# Patient Record
Sex: FEMALE | Race: BLACK OR AFRICAN AMERICAN | NOT HISPANIC OR LATINO | Employment: FULL TIME | URBAN - METROPOLITAN AREA
[De-identification: names, ages, dates, MRNs, and addresses within clinical notes are randomized per-mention and may not be internally consistent; named-entity substitution may affect disease eponyms.]

---

## 2017-06-13 ENCOUNTER — OFFICE VISIT (OUTPATIENT)
Dept: OBSTETRICS AND GYNECOLOGY | Facility: CLINIC | Age: 47
End: 2017-06-13
Payer: COMMERCIAL

## 2017-06-13 VITALS
HEIGHT: 66 IN | DIASTOLIC BLOOD PRESSURE: 72 MMHG | SYSTOLIC BLOOD PRESSURE: 126 MMHG | BODY MASS INDEX: 26.58 KG/M2 | WEIGHT: 165.38 LBS

## 2017-06-13 DIAGNOSIS — Z01.419 VISIT FOR GYNECOLOGIC EXAMINATION: Primary | ICD-10-CM

## 2017-06-13 DIAGNOSIS — Z01.419 GYNECOLOGIC EXAM NORMAL: ICD-10-CM

## 2017-06-13 PROCEDURE — 99386 PREV VISIT NEW AGE 40-64: CPT | Mod: S$GLB,,, | Performed by: OBSTETRICS & GYNECOLOGY

## 2017-06-13 PROCEDURE — 88175 CYTOPATH C/V AUTO FLUID REDO: CPT

## 2017-06-13 PROCEDURE — 99999 PR PBB SHADOW E&M-NEW PATIENT-LVL III: CPT | Mod: PBBFAC,,, | Performed by: OBSTETRICS & GYNECOLOGY

## 2017-06-13 RX ORDER — AMLODIPINE BESYLATE 5 MG/1
5 TABLET ORAL DAILY
COMMUNITY
End: 2019-09-03 | Stop reason: CLARIF

## 2017-06-13 RX ORDER — BUSPIRONE HYDROCHLORIDE 5 MG/1
5 TABLET ORAL 2 TIMES DAILY
COMMUNITY
End: 2019-09-03 | Stop reason: ALTCHOICE

## 2017-06-13 RX ORDER — PANTOPRAZOLE SODIUM 40 MG/1
40 TABLET, DELAYED RELEASE ORAL DAILY
COMMUNITY
End: 2019-09-03 | Stop reason: SDUPTHER

## 2017-06-13 NOTE — PROGRESS NOTES
Chief Complaint   Patient presents with    Well Woman       History and Physical:  Patient's last menstrual period was 2017 (approximate).       Joan Rizvi is a 46 y.o. -American Female who presents today for her routine annual GYN exam. The patient has no Gynecology complaints today. Fibroids, menses lasting 3-4 days each months, endometrial ablation 3-4 years ago. No bowel or bladder complaints. Counseled on benign nature of fibroids, treatment options.       Allergies:   Review of patient's allergies indicates:   Allergen Reactions    Hydrochlorothiazide        Past Medical History:   Diagnosis Date    GERD (gastroesophageal reflux disease)     Hypertension     Mental disorder        Past Surgical History:   Procedure Laterality Date     SECTION      ENDOMETRIAL ABLATION      upper gi         MEDS:   No current outpatient prescriptions on file prior to visit.     No current facility-administered medications on file prior to visit.        OB History      Para Term  AB Living    4 3 2 1 1     SAB TAB Ectopic Multiple Live Births                  Social History     Social History    Marital status:      Spouse name: N/A    Number of children: N/A    Years of education: N/A     Occupational History    Not on file.     Social History Main Topics    Smoking status: Former Smoker    Smokeless tobacco: Not on file    Alcohol use No    Drug use: No    Sexual activity: Yes     Partners: Male     Other Topics Concern    Not on file     Social History Narrative    No narrative on file       Family History   Problem Relation Age of Onset    Heart failure Mother     Diabetes Maternal Aunt     Diabetes Maternal Uncle     Heart disease Maternal Uncle          Past medical and surgical history reviewed.   I have reviewed the patient's medical history in detail and updated the computerized patient record.        Review of System:   General: no chills, fever,  "night sweats, weight gain or weight loss  Psychological: no depression or suicidal ideation  Breasts: no new or changing breast lumps, nipple discharge or masses.  Respiratory: no cough, shortness of breath, or wheezing  Cardiovascular: no chest pain or dyspnea on exertion  Gastrointestinal: no abdominal pain, change in bowel habits, or black or bloody stools  Genito-Urinary: no incontinence, urinary frequency/urgency or vulvar/vaginal symptoms, pelvic pain or abnormal vaginal bleeding.  Musculoskeletal: no gait disturbance or muscular weakness      Physical Exam:   /72   Ht 5' 6" (1.676 m)   Wt 75 kg (165 lb 5.5 oz)   LMP 06/03/2017 (Approximate)   BMI 26.69 kg/m²   Constitutional: She is oriented to person, place, and time. She appears well-developed and well-nourished. No distress.   HENT:   Head: Normocephalic and atraumatic.   Eyes: Conjunctivae and EOM are normal. No scleral icterus.   Neck: Normal range of motion. Neck supple. No tracheal deviation present.   Cardiovascular: Normal rate.    Pulmonary/Chest: Effort normal. No respiratory distress. She exhibits no tenderness.  Breasts: are symmetrical.   Right breast exhibits no inverted nipple, no mass, no nipple discharge, no skin change and no tenderness.   Left breast exhibits no inverted nipple, no mass, no nipple discharge, no skin change and no tenderness.  Abdominal: Soft. She exhibits no distension and no mass. There is no tenderness. There is no rebound and no guarding.   Genitourinary:    External rectal exam shows no thrombosed external hemorrhoids.    Pelvic exam was performed with patient supine.   No labial fusion.   There is no rash, lesion or injury on the right labia.   There is no rash, lesion or injury on the left labia.   No bleeding and no signs of injury around the vaginal introitus, urethra is without lesions and well supported. The cervix is visualized with no discharge, lesions or friability.   No vaginal discharge found.    " No significant Cystocele, Enterocele or rectocele, and uterus well supported.   Bimanual exam:   The urethra is normal to palpation and there are no palpable vaginal wall masses.   Uterus is not deviated, not enlarged, not fixed, normal shape and not tender.   Cervix exhibits no motion tenderness.    Right adnexum displays no mass and no tenderness.   Left adnexum displays no mass and no tenderness.  Musculoskeletal: Normal range of motion.   Lymphadenopathy: No inguinal adenopathy present.   Neurological: She is alert and oriented to person, place, and time. Coordination normal.   Skin: Skin is warm and dry. She is not diaphoretic.   Psychiatric: She has a normal mood and affect.      Assessment:   Normal annual GYN exam  1. Gynecologic exam normal  Liquid-based pap smear, screening   small fibroid uterus, minimal symptoms, counseled.     Plan:   PAP  Mammogram  Motrin 600mg q 8 with cycles , follow up in 2-3 months if not improving.   Follow up in 1 year.

## 2018-05-31 DIAGNOSIS — Z12.31 VISIT FOR SCREENING MAMMOGRAM: Primary | ICD-10-CM

## 2018-06-07 ENCOUNTER — HOSPITAL ENCOUNTER (OUTPATIENT)
Dept: RADIOLOGY | Facility: HOSPITAL | Age: 48
Discharge: HOME | End: 2018-06-07
Attending: NURSE PRACTITIONER
Payer: COMMERCIAL

## 2018-06-07 VITALS — BODY MASS INDEX: 26.52 KG/M2 | HEIGHT: 66 IN | WEIGHT: 165 LBS

## 2018-06-07 DIAGNOSIS — Z12.31 VISIT FOR SCREENING MAMMOGRAM: ICD-10-CM

## 2018-06-07 PROCEDURE — 77067 SCR MAMMO BI INCL CAD: CPT | Mod: 26,,, | Performed by: RADIOLOGY

## 2018-06-07 PROCEDURE — 77063 BREAST TOMOSYNTHESIS BI: CPT | Mod: 26,,, | Performed by: RADIOLOGY

## 2018-06-07 PROCEDURE — 77067 SCR MAMMO BI INCL CAD: CPT | Mod: TC

## 2019-05-22 ENCOUNTER — OFFICE VISIT (OUTPATIENT)
Dept: GASTROENTEROLOGY | Facility: CLINIC | Age: 49
End: 2019-05-22
Payer: COMMERCIAL

## 2019-05-22 ENCOUNTER — HOSPITAL ENCOUNTER (OUTPATIENT)
Dept: RADIOLOGY | Facility: HOSPITAL | Age: 49
Discharge: HOME | End: 2019-05-22
Attending: INTERNAL MEDICINE
Payer: COMMERCIAL

## 2019-05-22 ENCOUNTER — HOSPITAL ENCOUNTER (OUTPATIENT)
Dept: CARDIOLOGY | Facility: CLINIC | Age: 49
Discharge: HOME | End: 2019-05-22
Payer: COMMERCIAL

## 2019-05-22 ENCOUNTER — TELEPHONE (OUTPATIENT)
Dept: GASTROENTEROLOGY | Facility: CLINIC | Age: 49
End: 2019-05-22

## 2019-05-22 VITALS
DIASTOLIC BLOOD PRESSURE: 84 MMHG | HEIGHT: 63 IN | BODY MASS INDEX: 29.92 KG/M2 | SYSTOLIC BLOOD PRESSURE: 147 MMHG | HEART RATE: 81 BPM | WEIGHT: 168.88 LBS

## 2019-05-22 DIAGNOSIS — R07.9 CHEST PAIN, UNSPECIFIED TYPE: ICD-10-CM

## 2019-05-22 DIAGNOSIS — R07.9 CHEST PAIN, UNSPECIFIED TYPE: Primary | ICD-10-CM

## 2019-05-22 PROCEDURE — 99999 PR PBB SHADOW E&M-EST. PATIENT-LVL III: ICD-10-PCS | Mod: PBBFAC,,, | Performed by: INTERNAL MEDICINE

## 2019-05-22 PROCEDURE — 93005 ELECTROCARDIOGRAM TRACING: CPT | Mod: S$GLB,,, | Performed by: INTERNAL MEDICINE

## 2019-05-22 PROCEDURE — 99204 OFFICE O/P NEW MOD 45 MIN: CPT | Mod: S$GLB,,, | Performed by: INTERNAL MEDICINE

## 2019-05-22 PROCEDURE — 93005 EKG 12-LEAD: ICD-10-PCS | Mod: S$GLB,,, | Performed by: INTERNAL MEDICINE

## 2019-05-22 PROCEDURE — 71046 X-RAY EXAM CHEST 2 VIEWS: CPT | Mod: TC

## 2019-05-22 PROCEDURE — 99204 PR OFFICE/OUTPT VISIT, NEW, LEVL IV, 45-59 MIN: ICD-10-PCS | Mod: S$GLB,,, | Performed by: INTERNAL MEDICINE

## 2019-05-22 PROCEDURE — 3008F PR BODY MASS INDEX (BMI) DOCUMENTED: ICD-10-PCS | Mod: CPTII,S$GLB,, | Performed by: INTERNAL MEDICINE

## 2019-05-22 PROCEDURE — 93010 EKG 12-LEAD: ICD-10-PCS | Mod: S$GLB,,, | Performed by: INTERNAL MEDICINE

## 2019-05-22 PROCEDURE — 71046 X-RAY EXAM CHEST 2 VIEWS: CPT | Mod: 26,,, | Performed by: RADIOLOGY

## 2019-05-22 PROCEDURE — 99999 PR PBB SHADOW E&M-EST. PATIENT-LVL III: CPT | Mod: PBBFAC,,, | Performed by: INTERNAL MEDICINE

## 2019-05-22 PROCEDURE — 3008F BODY MASS INDEX DOCD: CPT | Mod: CPTII,S$GLB,, | Performed by: INTERNAL MEDICINE

## 2019-05-22 PROCEDURE — 93010 ELECTROCARDIOGRAM REPORT: CPT | Mod: S$GLB,,, | Performed by: INTERNAL MEDICINE

## 2019-05-22 PROCEDURE — 71046 XR CHEST PA AND LATERAL: ICD-10-PCS | Mod: 26,,, | Performed by: RADIOLOGY

## 2019-05-22 RX ORDER — SUCRALFATE 1 G/10ML
1 SUSPENSION ORAL 4 TIMES DAILY
Qty: 500 ML | Refills: 1 | Status: SHIPPED | OUTPATIENT
Start: 2019-05-22 | End: 2019-05-23 | Stop reason: SDUPTHER

## 2019-05-22 NOTE — PROGRESS NOTES
Reason for visit:  Chest pain and epigastric pain.    HPI:  Ms. Rizvi is a 48-year-old whose been complaining of retrosternal burning sensation with some heaviness for the past 3 days.  This has been a continuous pain and discomfort which has not relented.  She typically has heartburn issues for which she takes pantoprazole daily.  She has also been taking Zantac 150 mg as needed, however over the past 3 days it appears to have worsened.  She feels pain is more consistent with her reflux symptoms.  She does notice some mild increase in her pain with exertion and with pressing on her chest.  If she takes her bra off of she feels a little better as well.  She denies any dysphagia or odynophagia.  No recent changes in medication or any trauma to the chest.  She denies any a recent binge alcohol drinking.  She denies taking any NSAIDs on a regular basis.  She does have some mild epigastric discomfort as well.  Pain does seem to go up into her shoulders and along the right side to her back as well. Does notice some night sweats but that is been going on for about 3 weeks now.  Her menstrual cycle is stool regular.  She denies any changes in her bowel pattern.  No weight loss fevers or chills. She has been using ibuprofen of p.r.n. as well to help with the chest pain.    Past medical, surgical, social and family history reviewed in epic    Medication allergies reviewed in epic.    Review of systems:  Constitutional:  No fevers no chills no unintentional weight loss, appetite is stable, complains of some malaise and fatigue over the past 3 days  Eyes:  No visual changes or red eyes  ENT:  No odynophagia or hoarseness of voice  Cardiovascular:  No angina or palpitation, no exertional dyspnea  Respiratory:  No shortness of breath or wheezing  Genitourinary:  No dysuria frequency or hematuria  Musculoskeletal:  Complaining of some arthralgias her hands, no myalgias  Skin:  No pruritus or eczema  Neurologic:  Occasional  headaches but no seizures  Gastrointestinal:  See HPI    Physical exam:  Vitals see epic, awake alert oriented x3, appears anxious  Neck:  Supple, no carotid bruits heard  Abdomen:  Soft, mild tenderness to deep palpation in the epigastrium, no guarding or rigidity, nondistended, no masses palpable, no hepatosplenomegaly appreciated, bowel sounds are normal with no abdominal bruits heard  Eyes:  Conjunctivae anicteric, not injected  ENT:  Oral mucosa moist  Cardiovascular:  S1, S2 normal , possible flow murmur in the aortic area, no gallops.  Tenderness to palpation along bilateral lower rib cage  Respiratory:  Bilateral air entry equal with no rhonchi or crackles  Skin:  No palmar erythema or spider angiomata  Neurologic:  No asterixis or tremors  Psychiatric:  Anxious  Lower extremities:  No pedal edema    Impression:  Recent onset of chest pain and longstanding history of gastroesophageal reflux.  Less likely this is cardiac.    Recommendation:  1. Proceed with EKG today  2. Check CBC, CMP, lipase, troponin   3. Schedule ultrasound of the abdomen and upper endoscopy  4. Chest x-ray today  5. Limit ibuprofen use, may use tylenol instead.  6. Carafate suspension  four times a day. Continue Pantoprazole daily.

## 2019-05-22 NOTE — TELEPHONE ENCOUNTER
----- Message from Celestino Walker MD sent at 5/22/2019 12:17 PM CDT -----  Chest X ray looks fine.

## 2019-05-23 ENCOUNTER — TELEPHONE (OUTPATIENT)
Dept: GASTROENTEROLOGY | Facility: CLINIC | Age: 49
End: 2019-05-23

## 2019-05-23 ENCOUNTER — HOSPITAL ENCOUNTER (OUTPATIENT)
Dept: RADIOLOGY | Facility: OTHER | Age: 49
Discharge: HOME | End: 2019-05-23
Attending: INTERNAL MEDICINE
Payer: COMMERCIAL

## 2019-05-23 ENCOUNTER — TELEPHONE (OUTPATIENT)
Dept: ENDOSCOPY | Facility: HOSPITAL | Age: 49
End: 2019-05-23

## 2019-05-23 DIAGNOSIS — R10.10 UPPER ABDOMINAL PAIN: Primary | ICD-10-CM

## 2019-05-23 DIAGNOSIS — R10.10 UPPER ABDOMINAL PAIN: ICD-10-CM

## 2019-05-23 PROCEDURE — 74177 CT ABDOMEN PELVIS WITH CONTRAST: ICD-10-PCS | Mod: 26,,, | Performed by: RADIOLOGY

## 2019-05-23 PROCEDURE — 25500020 PHARM REV CODE 255: Performed by: INTERNAL MEDICINE

## 2019-05-23 PROCEDURE — 74177 CT ABD & PELVIS W/CONTRAST: CPT | Mod: TC

## 2019-05-23 PROCEDURE — 74177 CT ABD & PELVIS W/CONTRAST: CPT | Mod: 26,,, | Performed by: RADIOLOGY

## 2019-05-23 RX ORDER — SUCRALFATE 1 G/1
1 TABLET ORAL 4 TIMES DAILY
Qty: 50 TABLET | Refills: 1 | Status: SHIPPED | OUTPATIENT
Start: 2019-05-23 | End: 2019-09-03 | Stop reason: ALTCHOICE

## 2019-05-23 RX ADMIN — IOHEXOL 30 ML: 350 INJECTION, SOLUTION INTRAVENOUS at 11:05

## 2019-05-23 RX ADMIN — IOHEXOL 75 ML: 350 INJECTION, SOLUTION INTRAVENOUS at 12:05

## 2019-05-23 NOTE — TELEPHONE ENCOUNTER
----- Message from Celestino Walker MD sent at 5/23/2019  2:25 PM CDT -----  I had discussed the CT scan result with the patient earlier. After discussing with  (pancreas specialist) we have decided to do the EGD and EUS of the pancreas together at the end of June. If it is from inflammation the pancreas should look much better. Order for EGD/EUS placed.

## 2019-05-23 NOTE — PROGRESS NOTES
I had discussed the CT scan result with the patient earlier. After discussing with  (pancreas specialist) we have decided to do the EGD and EUS of the pancreas together at the end of June. If it is from inflammation the pancreas should look much better. Order for EGD/EUS placed.

## 2019-05-23 NOTE — TELEPHONE ENCOUNTER
Ma called pt no answer message left on pt vm  Ma called pt because Dr kaye wanted pt to schedule egd and eus in June on the 2nd floor

## 2019-05-23 NOTE — TELEPHONE ENCOUNTER
----- Message from Celestino Walker MD sent at 5/23/2019  2:25 PM CDT -----  Could you please schedule EGD with EUS end of June for this patient. Case discussed with .

## 2019-05-23 NOTE — TELEPHONE ENCOUNTER
----- Message from Celestino Walker MD sent at 5/23/2019  7:51 AM CDT -----  Please notify patient, the blood test appears suspicious for pancreatitis. Please schedule CT scan today ASAP.

## 2019-05-24 ENCOUNTER — TELEPHONE (OUTPATIENT)
Dept: GASTROENTEROLOGY | Facility: CLINIC | Age: 49
End: 2019-05-24

## 2019-05-24 ENCOUNTER — PATIENT MESSAGE (OUTPATIENT)
Dept: ENDOSCOPY | Facility: HOSPITAL | Age: 49
End: 2019-05-24

## 2019-05-24 NOTE — TELEPHONE ENCOUNTER
Marti,   Patient is is calling to schedule her EGD w/ EUS on the 2nd floor with [facility] please call pt back  Thanks

## 2019-05-24 NOTE — TELEPHONE ENCOUNTER
----- Message from Morena Radha sent at 5/24/2019  1:59 PM CDT -----  Contact: self [phone]  Needs Advice    Reason for call: Pt called regarding a f/u for her medication and also she is ready to schedule her procedure appt. She also states her symptoms haven't been progressing        Communication Preference: self [phone]    Additional Information:

## 2019-05-24 NOTE — TELEPHONE ENCOUNTER
Spoke with Justyn from pharmacy he will  pt on medication , on how to dissolve medication in water or applesauce

## 2019-05-24 NOTE — TELEPHONE ENCOUNTER
----- Message from Morena Garcia sent at 5/24/2019  2:06 PM CDT -----  Contact: self [phone]  Patient Returning Call from [facility]    Who Left Message for Patient: Pt states Dr. Walker Office  Communication Preference: self [phone]  Additional Information:

## 2019-05-24 NOTE — TELEPHONE ENCOUNTER
----- Message from Katia Young sent at 5/24/2019  2:24 PM CDT -----  Contact: Kevin- [facility]- [phone]  Aaron- pharmacy called to determine if the rx sucralfate (CARAFATE) 100 mg/mL suspension can be changed from the Liquid to tablet form- please contact Justyn at ext [phone]

## 2019-05-31 ENCOUNTER — TELEPHONE (OUTPATIENT)
Dept: ENDOSCOPY | Facility: HOSPITAL | Age: 49
End: 2019-05-31

## 2019-05-31 NOTE — TELEPHONE ENCOUNTER
Spoke with patient EGD/EUS scheduled for 6/24 at [facility]. Reviewed prep instructions. Ms Rizvi verbalized understanding.

## 2019-05-31 NOTE — TELEPHONE ENCOUNTER
----- Message from Kendra Chan sent at 5/31/2019  3:32 PM CDT -----  Contact: pt#[phone]  Needs Advice    Reason for call:Pt is calling to schedule procedure         Communication Preference:call    Additional Information:

## 2019-06-11 ENCOUNTER — TELEPHONE (OUTPATIENT)
Dept: ENDOSCOPY | Facility: HOSPITAL | Age: 49
End: 2019-06-11

## 2019-06-11 NOTE — TELEPHONE ENCOUNTER
Spoke to pt, she is scheduled for an EUS/EGD on 6/24/19 at 11:00 am, medical history/medications reviewed and prep instructions mailed to pt.

## 2019-06-14 ENCOUNTER — PATIENT MESSAGE (OUTPATIENT)
Dept: ADMINISTRATIVE | Facility: OTHER | Age: 49
End: 2019-06-14

## 2019-06-24 ENCOUNTER — ANESTHESIA (OUTPATIENT)
Dept: ENDOSCOPY | Facility: HOSPITAL | Age: 49
End: 2019-06-24
Payer: COMMERCIAL

## 2019-06-24 ENCOUNTER — TELEPHONE (OUTPATIENT)
Dept: ENDOSCOPY | Facility: HOSPITAL | Age: 49
End: 2019-06-24

## 2019-06-24 ENCOUNTER — HOSPITAL ENCOUNTER (OUTPATIENT)
Facility: HOSPITAL | Age: 49
Discharge: HOME | End: 2019-06-24
Attending: INTERNAL MEDICINE | Admitting: INTERNAL MEDICINE
Payer: COMMERCIAL

## 2019-06-24 ENCOUNTER — ANESTHESIA EVENT (OUTPATIENT)
Dept: ENDOSCOPY | Facility: HOSPITAL | Age: 49
End: 2019-06-24
Payer: COMMERCIAL

## 2019-06-24 VITALS
HEART RATE: 75 BPM | HEIGHT: 65 IN | RESPIRATION RATE: 17 BRPM | OXYGEN SATURATION: 98 % | DIASTOLIC BLOOD PRESSURE: 73 MMHG | BODY MASS INDEX: 26.66 KG/M2 | SYSTOLIC BLOOD PRESSURE: 139 MMHG | WEIGHT: 160 LBS | TEMPERATURE: 98 F

## 2019-06-24 DIAGNOSIS — K86.9 PANCREATIC LESION: Primary | ICD-10-CM

## 2019-06-24 LAB
B-HCG UR QL: NEGATIVE
CTP QC/QA: YES

## 2019-06-24 PROCEDURE — D9220A PRA ANESTHESIA: ICD-10-PCS | Mod: ANES,,, | Performed by: ANESTHESIOLOGY

## 2019-06-24 PROCEDURE — D9220A PRA ANESTHESIA: Mod: CRNA,,, | Performed by: NURSE ANESTHETIST, CERTIFIED REGISTERED

## 2019-06-24 PROCEDURE — 43259 PR ENDOSCOPIC ULTRASOUND EXAM: ICD-10-PCS | Mod: ,,, | Performed by: INTERNAL MEDICINE

## 2019-06-24 PROCEDURE — 25000003 PHARM REV CODE 250: Performed by: INTERNAL MEDICINE

## 2019-06-24 PROCEDURE — 43259 EGD US EXAM DUODENUM/JEJUNUM: CPT | Mod: ,,, | Performed by: INTERNAL MEDICINE

## 2019-06-24 PROCEDURE — 81025 URINE PREGNANCY TEST: CPT | Performed by: INTERNAL MEDICINE

## 2019-06-24 PROCEDURE — 63600175 PHARM REV CODE 636 W HCPCS: Performed by: NURSE ANESTHETIST, CERTIFIED REGISTERED

## 2019-06-24 PROCEDURE — D9220A PRA ANESTHESIA: Mod: ANES,,, | Performed by: ANESTHESIOLOGY

## 2019-06-24 PROCEDURE — 43259 EGD US EXAM DUODENUM/JEJUNUM: CPT | Performed by: INTERNAL MEDICINE

## 2019-06-24 PROCEDURE — 37000009 HC ANESTHESIA EA ADD 15 MINS: Performed by: INTERNAL MEDICINE

## 2019-06-24 PROCEDURE — 37000008 HC ANESTHESIA 1ST 15 MINUTES: Performed by: INTERNAL MEDICINE

## 2019-06-24 PROCEDURE — D9220A PRA ANESTHESIA: ICD-10-PCS | Mod: CRNA,,, | Performed by: NURSE ANESTHETIST, CERTIFIED REGISTERED

## 2019-06-24 RX ORDER — SODIUM CHLORIDE 9 MG/ML
INJECTION, SOLUTION INTRAVENOUS CONTINUOUS
Status: DISCONTINUED | OUTPATIENT
Start: 2019-06-24 | End: 2019-06-24 | Stop reason: HOSPADM

## 2019-06-24 RX ORDER — PROPOFOL 10 MG/ML
INJECTION, EMULSION INTRAVENOUS
Status: DISCONTINUED | OUTPATIENT
Start: 2019-06-24 | End: 2019-06-24

## 2019-06-24 RX ORDER — LIDOCAINE HCL/PF 100 MG/5ML
SYRINGE (ML) INTRAVENOUS
Status: DISCONTINUED | OUTPATIENT
Start: 2019-06-24 | End: 2019-06-24

## 2019-06-24 RX ORDER — SODIUM CHLORIDE 0.9 % (FLUSH) 0.9 %
10 SYRINGE (ML) INJECTION
Status: DISCONTINUED | OUTPATIENT
Start: 2019-06-24 | End: 2019-06-24 | Stop reason: HOSPADM

## 2019-06-24 RX ORDER — OXYCODONE HYDROCHLORIDE 5 MG/1
5 TABLET ORAL
Status: DISCONTINUED | OUTPATIENT
Start: 2019-06-24 | End: 2019-06-24 | Stop reason: HOSPADM

## 2019-06-24 RX ORDER — FENTANYL CITRATE 50 UG/ML
25 INJECTION, SOLUTION INTRAMUSCULAR; INTRAVENOUS EVERY 5 MIN PRN
Status: DISCONTINUED | OUTPATIENT
Start: 2019-06-24 | End: 2019-06-24 | Stop reason: HOSPADM

## 2019-06-24 RX ORDER — MIDAZOLAM HYDROCHLORIDE 1 MG/ML
INJECTION, SOLUTION INTRAMUSCULAR; INTRAVENOUS
Status: DISCONTINUED | OUTPATIENT
Start: 2019-06-24 | End: 2019-06-24

## 2019-06-24 RX ORDER — PROPOFOL 10 MG/ML
VIAL (ML) INTRAVENOUS CONTINUOUS PRN
Status: DISCONTINUED | OUTPATIENT
Start: 2019-06-24 | End: 2019-06-24

## 2019-06-24 RX ADMIN — SODIUM CHLORIDE: 0.9 INJECTION, SOLUTION INTRAVENOUS at 10:06

## 2019-06-24 RX ADMIN — PROPOFOL 50 MG: 10 INJECTION, EMULSION INTRAVENOUS at 10:06

## 2019-06-24 RX ADMIN — LIDOCAINE HYDROCHLORIDE 100 MG: 20 INJECTION, SOLUTION INTRAVENOUS at 10:06

## 2019-06-24 RX ADMIN — PROPOFOL 200 MCG/KG/MIN: 10 INJECTION, EMULSION INTRAVENOUS at 10:06

## 2019-06-24 RX ADMIN — MIDAZOLAM HYDROCHLORIDE 2 MG: 1 INJECTION, SOLUTION INTRAMUSCULAR; INTRAVENOUS at 10:06

## 2019-06-24 NOTE — [employer]OVATION PATIENT INSTRUCTIONS
Discharge Summary/Instructions after an Endoscopic Procedure  Patient Name: Joan Rizvi  Patient MRN: [id number]  Patient YOB: 1970 Monday, June 24, 2019  Kristian Wakefield MD  RESTRICTIONS:  During your procedure today, you received medications for sedation.  These   medications may affect your judgment, balance and coordination.  Therefore,   for 24 hours, you have the following restrictions:   - DO NOT drive a car, operate machinery, make legal/financial decisions,   sign important papers or drink alcohol.    ACTIVITY:  Today: no heavy lifting, straining or running due to procedural   sedation/anesthesia.  The following day: return to full activity including work.  DIET:  Eat and drink normally unless instructed otherwise.     TREATMENT FOR COMMON SIDE EFFECTS:  - Mild abdominal pain, nausea, belching, bloating or excessive gas:  rest,   eat lightly and use a heating pad.  - Sore Throat: treat with throat lozenges and/or gargle with warm salt   water.  - Because air was used during the procedure, expelling large amounts of air   from your rectum or belching is normal.  - If a bowel prep was taken, you may not have a bowel movement for 1-3 days.    This is normal.  SYMPTOMS TO WATCH FOR AND REPORT TO YOUR PHYSICIAN:  1. Abdominal pain or bloating, other than gas cramps.  2. Chest pain.  3. Back pain.  4. Signs of infection such as: chills or fever occurring within 24 hours   after the procedure.  5. Rectal bleeding, which would show as bright red, maroon, or black stools.   (A tablespoon of blood from the rectum is not serious, especially if   hemorrhoids are present.)  6. Vomiting.  7. Weakness or dizziness.  GO DIRECTLY TO THE NEAREST EMERGENCY ROOM IF YOU HAVE ANY OF THE FOLLOWING:      Difficulty breathing              Chills and/or fever over 101 F   Persistent vomiting and/or vomiting blood   Severe abdominal pain   Severe chest pain   Black, tarry stools   Bleeding- more than one  tablespoon   Any other symptom or condition that you feel may need urgent attention  Your doctor recommends these additional instructions:  If any biopsies were taken, your doctors clinic will contact you in 1 to 2   weeks with any results.  - Discharge patient to home (ambulatory).   - Resume previous diet; Discharge to home (ambulatory); Resume outpatient   medications  - Return to primary care physician as previously scheduled.   - Will arrange for general surgery referral for consideration of lap shanta -   per hx, pt seems to have had at least 2 episodes of acute pancreatitis, and   EUS shows sludge in GB.  - Will also arrange for a f/u pancreatic protocol CT in 3-4 months to verify   that there are no progressive changes in region of tail of the pancreas.  For questions, problems or results please call your physician - Kristian Wakefield MD at Work:  [phone].  [facility], EMERGENCY ROOM PHONE NUMBER: [phone]  IF A COMPLICATION OR EMERGENCY SITUATION ARISES AND YOU ARE UNABLE TO REACH   YOUR PHYSICIAN - GO DIRECTLY TO THE EMERGENCY ROOM.  Kristian Wakefield MD  6/24/2019 11:13:06 AM  This report has been verified and signed electronically.  [employer]

## 2019-06-24 NOTE — TELEPHONE ENCOUNTER
----- Message from Kristian Wakefield MD sent at 6/24/2019 11:34 AM CDT -----  Marti- Please arrange for pt to see a general surgeon for lap shanta. She has some sludge in the GB, and has had recurrent pancreatitis.

## 2019-06-24 NOTE — ANESTHESIA PREPROCEDURE EVALUATION
2019  Joan Rizvi is a 48 y.o., female   Pre-operative evaluation for Procedure(s) (LRB):  ULTRASOUND, UPPER GI TRACT, ENDOSCOPIC (N/A)  EGD (ESOPHAGOGASTRODUODENOSCOPY) (N/A)    Joan Rizvi is a 48 y.o. female       Active problems:  There is no problem list on file for this patient.  '    Prev airway:       Review of patient's allergies indicates:   Allergen Reactions    Hydrochlorothiazide         No current facility-administered medications on file prior to encounter.      Current Outpatient Medications on File Prior to Encounter   Medication Sig Dispense Refill    amlodipine (NORVASC) 5 MG tablet Take 5 mg by mouth once daily.      busPIRone (BUSPAR) 5 MG Tab Take 5 mg by mouth 2 (two) times daily.      pantoprazole (PROTONIX) 40 MG tablet Take 40 mg by mouth once daily.      sucralfate (CARAFATE) 1 gram tablet Take 1 tablet (1 g total) by mouth 4 (four) times daily. 50 tablet 1       Past Surgical History:   Procedure Laterality Date     SECTION      ENDOMETRIAL ABLATION      upper gi         Social History     Socioeconomic History    Marital status:      Spouse name: Not on file    Number of children: Not on file    Years of education: Not on file    Highest education level: Not on file   Occupational History    Not on file   Social Needs    Financial resource strain: Not on file    Food insecurity:     Worry: Not on file     Inability: Not on file    Transportation needs:     Medical: Not on file     Non-medical: Not on file   Tobacco Use    Smoking status: Former Smoker   Substance and Sexual Activity    Alcohol use: No    Drug use: No    Sexual activity: Yes     Partners: Male   Lifestyle    Physical activity:     Days per week: Not on file     Minutes per session: Not on file    Stress: Not on file   Relationships    Social connections:      Talks on phone: Not on file     Gets together: Not on file     Attends [employer] service: Not on file     Active member of club or organization: Not on file     Attends meetings of clubs or organizations: Not on file     Relationship status: Not on file   Other Topics Concern    Not on file   Social History Narrative    Not on file         Vital Signs Range (Last 24H):  Wt Readings from Last 3 Encounters:   05/22/19 76.6 kg (168 lb 14 oz)   06/07/18 74.8 kg (165 lb)   06/13/17 75 kg (165 lb 5.5 oz)     Temp Readings from Last 3 Encounters:   No data found for Temp     BP Readings from Last 3 Encounters:   05/22/19 (!) 147/84   06/13/17 126/72     Pulse Readings from Last 3 Encounters:   05/22/19 81         CBC:   Lab Results   Component Value Date    WBC 6.99 05/22/2019    HGB 12.9 05/22/2019    HCT 38.6 05/22/2019    MCV 97 05/22/2019     05/22/2019       CMP: CMP  Sodium   Date Value Ref Range Status   05/22/2019 135 (L) 136 - 145 mmol/L Final     Potassium   Date Value Ref Range Status   05/22/2019 3.8 3.5 - 5.1 mmol/L Final     Chloride   Date Value Ref Range Status   05/22/2019 99 95 - 110 mmol/L Final     CO2   Date Value Ref Range Status   05/22/2019 25 23 - 29 mmol/L Final     Glucose   Date Value Ref Range Status   05/22/2019 131 (H) 70 - 110 mg/dL Final     BUN, Bld   Date Value Ref Range Status   05/22/2019 7 6 - 20 mg/dL Final     Creatinine   Date Value Ref Range Status   05/22/2019 0.8 0.5 - 1.4 mg/dL Final     Calcium   Date Value Ref Range Status   05/22/2019 9.5 8.7 - 10.5 mg/dL Final     Total Protein   Date Value Ref Range Status   05/22/2019 7.6 6.0 - 8.4 g/dL Final     Albumin   Date Value Ref Range Status   05/22/2019 4.1 3.5 - 5.2 g/dL Final     Total Bilirubin   Date Value Ref Range Status   05/22/2019 0.7 0.1 - 1.0 mg/dL Final     Comment:     For infants and newborns, interpretation of results should be based  on gestational age, weight and in agreement with  clinical  observations.  Premature Infant recommended reference ranges:  Up to 24 hours.............<8.0 mg/dL  Up to 48 hours............<12.0 mg/dL  3-5 days..................<15.0 mg/dL  6-29 days.................<15.0 mg/dL       Alkaline Phosphatase   Date Value Ref Range Status   2019 125 55 - 135 U/L Final     AST   Date Value Ref Range Status   2019 26 10 - 40 U/L Final     ALT   Date Value Ref Range Status   2019 18 10 - 44 U/L Final     Anion Gap   Date Value Ref Range Status   2019 11 8 - 16 mmol/L Final     eGFR if    Date Value Ref Range Status   2019 >60 >60 mL/min/1.73 m^2 Final     eGFR if non    Date Value Ref Range Status   2019 >60 >60 mL/min/1.73 m^2 Final     Comment:     Calculation used to obtain the estimated glomerular filtration  rate (eGFR) is the CKD-EPI equation.          INR  No results found for: INR, PROTIME        Diagnostic Studies:      EKD Echo:        .    Anesthesia Evaluation         Review of Systems  Cardiovascular:   Hypertension   Pulmonary:  Pulmonary Normal    Hepatic/GI:   GERD    Endocrine:  Endocrine Normal        Physical Exam  General:  Well nourished    Airway/Jaw/Neck:  Airway Findings: Mouth Opening: Normal Tongue: Normal  General Airway Assessment: Adult  Mallampati: I  Jaw/Neck Findings:  Neck ROM: Normal ROM      Dental:  Dental Findings: In tact   Chest/Lungs:  Chest/Lungs Findings: Clear to auscultation     Heart/Vascular:  Heart Findings: Rate: Normal  Rhythm: Regular Rhythm  Sounds: Normal        Mental Status:  Mental Status Findings:  Cooperative         Anesthesia Plan  Type of Anesthesia, risks & benefits discussed:  Anesthesia Type:  general  Patient's Preference:   Intra-op Monitoring Plan:   Intra-op Monitoring Plan Comments:   Post Op Pain Control Plan:   Post Op Pain Control Plan Comments:   Induction:   IV  Beta Blocker:  Patient is not currently on a Beta-Blocker (No  further documentation required).       Informed Consent: Patient understands risks and agrees with Anesthesia plan.  Questions answered. Anesthesia consent signed with patient.  ASA Score: 2     Day of Surgery Review of History & Physical:    H&P update referred to the surgeon.         Ready For Surgery From Anesthesia Perspective.

## 2019-06-24 NOTE — PLAN OF CARE
Patient tolerated procedure well. VSS, complaints of pain well controlled with interventions, tolerating po.  Preparing for discharge.  AVS reviewed with patient and family, who verbalized understanding of S/S of complications, pain control, follow up, advancement of diet and general recovery.  IV to be removed prior to departure.

## 2019-06-24 NOTE — TRANSFER OF CARE
"Anesthesia Transfer of Care Note    Patient: Joan Rizvi    Procedure(s) Performed: Procedure(s) (LRB):  ULTRASOUND, UPPER GI TRACT, ENDOSCOPIC (N/A)  EGD (ESOPHAGOGASTRODUODENOSCOPY) (N/A)    Patient location: [facility]    Anesthesia Type: general    Transport from OR: Transported from OR on 2-3 L/min O2 by NC with adequate spontaneous ventilation    Post pain: adequate analgesia    Post assessment: no apparent anesthetic complications and tolerated procedure well    Post vital signs: stable    Level of consciousness: awake    Nausea/Vomiting: no nausea/vomiting    Complications: none    Transfer of care protocol was followed      Last vitals:   Visit Vitals  /85 (BP Location: Right arm, Patient Position: Lying)   Pulse 96   Temp 36.6 °C (97.9 °F) (Temporal)   Resp 18   Ht 5' 5" (1.651 m)   Wt 72.6 kg (160 lb)   LMP 06/01/2019   SpO2 98%   Breastfeeding? No   BMI 26.63 kg/m²     "

## 2019-06-24 NOTE — H&P
Short Stay Endoscopy History and Physical    PCP - Belgica Chi MD  Referring Physician - Celestino Walker MD  [address] [place] [address]    Procedure - EGD/EUS  ASA - per anesthesia  Mallampati - per anesthesia  History of Anesthesia problems - no  Family history Anesthesia problems -  no   Plan of anesthesia - General    HPI:  This is a 48 y.o. female here for evaluation of: abnl CT - abd pain    Reflux - no  Dysphagia - no  Abdominal pain - POS  Diarrhea - no    ROS:  Constitutional: No fevers, chills, No weight loss  CV: No chest pain  Pulm: No cough, No shortness of breath  GI: see HPI    Medical History:  has a past medical history of GERD (gastroesophageal reflux disease), Hypertension, and Mental disorder.    Surgical History:  has a past surgical history that includes  section; Endometrial ablation; and upper gi.    Family History: family history includes Diabetes in her maternal aunt and maternal uncle; Heart disease in her maternal uncle; Heart failure in her mother..    Social History:  reports that she has quit smoking. She does not have any smokeless tobacco history on file. She reports that she does not drink alcohol or use drugs.    Review of patient's allergies indicates:   Allergen Reactions    Hydrochlorothiazide        Medications:   Medications Prior to Admission   Medication Sig Dispense Refill Last Dose    amlodipine (NORVASC) 5 MG tablet Take 5 mg by mouth once daily.   2019 at Unknown time    busPIRone (BUSPAR) 5 MG Tab Take 5 mg by mouth 2 (two) times daily.   2019 at Unknown time    pantoprazole (PROTONIX) 40 MG tablet Take 40 mg by mouth once daily.   2019 at Unknown time    sucralfate (CARAFATE) 1 gram tablet Take 1 tablet (1 g total) by mouth 4 (four) times daily. 50 tablet 1 Past Month at Unknown time       Physical Exam:    Vital Signs: There were no vitals filed for this visit.    General Appearance: Well appearing in no acute  distress  Eyes:    No scleral icterus  ENT: Neck supple  Lungs: CTA anteriorly  Heart:  Regular  Abdomen: Soft, non tender, non distended with normal bowel sounds.    Labs:  Lab Results   Component Value Date    WBC 6.99 05/22/2019    HGB 12.9 05/22/2019    HCT 38.6 05/22/2019     05/22/2019    ALT 18 05/22/2019    AST 26 05/22/2019     (L) 05/22/2019    K 3.8 05/22/2019    CL 99 05/22/2019    CREATININE 0.8 05/22/2019    BUN 7 05/22/2019    CO2 25 05/22/2019       I have explained the risks and benefits of this endoscopic procedure to the patient including but not limited to bleeding, inflammation, infection, perforation, and death.      Kristian Wakefield MD

## 2019-06-25 ENCOUNTER — TELEPHONE (OUTPATIENT)
Dept: ENDOSCOPY | Facility: HOSPITAL | Age: 49
End: 2019-06-25

## 2019-06-25 DIAGNOSIS — R93.89 ABNORMAL FINDING ON IMAGING: Primary | ICD-10-CM

## 2019-06-25 NOTE — ANESTHESIA POSTPROCEDURE EVALUATION
Anesthesia Post Evaluation    Patient: Joan Rizvi    Procedure(s) Performed: Procedure(s) (LRB):  ULTRASOUND, UPPER GI TRACT, ENDOSCOPIC (N/A)  EGD (ESOPHAGOGASTRODUODENOSCOPY) (N/A)    Final Anesthesia Type: general  Patient location during evaluation: PACU  Patient participation: Yes- Able to Participate  Level of consciousness: awake and alert  Post-procedure vital signs: reviewed and stable  Pain management: adequate  Airway patency: patent  PONV status at discharge: No PONV  Anesthetic complications: no      Cardiovascular status: blood pressure returned to baseline  Respiratory status: unassisted  Hydration status: euvolemic  Follow-up not needed.          Vitals Value Taken Time   /73 6/24/2019 11:21 AM   Temp 36.6 °C (97.9 °F) 6/24/2019 10:40 AM   Pulse 78 6/24/2019 11:23 AM   Resp 20 6/24/2019 11:23 AM   SpO2 99 % 6/24/2019 11:23 AM   Vitals shown include unvalidated device data.      No case tracking events are documented in the log.      Pain/Larisa Score: Larisa Score: 10 (6/24/2019 11:30 AM)

## 2019-06-26 ENCOUNTER — TELEPHONE (OUTPATIENT)
Dept: ENDOSCOPY | Facility: HOSPITAL | Age: 49
End: 2019-06-26

## 2019-06-26 NOTE — TELEPHONE ENCOUNTER
----- Message from Katia Young sent at 6/26/2019  8:16 AM CDT -----  Contact: Self- [phone]  Twyla- pt returning missed call to schedule surgery consult- please contact pt at [phone]

## 2019-07-08 ENCOUNTER — OFFICE VISIT (OUTPATIENT)
Dept: SURGERY | Facility: CLINIC | Age: 49
End: 2019-07-08
Payer: COMMERCIAL

## 2019-07-08 VITALS
WEIGHT: 170 LBS | DIASTOLIC BLOOD PRESSURE: 74 MMHG | HEART RATE: 85 BPM | BODY MASS INDEX: 28.32 KG/M2 | HEIGHT: 65 IN | TEMPERATURE: 98 F | SYSTOLIC BLOOD PRESSURE: 127 MMHG

## 2019-07-08 DIAGNOSIS — K85.10 GALLSTONE PANCREATITIS: Primary | ICD-10-CM

## 2019-07-08 DIAGNOSIS — K80.20 CALCULUS OF GALLBLADDER WITHOUT CHOLECYSTITIS WITHOUT OBSTRUCTION: Primary | ICD-10-CM

## 2019-07-08 PROCEDURE — 99243 OFF/OP CNSLTJ NEW/EST LOW 30: CPT | Mod: S$GLB,,, | Performed by: SURGERY

## 2019-07-08 PROCEDURE — 99999 PR PBB SHADOW E&M-EST. PATIENT-LVL III: CPT | Mod: PBBFAC,,, | Performed by: SURGERY

## 2019-07-08 PROCEDURE — 99999 PR PBB SHADOW E&M-EST. PATIENT-LVL III: ICD-10-PCS | Mod: PBBFAC,,, | Performed by: SURGERY

## 2019-07-08 PROCEDURE — 99243 PR OFFICE CONSULTATION,LEVEL III: ICD-10-PCS | Mod: S$GLB,,, | Performed by: SURGERY

## 2019-07-08 RX ORDER — SODIUM CHLORIDE 9 MG/ML
INJECTION, SOLUTION INTRAVENOUS CONTINUOUS
Status: CANCELLED | OUTPATIENT
Start: 2019-07-08

## 2019-07-08 NOTE — LETTER
July 8, 2019      Kristian Wakefield MD  [address] [facility] [address]           [facility] - General Surgery  [address]  [facility] [address]  Phone: [phone]          Patient: Joan Rizvi   MR Number: [id number]   YOB: 1970   Date of Visit: 7/8/2019       Dear Dr. Kristian Wakefield:    Thank you for referring Joan Rizvi to me for evaluation. Attached you will find relevant portions of my assessment and plan of care.    If you have questions, please do not hesitate to call me. I look forward to following Joan Rizvi along with you.    Sincerely,    Christine Gottlieb PA-C    Enclosure  CC:  No Recipients    If you would like to receive this communication electronically, please contact [email or web address] or [phone] to request more information on [email or web address] Link access.    For providers and/or their staff who would like to refer a patient to [facility], please contact us through our one-stop-shop provider referral line, [facility], at [phone].    If you feel you have received this communication in error or would no longer like to receive these types of communications, please e-mail [email or web address]

## 2019-07-08 NOTE — H&P (VIEW-ONLY)
History & Physical    SUBJECTIVE:     History of Present Illness:  Patient is a 48 y.o. female presents following a recent episode of gallstone pancreatitis. Onset of symptoms was gradual starting a few months ago with stable course since that time. She underwent EUS which showed reactive changes from pancreatitis and gallbladder sludge. Patient denies nausea, vomiting, constipation or diarrhea. Symptoms are aggravated by nothing. Symptoms improve with nothing. She is ready to have her gallbladder out.     Chief Complaint   Patient presents with    Consult       Review of patient's allergies indicates:   Allergen Reactions    Hydrochlorothiazide        Current Outpatient Medications   Medication Sig Dispense Refill    amlodipine (NORVASC) 5 MG tablet Take 5 mg by mouth once daily.      busPIRone (BUSPAR) 5 MG Tab Take 5 mg by mouth 2 (two) times daily.      pantoprazole (PROTONIX) 40 MG tablet Take 40 mg by mouth once daily.      sucralfate (CARAFATE) 1 gram tablet Take 1 tablet (1 g total) by mouth 4 (four) times daily. 50 tablet 1     No current facility-administered medications for this visit.        Past Medical History:   Diagnosis Date    GERD (gastroesophageal reflux disease)     Hypertension     Mental disorder      Past Surgical History:   Procedure Laterality Date     SECTION      EGD (ESOPHAGOGASTRODUODENOSCOPY) N/A 2019    Performed by [facility] at [facility] (2ND FLR)    ENDOMETRIAL ABLATION      ULTRASOUND, UPPER GI TRACT, ENDOSCOPIC N/A 2019    Performed by [facility] MD at [facility] ENDO (2ND FLR)    upper gi       Family History   Problem Relation Age of Onset    Heart failure Mother     Diabetes Maternal Aunt     Diabetes Maternal Uncle     Heart disease Maternal Uncle     Colon cancer Neg Hx     Esophageal cancer Neg Hx      Social History     Tobacco Use    Smoking status: Former Smoker   Substance Use Topics    Alcohol use: No    Drug use: No     "    Review of Systems:  Review of Systems   Constitutional: Negative for chills and fever.   HENT: Negative for congestion and rhinorrhea.    Eyes: Negative for photophobia and visual disturbance.   Respiratory: Negative for cough and shortness of breath.    Cardiovascular: Negative for chest pain and palpitations.   Gastrointestinal: Negative for abdominal pain, constipation, diarrhea, nausea and vomiting.   Endocrine: Negative for cold intolerance and heat intolerance.   Musculoskeletal: Negative for arthralgias and myalgias.   Skin: Negative for rash and wound.   Allergic/Immunologic: Negative for immunocompromised state.   Neurological: Negative for tremors and weakness.   Hematological: Negative for adenopathy.   Psychiatric/Behavioral: Negative for agitation.       OBJECTIVE:     Vital Signs (Most Recent)  Temp: 98.3 °F (36.8 °C) (07/08/19 1023)  Pulse: 85 (07/08/19 1023)  BP: 127/74 (07/08/19 1023)  5' 5" (1.651 m)  77.1 kg (169 lb 15.6 oz)     Physical Exam:  Physical Exam   Constitutional: She is oriented to person, place, and time. She appears well-developed and well-nourished. No distress.   HENT:   Head: Normocephalic and atraumatic.   Eyes: EOM are normal. No scleral icterus.   Neck: Normal range of motion. Neck supple.   Cardiovascular: Normal rate and regular rhythm.   Pulmonary/Chest: Effort normal. No respiratory distress.   Abdominal: Soft. She exhibits no distension. There is no tenderness. There is no guarding.   Musculoskeletal: Normal range of motion. She exhibits no edema or tenderness.   Neurological: She is alert and oriented to person, place, and time.   Skin: Skin is warm and dry.   Psychiatric: She has a normal mood and affect.     Diagnostic Results:  EUS  Impression:           - Normal esophagus.                        - Normal stomach.                        - Normal examined duodenum.                        - Hyperechoic material consistent with sludge was                        "  visualized endosonographically in the gallbladder.                        - Pancreatic parenchymal abnormalities consisting                         of hyperechoic foci, hypoechoic foci and lobularity                         were noted in the pancreatic tail. Given prior                         acute episode of pain with elevated LFTs, suspect                         pt suffered an episode of acute pancreatitis in                         May, and these EUS findings may represent sequalae                         of acute pancreatitis.                        - No specimens collected.    ASSESSMENT/PLAN:   49 yo female w recurrent episodes of gallstone pancreatitis  -plan for cholecystectomy  -Risks and benefits as well as post-operative recovery expectations and restrictions discussed in detail in clinic. Informed consent obtained.

## 2019-07-08 NOTE — PROGRESS NOTES
History & Physical    SUBJECTIVE:     History of Present Illness:  Patient is a 48 y.o. female presents following a recent episode of gallstone pancreatitis. Onset of symptoms was gradual starting a few months ago with stable course since that time. She underwent EUS which showed reactive changes from pancreatitis and gallbladder sludge. Patient denies nausea, vomiting, constipation or diarrhea. Symptoms are aggravated by nothing. Symptoms improve with nothing. She is ready to have her gallbladder out.     Chief Complaint   Patient presents with    Consult       Review of patient's allergies indicates:   Allergen Reactions    Hydrochlorothiazide        Current Outpatient Medications   Medication Sig Dispense Refill    amlodipine (NORVASC) 5 MG tablet Take 5 mg by mouth once daily.      busPIRone (BUSPAR) 5 MG Tab Take 5 mg by mouth 2 (two) times daily.      pantoprazole (PROTONIX) 40 MG tablet Take 40 mg by mouth once daily.      sucralfate (CARAFATE) 1 gram tablet Take 1 tablet (1 g total) by mouth 4 (four) times daily. 50 tablet 1     No current facility-administered medications for this visit.        Past Medical History:   Diagnosis Date    GERD (gastroesophageal reflux disease)     Hypertension     Mental disorder      Past Surgical History:   Procedure Laterality Date     SECTION      EGD (ESOPHAGOGASTRODUODENOSCOPY) N/A 2019    Performed by Kristian [facility]field MD at [facility] ENDO (2ND FLR)    ENDOMETRIAL ABLATION      ULTRASOUND, UPPER GI TRACT, ENDOSCOPIC N/A 2019    Performed by Kristian Wakefield MD at [facility] ENDO (2ND FLR)    upper gi       Family History   Problem Relation Age of Onset    Heart failure Mother     Diabetes Maternal Aunt     Diabetes Maternal Uncle     Heart disease Maternal Uncle     Colon cancer Neg Hx     Esophageal cancer Neg Hx      Social History     Tobacco Use    Smoking status: Former Smoker   Substance Use Topics    Alcohol use: No    Drug use: No     "    Review of Systems:  Review of Systems   Constitutional: Negative for chills and fever.   HENT: Negative for congestion and rhinorrhea.    Eyes: Negative for photophobia and visual disturbance.   Respiratory: Negative for cough and shortness of breath.    Cardiovascular: Negative for chest pain and palpitations.   Gastrointestinal: Negative for abdominal pain, constipation, diarrhea, nausea and vomiting.   Endocrine: Negative for cold intolerance and heat intolerance.   Musculoskeletal: Negative for arthralgias and myalgias.   Skin: Negative for rash and wound.   Allergic/Immunologic: Negative for immunocompromised state.   Neurological: Negative for tremors and weakness.   Hematological: Negative for adenopathy.   Psychiatric/Behavioral: Negative for agitation.       OBJECTIVE:     Vital Signs (Most Recent)  Temp: 98.3 °F (36.8 °C) (07/08/19 1023)  Pulse: 85 (07/08/19 1023)  BP: 127/74 (07/08/19 1023)  5' 5" (1.651 m)  77.1 kg (169 lb 15.6 oz)     Physical Exam:  Physical Exam   Constitutional: She is oriented to person, place, and time. She appears well-developed and well-nourished. No distress.   HENT:   Head: Normocephalic and atraumatic.   Eyes: EOM are normal. No scleral icterus.   Neck: Normal range of motion. Neck supple.   Cardiovascular: Normal rate and regular rhythm.   Pulmonary/Chest: Effort normal. No respiratory distress.   Abdominal: Soft. She exhibits no distension. There is no tenderness. There is no guarding.   Musculoskeletal: Normal range of motion. She exhibits no edema or tenderness.   Neurological: She is alert and oriented to person, place, and time.   Skin: Skin is warm and dry.   Psychiatric: She has a normal mood and affect.     Diagnostic Results:  EUS  Impression:           - Normal esophagus.                        - Normal stomach.                        - Normal examined duodenum.                        - Hyperechoic material consistent with sludge was                        "  visualized endosonographically in the gallbladder.                        - Pancreatic parenchymal abnormalities consisting                         of hyperechoic foci, hypoechoic foci and lobularity                         were noted in the pancreatic tail. Given prior                         acute episode of pain with elevated LFTs, suspect                         pt suffered an episode of acute pancreatitis in                         May, and these EUS findings may represent sequalae                         of acute pancreatitis.                        - No specimens collected.    ASSESSMENT/PLAN:   49 yo female w recurrent episodes of gallstone pancreatitis  -plan for cholecystectomy  -Risks and benefits as well as post-operative recovery expectations and restrictions discussed in detail in clinic. Informed consent obtained.

## 2019-07-23 ENCOUNTER — ANESTHESIA EVENT (OUTPATIENT)
Dept: SURGERY | Facility: HOSPITAL | Age: 49
End: 2019-07-23
Payer: COMMERCIAL

## 2019-07-23 ENCOUNTER — TELEPHONE (OUTPATIENT)
Dept: SURGERY | Facility: CLINIC | Age: 49
End: 2019-07-23

## 2019-07-23 NOTE — ANESTHESIA PREPROCEDURE EVALUATION
[facility]  Anesthesia Pre-Operative Evaluation         Patient Name: Joan Rizvi  YOB: 1970  MRN: [id number]    SUBJECTIVE:     Pre-operative evaluation for Procedure(s) (LRB):  CHOLECYSTECTOMY, LAPAROSCOPIC (N/A)     2019    Joan Rizvi is a 48 y.o. female w/ a significant PMHx of GERD and HTN. She had a recent episode of gallstone pancreatitis.    Patient now presents for the above procedure(s).      LDA: None documented.     Prev airway: None documented.    Drips: None documented.      Patient Active Problem List   Diagnosis    Pancreatic lesion       Review of patient's allergies indicates:   Allergen Reactions    Hydrochlorothiazide Other (See Comments)     CAUSING PANCREATITIS        Current Inpatient Medications:      No current facility-administered medications on file prior to encounter.      Current Outpatient Medications on File Prior to Encounter   Medication Sig Dispense Refill    amlodipine (NORVASC) 5 MG tablet Take 5 mg by mouth once daily.      busPIRone (BUSPAR) 5 MG Tab Take 5 mg by mouth 2 (two) times daily.      pantoprazole (PROTONIX) 40 MG tablet Take 40 mg by mouth once daily.      sucralfate (CARAFATE) 1 gram tablet Take 1 tablet (1 g total) by mouth 4 (four) times daily. 50 tablet 1       Past Surgical History:   Procedure Laterality Date     SECTION      EGD (ESOPHAGOGASTRODUODENOSCOPY) N/A 2019    Performed by [facility] MD at [facility] ENDO (2ND FLR)    ENDOMETRIAL ABLATION      ULTRASOUND, UPPER GI TRACT, ENDOSCOPIC N/A 2019    Performed by [facility]ian [facility]field MD at [facility] ENDO (2ND FLR)    upper gi         Social History     Socioeconomic History    Marital status:      Spouse name: Not on file    Number of children: Not on file    Years of education: Not on file    Highest education level: Not on file   Occupational History    Not on file   Social Needs    Financial resource strain: Not on file     Food insecurity:     Worry: Not on file     Inability: Not on file    Transportation needs:     Medical: Not on file     Non-medical: Not on file   Tobacco Use    Smoking status: Former Smoker   Substance and Sexual Activity    Alcohol use: No    Drug use: No    Sexual activity: Yes     Partners: Male   Lifestyle    Physical activity:     Days per week: Not on file     Minutes per session: Not on file    Stress: Not on file   Relationships    Social connections:     Talks on phone: Not on file     Gets together: Not on file     Attends Orthodoxy service: Not on file     Active member of club or organization: Not on file     Attends meetings of clubs or organizations: Not on file     Relationship status: Not on file   Other Topics Concern    Not on file   Social History Narrative    Not on file       OBJECTIVE:     Vital Signs Range (Last 24H):         Significant Labs:  Lab Results   Component Value Date    WBC 6.99 05/22/2019    HGB 12.9 05/22/2019    HCT 38.6 05/22/2019     05/22/2019    ALT 18 05/22/2019    AST 26 05/22/2019     (L) 05/22/2019    K 3.8 05/22/2019    CL 99 05/22/2019    CREATININE 0.8 05/22/2019    BUN 7 05/22/2019    CO2 25 05/22/2019       Diagnostic Studies: No relevant studies.    EKG:   Vent. Rate : 071 BPM     Atrial Rate : 071 BPM     P-R Int : 162 ms          QRS Dur : 078 ms      QT Int : 356 ms       P-R-T Axes : 056 035 043 degrees     QTc Int : 386 ms    Normal sinus rhythm  Normal ECG  No previous ECGs available  Confirmed by KACEY HUANG MD ([id number]) on 5/22/2019 4:07:01 PM    2D ECHO:  No results found for this or any previous visit.      ASSESSMENT/PLAN:         Anesthesia Evaluation    I have reviewed the Patient Summary Reports.    I have reviewed the Nursing Notes.   I have reviewed the Medications.     Review of Systems  Anesthesia Hx:  No problems with previous Anesthesia  History of prior surgery of interest to airway management or planning: Previous  anesthesia: MAC  EGD 6/2019 with MAC.   Denies Personal Hx of Anesthesia complications.   Social:  Former Smoker    EENT/Dental:EENT/Dental Normal   Cardiovascular:   Hypertension    Pulmonary:  Pulmonary Normal    Hepatic/GI:   GERD    Neurological:  Neurology Normal    Endocrine:  Endocrine Normal    Psych:   Psychiatric History          Physical Exam  General:  Well nourished    Airway/Jaw/Neck:  Airway Findings: Mouth Opening: Normal Tongue: Normal  General Airway Assessment: Adult  Mallampati: I  Jaw/Neck Findings:  Neck ROM: Normal ROM      Dental:  Dental Findings: In tact   Chest/Lungs:  Chest/Lungs Findings: Clear to auscultation     Heart/Vascular:  Heart Findings: Rate: Normal  Rhythm: Regular Rhythm  Sounds: Normal        Mental Status:  Mental Status Findings:  Cooperative         Anesthesia Plan  Type of Anesthesia, risks & benefits discussed:  Anesthesia Type:  general  Patient's Preference:   Intra-op Monitoring Plan: standard ASA monitors  Intra-op Monitoring Plan Comments:   Post Op Pain Control Plan: multimodal analgesia and IV/PO Opioids PRN  Post Op Pain Control Plan Comments:   Induction:   IV  Beta Blocker:  Patient is not currently on a Beta-Blocker (No further documentation required).       Informed Consent: Patient understands risks and agrees with Anesthesia plan.  Questions answered. Anesthesia consent signed with patient.  ASA Score: 2     Day of Surgery Review of History & Physical:    H&P update referred to the surgeon.         Ready For Surgery From Anesthesia Perspective.

## 2019-07-24 ENCOUNTER — ANESTHESIA (OUTPATIENT)
Dept: SURGERY | Facility: HOSPITAL | Age: 49
End: 2019-07-24
Payer: COMMERCIAL

## 2019-07-24 ENCOUNTER — HOSPITAL ENCOUNTER (OUTPATIENT)
Facility: HOSPITAL | Age: 49
Discharge: HOME | End: 2019-07-24
Attending: SURGERY | Admitting: SURGERY
Payer: COMMERCIAL

## 2019-07-24 VITALS
RESPIRATION RATE: 16 BRPM | HEIGHT: 64 IN | SYSTOLIC BLOOD PRESSURE: 132 MMHG | HEART RATE: 64 BPM | DIASTOLIC BLOOD PRESSURE: 64 MMHG | OXYGEN SATURATION: 97 % | BODY MASS INDEX: 29.02 KG/M2 | WEIGHT: 170 LBS | TEMPERATURE: 98 F

## 2019-07-24 DIAGNOSIS — K85.10 GALLSTONE PANCREATITIS: Primary | ICD-10-CM

## 2019-07-24 LAB
B-HCG UR QL: NEGATIVE
CTP QC/QA: YES

## 2019-07-24 PROCEDURE — 81025 URINE PREGNANCY TEST: CPT | Performed by: ANESTHESIOLOGY

## 2019-07-24 PROCEDURE — 47562 PR LAP,CHOLECYSTECTOMY: ICD-10-PCS | Mod: ,,, | Performed by: SURGERY

## 2019-07-24 PROCEDURE — 71000033 HC RECOVERY, INTIAL HOUR: Performed by: SURGERY

## 2019-07-24 PROCEDURE — 63600175 PHARM REV CODE 636 W HCPCS: Performed by: STUDENT IN AN ORGANIZED HEALTH CARE EDUCATION/TRAINING PROGRAM

## 2019-07-24 PROCEDURE — 25000003 PHARM REV CODE 250: Performed by: STUDENT IN AN ORGANIZED HEALTH CARE EDUCATION/TRAINING PROGRAM

## 2019-07-24 PROCEDURE — D9220A PRA ANESTHESIA: Mod: ,,, | Performed by: ANESTHESIOLOGY

## 2019-07-24 PROCEDURE — 63600175 PHARM REV CODE 636 W HCPCS: Performed by: PHYSICIAN ASSISTANT

## 2019-07-24 PROCEDURE — 25000003 PHARM REV CODE 250: Performed by: PHYSICIAN ASSISTANT

## 2019-07-24 PROCEDURE — 71000039 HC RECOVERY, EACH ADD'L HOUR: Performed by: SURGERY

## 2019-07-24 PROCEDURE — 25000003 PHARM REV CODE 250

## 2019-07-24 PROCEDURE — 27000221 HC OXYGEN, UP TO 24 HOURS

## 2019-07-24 PROCEDURE — 27201423 OPTIME MED/SURG SUP & DEVICES STERILE SUPPLY: Performed by: SURGERY

## 2019-07-24 PROCEDURE — 25000003 PHARM REV CODE 250: Performed by: SURGERY

## 2019-07-24 PROCEDURE — 71000015 HC POSTOP RECOV 1ST HR: Performed by: SURGERY

## 2019-07-24 PROCEDURE — S0020 INJECTION, BUPIVICAINE HYDRO: HCPCS | Performed by: SURGERY

## 2019-07-24 PROCEDURE — 88304 TISSUE EXAM BY PATHOLOGIST: CPT | Performed by: PATHOLOGY

## 2019-07-24 PROCEDURE — 88304 TISSUE SPECIMEN TO PATHOLOGY - SURGERY: ICD-10-PCS | Mod: 26,,, | Performed by: PATHOLOGY

## 2019-07-24 PROCEDURE — 37000009 HC ANESTHESIA EA ADD 15 MINS: Performed by: SURGERY

## 2019-07-24 PROCEDURE — D9220A PRA ANESTHESIA: ICD-10-PCS | Mod: ,,, | Performed by: ANESTHESIOLOGY

## 2019-07-24 PROCEDURE — 63600175 PHARM REV CODE 636 W HCPCS

## 2019-07-24 PROCEDURE — 36000709 HC OR TIME LEV III EA ADD 15 MIN: Performed by: SURGERY

## 2019-07-24 PROCEDURE — 37000008 HC ANESTHESIA 1ST 15 MINUTES: Performed by: SURGERY

## 2019-07-24 PROCEDURE — 36000708 HC OR TIME LEV III 1ST 15 MIN: Performed by: SURGERY

## 2019-07-24 PROCEDURE — 94761 N-INVAS EAR/PLS OXIMETRY MLT: CPT

## 2019-07-24 PROCEDURE — 47562 LAPAROSCOPIC CHOLECYSTECTOMY: CPT | Mod: ,,, | Performed by: SURGERY

## 2019-07-24 RX ORDER — PHENYLEPHRINE HYDROCHLORIDE 10 MG/ML
INJECTION INTRAVENOUS
Status: DISCONTINUED | OUTPATIENT
Start: 2019-07-24 | End: 2019-07-24

## 2019-07-24 RX ORDER — SODIUM CHLORIDE 0.9 % (FLUSH) 0.9 %
10 SYRINGE (ML) INJECTION
Status: DISCONTINUED | OUTPATIENT
Start: 2019-07-24 | End: 2019-07-24 | Stop reason: HOSPADM

## 2019-07-24 RX ORDER — MIDAZOLAM HYDROCHLORIDE 1 MG/ML
INJECTION, SOLUTION INTRAMUSCULAR; INTRAVENOUS
Status: DISCONTINUED | OUTPATIENT
Start: 2019-07-24 | End: 2019-07-24

## 2019-07-24 RX ORDER — OXYCODONE AND ACETAMINOPHEN 5; 325 MG/1; MG/1
1 TABLET ORAL EVERY 4 HOURS PRN
Qty: 31 TABLET | Refills: 0 | Status: SHIPPED | OUTPATIENT
Start: 2019-07-24 | End: 2019-07-24

## 2019-07-24 RX ORDER — CEFAZOLIN SODIUM 1 G/3ML
2 INJECTION, POWDER, FOR SOLUTION INTRAMUSCULAR; INTRAVENOUS
Status: COMPLETED | OUTPATIENT
Start: 2019-07-24 | End: 2019-07-24

## 2019-07-24 RX ORDER — FENTANYL CITRATE 50 UG/ML
INJECTION, SOLUTION INTRAMUSCULAR; INTRAVENOUS
Status: COMPLETED
Start: 2019-07-24 | End: 2019-07-24

## 2019-07-24 RX ORDER — OXYCODONE AND ACETAMINOPHEN 5; 325 MG/1; MG/1
1 TABLET ORAL EVERY 4 HOURS PRN
Status: DISCONTINUED | OUTPATIENT
Start: 2019-07-24 | End: 2019-07-24 | Stop reason: HOSPADM

## 2019-07-24 RX ORDER — LIDOCAINE HCL/PF 100 MG/5ML
SYRINGE (ML) INTRAVENOUS
Status: DISCONTINUED | OUTPATIENT
Start: 2019-07-24 | End: 2019-07-24

## 2019-07-24 RX ORDER — GLYCOPYRROLATE 0.2 MG/ML
INJECTION INTRAMUSCULAR; INTRAVENOUS
Status: DISCONTINUED | OUTPATIENT
Start: 2019-07-24 | End: 2019-07-24

## 2019-07-24 RX ORDER — FENTANYL CITRATE 50 UG/ML
25 INJECTION, SOLUTION INTRAMUSCULAR; INTRAVENOUS EVERY 5 MIN PRN
Status: COMPLETED | OUTPATIENT
Start: 2019-07-24 | End: 2019-07-24

## 2019-07-24 RX ORDER — SODIUM CHLORIDE 9 MG/ML
INJECTION, SOLUTION INTRAVENOUS CONTINUOUS
Status: DISCONTINUED | OUTPATIENT
Start: 2019-07-24 | End: 2019-07-24 | Stop reason: HOSPADM

## 2019-07-24 RX ORDER — PROPOFOL 10 MG/ML
VIAL (ML) INTRAVENOUS
Status: DISCONTINUED | OUTPATIENT
Start: 2019-07-24 | End: 2019-07-24

## 2019-07-24 RX ORDER — FENTANYL CITRATE 50 UG/ML
INJECTION, SOLUTION INTRAMUSCULAR; INTRAVENOUS
Status: DISCONTINUED | OUTPATIENT
Start: 2019-07-24 | End: 2019-07-24

## 2019-07-24 RX ORDER — NEOSTIGMINE METHYLSULFATE 1 MG/ML
INJECTION, SOLUTION INTRAVENOUS
Status: DISCONTINUED | OUTPATIENT
Start: 2019-07-24 | End: 2019-07-24

## 2019-07-24 RX ORDER — ROCURONIUM BROMIDE 10 MG/ML
INJECTION, SOLUTION INTRAVENOUS
Status: DISCONTINUED | OUTPATIENT
Start: 2019-07-24 | End: 2019-07-24

## 2019-07-24 RX ORDER — OXYCODONE AND ACETAMINOPHEN 5; 325 MG/1; MG/1
TABLET ORAL
Status: COMPLETED
Start: 2019-07-24 | End: 2019-07-24

## 2019-07-24 RX ORDER — BUPIVACAINE HYDROCHLORIDE 5 MG/ML
INJECTION, SOLUTION EPIDURAL; INTRACAUDAL
Status: DISCONTINUED | OUTPATIENT
Start: 2019-07-24 | End: 2019-07-24 | Stop reason: HOSPADM

## 2019-07-24 RX ORDER — OXYCODONE AND ACETAMINOPHEN 5; 325 MG/1; MG/1
1 TABLET ORAL EVERY 4 HOURS PRN
Qty: 31 TABLET | Refills: 0 | Status: SHIPPED | OUTPATIENT
Start: 2019-07-24 | End: 2019-09-03 | Stop reason: ALTCHOICE

## 2019-07-24 RX ORDER — ACETAMINOPHEN 10 MG/ML
INJECTION, SOLUTION INTRAVENOUS
Status: DISCONTINUED | OUTPATIENT
Start: 2019-07-24 | End: 2019-07-24

## 2019-07-24 RX ORDER — ONDANSETRON 2 MG/ML
INJECTION INTRAMUSCULAR; INTRAVENOUS
Status: DISCONTINUED | OUTPATIENT
Start: 2019-07-24 | End: 2019-07-24

## 2019-07-24 RX ADMIN — NEOSTIGMINE METHYLSULFATE 5 MG: 1 INJECTION INTRAVENOUS at 09:07

## 2019-07-24 RX ADMIN — ACETAMINOPHEN 1000 MG: 10 INJECTION, SOLUTION INTRAVENOUS at 08:07

## 2019-07-24 RX ADMIN — MIDAZOLAM HYDROCHLORIDE 2 MG: 1 INJECTION, SOLUTION INTRAMUSCULAR; INTRAVENOUS at 08:07

## 2019-07-24 RX ADMIN — FENTANYL CITRATE 25 MCG: 50 INJECTION INTRAMUSCULAR; INTRAVENOUS at 09:07

## 2019-07-24 RX ADMIN — PROPOFOL 20 MG: 10 INJECTION, EMULSION INTRAVENOUS at 09:07

## 2019-07-24 RX ADMIN — PHENYLEPHRINE HYDROCHLORIDE 100 MCG: 10 INJECTION INTRAVENOUS at 08:07

## 2019-07-24 RX ADMIN — PROPOFOL 160 MG: 10 INJECTION, EMULSION INTRAVENOUS at 08:07

## 2019-07-24 RX ADMIN — GLYCOPYRROLATE 0.6 MG: 0.2 INJECTION, SOLUTION INTRAMUSCULAR; INTRAVENOUS at 09:07

## 2019-07-24 RX ADMIN — ROCURONIUM BROMIDE 10 MG: 10 INJECTION, SOLUTION INTRAVENOUS at 08:07

## 2019-07-24 RX ADMIN — SODIUM CHLORIDE, SODIUM GLUCONATE, SODIUM ACETATE, POTASSIUM CHLORIDE, MAGNESIUM CHLORIDE, SODIUM PHOSPHATE, DIBASIC, AND POTASSIUM PHOSPHATE: .53; .5; .37; .037; .03; .012; .00082 INJECTION, SOLUTION INTRAVENOUS at 08:07

## 2019-07-24 RX ADMIN — ROCURONIUM BROMIDE 40 MG: 10 INJECTION, SOLUTION INTRAVENOUS at 08:07

## 2019-07-24 RX ADMIN — SODIUM CHLORIDE: 0.9 INJECTION, SOLUTION INTRAVENOUS at 06:07

## 2019-07-24 RX ADMIN — ONDANSETRON 4 MG: 2 INJECTION INTRAMUSCULAR; INTRAVENOUS at 09:07

## 2019-07-24 RX ADMIN — FENTANYL CITRATE 25 MCG: 50 INJECTION INTRAMUSCULAR; INTRAVENOUS at 10:07

## 2019-07-24 RX ADMIN — FENTANYL CITRATE 25 MCG: 50 INJECTION, SOLUTION INTRAMUSCULAR; INTRAVENOUS at 09:07

## 2019-07-24 RX ADMIN — LIDOCAINE HYDROCHLORIDE 50 MG: 20 INJECTION, SOLUTION INTRAVENOUS at 08:07

## 2019-07-24 RX ADMIN — OXYCODONE HYDROCHLORIDE AND ACETAMINOPHEN 1 TABLET: 5; 325 TABLET ORAL at 09:07

## 2019-07-24 RX ADMIN — OXYCODONE AND ACETAMINOPHEN 1 TABLET: 5; 325 TABLET ORAL at 09:07

## 2019-07-24 RX ADMIN — CEFAZOLIN 2 G: 330 INJECTION, POWDER, FOR SOLUTION INTRAMUSCULAR; INTRAVENOUS at 08:07

## 2019-07-24 RX ADMIN — FENTANYL CITRATE 50 MCG: 50 INJECTION, SOLUTION INTRAMUSCULAR; INTRAVENOUS at 08:07

## 2019-07-24 NOTE — INTERVAL H&P NOTE
The patient has been examined and the H&P has been reviewed:    I concur with the findings and no changes have occurred since H&P was written.    Anesthesia/Surgery risks, benefits and alternative options discussed and understood by patient/family.          Active Hospital Problems    Diagnosis  POA    Gallstone pancreatitis [K85.10]  Yes      Resolved Hospital Problems   No resolved problems to display.

## 2019-07-24 NOTE — DISCHARGE INSTRUCTIONS
Cholecystectomy     Clips close off the duct connecting the gallbladder to the bile duct. The gallbladder is then removed.     Youve had painful attacks caused by gallstones. To treat the problem, your healthcare provider wants to remove your gallbladder. This surgery is called cholecystectomy. Removing the gallbladder can relieve pain. It will also prevent future attacks. You can live a healthy life without your gallbladder. You may also be able to go back to eating foods you enjoyed before your gallbladder problems started.  Before your surgery  Be prepared:  · Tell your provider what medicines you take. Include those bought over the counter. Also include herbs or supplements. Be sure to mention if you take prescription blood thinners. This includes warfarin, clopidogrel, and aspirin.  · Have any tests your provider asks for, such as blood tests.  · Dont eat or drink after midnight, the night before your surgery. This includes water, coffee, and mints. However, you may need to take some medicine with sips of water--talk with your healthcare provider.  The day of surgery  When you arrive, you will prepare for surgery:  · An IV line will be put into a vein in your arm or hand. This gives you fluids and medicine.  · An anesthesiologist will talk with you about anesthesia. This is medicine used to prevent pain. You will receive general anesthesia. This puts you into a state like deep sleep through the procedure.  During surgery  There are 2 methods for removing the gallbladder. Your healthcare provider will choose which method is best for you:  · Laparoscopic cholecystectomy. This is most common. During surgery, 2 to 4 small incisions are made. A thin tube with a camera is used. This is called a laparoscope. The scope is put through one of the incisions. It sends images to a video screen. Surgical tools are put through other incisions. The gallbladder is removed using the scope and these tools.  · Open  cholecystectomy. One larger incision is made. The surgeon sees and works through this incision. Open surgery is most often used when scarring or other factors make it a better choice for you.  In some cases, safety requires a change from laparoscopic to open surgery during the procedure.  After surgery  You will be sent to a room to wake up from the anesthesia. You will likely go home the same day. In some cases, an overnight stay is needed. If you had open cholecystectomy, you may need to stay in the hospital for a few days. When you are released to go home, have a family member or friend ready to drive you.  Risks and possible complications of gallbladder surgery  All surgeries have risks. The risks of gallbladder surgery include:  · Bleeding  · Infection  · Injury to the common bile duct or nearby organs  · Blood clots in the legs  · Bile leaks  · Hernia at incision site  · Pnemonia   Date Last Reviewed: 7/1/2016  © 5673-0191 [employer]. [address] [place] [address]. All rights reserved. This information is not intended as a substitute for professional medical care. Always follow your healthcare professional's instructions.      Recovery After Procedural Sedation (Adult)  You have been given medicine by vein to make you sleep during your surgery. This may have included both a pain medicine and sleeping medicine. Most of the effects have worn off. But you may still have some drowsiness for the next 6 to 8 hours.  Home care  Follow these guidelines when you get home:  · For the next 8 hours, you should be watched by a responsible adult. This person should make sure your condition is not getting worse.  · Don't drink any alcohol for the next 24 hours.  · Don't drive, operate dangerous machinery, or make important business or personal decisions during the next 24 hours.  Note: Your healthcare provider may tell you not to take any medicine by mouth for pain or sleep in the next 4 hours.  These medicines may react with the medicines you were given in the hospital. This could cause a much stronger response than usual.  Follow-up care  Follow up with your healthcare provider if you are not alert and back to your usual level of activity within 12 hours.  When to seek medical advice  Call your healthcare provider right away if any of these occur:  · Drowsiness gets worse  · Weakness or dizziness gets worse  · Repeated vomiting  · You can't be awakened   Date Last Reviewed: 10/18/2016  © 5208-2612 [employer]. [address] [place] [address]. All rights reserved. This information is not intended as a substitute for professional medical care. Always follow your healthcare professional's instructions.    PATIENT INSTRUCTIONS  POST-ANESTHESIA    IMMEDIATELY FOLLOWING SURGERY:  Do not drive or operate machinery for the first twenty four hours after surgery.  Do not make any important decisions for twenty four hours after surgery or while taking narcotic pain medications or sedatives.  If you develop intractable nausea and vomiting or a severe headache please notify your doctor immediately.    FOLLOW-UP:  Please make an appointment with your surgeon as instructed. You do not need to follow up with anesthesia unless specifically instructed to do so.    WOUND CARE INSTRUCTIONS (if applicable):  Keep a dry clean dressing on the anesthesia/puncture wound site if there is drainage.  Once the wound has quit draining you may leave it open to air.  Generally you should leave the bandage intact for twenty four hours unless there is drainage.  If the epidural site drains for more than 36-48 hours please call the anesthesia department.    QUESTIONS?:  Please feel free to call your physician or the hospital  if you have any questions, and they will be happy to assist you.       [facility] Anesthesia Department  [address] [facility] [address]  [phone]

## 2019-07-24 NOTE — OP NOTE
DATE OF PROCEDURE: 07/24/2019.     PREOPERATIVE DIAGNOSIS: Gallstone pancreatitis.     POSTOPERATIVE DIAGNOSIS: Same.     PROCEDURE PERFORMED: Laparoscopic cholecystectomy.     ATTENDING SURGEON: Huber Neri MD     RESIDENT: Kelly Robles MD (RES).    ANESTHESIA: General endotracheal.     INDICATIONS: Joan Rizvi is a 48 y.o.female referred to [facility] Surgery [facility] with a history of recurrent pancreatitis. The history and exam were consistent with gallstone pancreatitis, which was confirmed by laboratory studies and ultrasound. We recommended laparoscopic cholecystectomy and the patient agreed to proceed. The patient signed informed consent and expressed understanding of the risks and benefits of surgery.     OPERATIVE PROCEDURE: The patient was taken to the operating room and placed supine. After induction of general endotracheal tube anesthesia, the abdomen was prepped and draped in the standard fashion. Timeout was performed. A  infraumbilical skin incision was made. Subcutaneous tissue was bluntly dissected with S retractors. The fascia was directly visualized and two Kochers were placed on the fascia which was then incised with an 11 blade scalpel. The abdomen was bluntly entered under direct vision. An 0 Vicryl stay suture was placed around the fascial incision in a horizontal mattress fashion. A trocar was placed and the abdomen was insufflated with carbon dioxide to a maximum pressure of 15 mmHg. A 10-mm laparoscope was placed and the abdomen was examined. There was no evidence of injury from the initial trocar placement. Three 5-mm trocars were placed under direct vision through separate stab incisions, one subxiphoid and two in the right upper quadrant. We directed our attention to the right upper quadrant. The gallbladder was identified and noted to have minimal inflammatory change. The fundus was grasped and retracted cranially and the infundibulum was grasped and retracted laterally. We  bluntly dissected the peritoneal reflection off the infundibulum and neck of the gallbladder. With careful blunt dissection in this area, we were able to identify the cystic duct. Further careful dissection identified the cystic artery and we did obtain a critical view of safety. Both duct and artery were triply clipped and divided. The gallbladder was dissected off the gallbladder fossa using Bovie electrocautery from infundibulum to fundus until free. It was placed into an EndoCatch bag and removed from the umbilical port site with no difficulty. We returned the laparoscope and Danielle trocar to the umbilicus and reexamined the right upper quadrant. The gallbladder fossa was examined and no further bleeding or any bile leak were noted. The clips on the cystic duct and artery were examined and no bleeding or bile leak were noted. The right upper quadrant was irrigated with saline briefly until the returning effluent was clear. All ports were removed under direct vision and no bleeding from any port site was noted. The insufflation of the abdomen was evacuated and the laparoscope and the umbilical trocar were removed. The preexisting 0 Vicryl stitch had come free during removal of the trocar. A new 0 Vicryl figure of eight stitch was placed in the fascia. A small defect was noted to persistent at the superior aspect of the fascia, this was closed with a simple 0 Vicryl stitch. All port sites were infiltrated with Marcaine and closed in a subcuticular fashion. Sterile dressings were applied. The patient was extubated in the Operating Room and transported to the Recovery Room in stable condition. All sponge, instrument and needle counts were correct at the end of the case. I was present and scrubbed for the entire procedure.    ESTIMATED BLOOD LOSS: 10 mL.     FINDINGS: Minimal gallbladder inflammation.     SPECIMEN: Gallbladder.     DRAINS: None.     COMPLICATIONS: None.     Kelly Robles MD  Pager: [phone]  [phone]  General Surgery PGY-III  [facility]

## 2019-07-24 NOTE — BRIEF OP NOTE
[facility]  Brief Operative Note     SUMMARY     Surgery Date: 7/24/2019     Surgeon(s) and Role:     * Huber Neri MD - Primary     * Kelly Robles MD - Resident - Assisting    Pre-op Diagnosis:  Calculus of gallbladder without cholecystitis without obstruction [K80.20]    Post-op Diagnosis:  Post-Op Diagnosis Codes:     * Calculus of gallbladder without cholecystitis without obstruction [K80.20]    Procedure(s) (LRB):  CHOLECYSTECTOMY, LAPAROSCOPIC (N/A)    Anesthesia: General    Description of the findings of the procedure: Laparoscopic cholecystectomy.    Findings/Key Components: Laparoscopic cholecystectomy without complication.    Estimated Blood Loss: 10mL         Specimens:   Specimen (12h ago, onward)    Start     Ordered    07/24/19 0842  Specimen to Pathology - Surgery  Once     Comments:  1- Gallbladder- PERM     Start Status     07/24/19 0842 Collected (07/24/19 0901) Order ID: [id number]       07/24/19 0841          Discharge Note    SUMMARY     Admit Date: 7/24/2019    Discharge Date and Time:  07/24/2019 9:33 AM    Hospital Course (synopsis of major diagnoses, care, treatment, and services provided during the course of the hospital stay): Joan Rizvi underwent outpatient laparoscopic cholecystectomy as treatment for gallstone pancreatitis. She tolerated the procedure well and her post-op course was uncomplicated. Prior to discharge home on 07/24/2019 her pain was well controlled on oral medications and she tolerated a diet. She was discharged home in good condition on POD#0.       Final Diagnosis: Post-Op Diagnosis Codes:     * Calculus of gallbladder without cholecystitis without obstruction [K80.20]    Disposition: Home or Self Care    Follow Up/Patient Instructions:     Medications:  Reconciled Home Medications:      Medication List      START taking these medications    oxyCODONE-acetaminophen 5-325 mg per tablet  Commonly known as:  PERCOCET  Take 1 tablet by  mouth every 4 (four) hours as needed for Pain (Do not drive while taking pain medicatioons).        CONTINUE taking these medications    amLODIPine 5 MG tablet  Commonly known as:  NORVASC  Take 5 mg by mouth once daily.     busPIRone 5 MG Tab  Commonly known as:  BUSPAR  Take 5 mg by mouth 2 (two) times daily.     pantoprazole 40 MG tablet  Commonly known as:  PROTONIX  Take 40 mg by mouth once daily.     sucralfate 1 gram tablet  Commonly known as:  CARAFATE  Take 1 tablet (1 g total) by mouth 4 (four) times daily.          Discharge Procedure Orders   Diet Adult Regular     Lifting restrictions   Order Comments: Do not lift anything heavier than 10lbs for six weeks.     No driving until:   Order Comments: Do not drive while taking pain medications.     Notify your health care provider if you experience any of the following:  temperature >100.4     Notify your health care provider if you experience any of the following:  persistent nausea and vomiting or diarrhea     Notify your health care provider if you experience any of the following:  severe uncontrolled pain     Notify your health care provider if you experience any of the following:  redness, tenderness, or signs of infection (pain, swelling, redness, odor or green/yellow discharge around incision site)     No dressing needed   Order Comments: Incisions covered with dermabond (superglue), no dressing needed. Can keep covered with gauze as needed to protect clothing.     Activity as tolerated     Shower on day dressing removed (No bath)   Order Comments: Shower after 48 hours after surgery, do not submerge incisions for 6 weeks.     Follow-up Information     Huber Neri MD In 2 weeks.    Specialty:  General Surgery  Why:  Post-op  Contact information:  Anurag RAMIREZ  [facility] [address]  [phone]                 Kelly Robles MD  Pager: [phone]  General Surgery PGY-III  [facility]

## 2019-07-24 NOTE — ANESTHESIA POSTPROCEDURE EVALUATION
Anesthesia Post Evaluation    Patient: Joan Rizvi    Procedure(s) Performed: Procedure(s) (LRB):  CHOLECYSTECTOMY, LAPAROSCOPIC (N/A)    Final Anesthesia Type: general  Patient location during evaluation: PACU  Patient participation: Yes- Able to Participate  Level of consciousness: awake and alert and oriented  Post-procedure vital signs: reviewed and stable  Pain management: adequate  Airway patency: patent  PONV status at discharge: No PONV  Anesthetic complications: no      Cardiovascular status: hemodynamically stable  Respiratory status: unassisted, spontaneous ventilation and room air  Hydration status: euvolemic  Follow-up not needed.          Vitals Value Taken Time   /64 7/24/2019 11:31 AM   Temp 36.9 °C (98.4 °F) 7/24/2019 11:30 AM   Pulse 80 7/24/2019 11:39 AM   Resp 16 7/24/2019 11:30 AM   SpO2 97 % 7/24/2019 11:39 AM   Vitals shown include unvalidated device data.      Event Time     Out of Recovery 10:39:00          Pain/Larisa Score: Pain Rating Prior to Med Admin: 7 (7/24/2019 10:00 AM)  Larisa Score: 10 (7/24/2019 10:41 AM)

## 2019-07-24 NOTE — PLAN OF CARE
Patient and family state they are ready to be discharged. Instructions and prescription given to patient and family. Both verbalize understanding. Patient tolerating po liquids with no difficulty. Patient states pain is at a tolerable level for them. Anesthesia consent and surgical consent in chart upon patient's discharge from [facility].

## 2019-07-24 NOTE — TRANSFER OF CARE
"Anesthesia Transfer of Care Note    Patient: Joan Rizvi    Procedure(s) Performed: Procedure(s) (LRB):  CHOLECYSTECTOMY, LAPAROSCOPIC (N/A)    Patient location: PACU    Anesthesia Type: general    Transport from OR: Transported from OR on 6-10 L/min O2 by face mask with adequate spontaneous ventilation    Post pain: adequate analgesia    Post assessment: no apparent anesthetic complications    Post vital signs: stable    Level of consciousness: awake and alert    Nausea/Vomiting: no nausea/vomiting    Complications: none    Transfer of care protocol was followed      Last vitals:   Visit Vitals  /72 (BP Location: Left arm, Patient Position: Lying)   Pulse 81   Temp 36.8 °C (98.2 °F) (Oral)   Resp 18   Ht 5' 4" (1.626 m)   Wt 77.1 kg (170 lb)   LMP 07/01/2019   SpO2 99%   Breastfeeding? No   BMI 29.18 kg/m²     "

## 2019-08-08 ENCOUNTER — OFFICE VISIT (OUTPATIENT)
Dept: SURGERY | Facility: CLINIC | Age: 49
End: 2019-08-08
Payer: COMMERCIAL

## 2019-08-08 VITALS
WEIGHT: 162.81 LBS | DIASTOLIC BLOOD PRESSURE: 76 MMHG | SYSTOLIC BLOOD PRESSURE: 157 MMHG | BODY MASS INDEX: 27.8 KG/M2 | TEMPERATURE: 99 F | HEIGHT: 64 IN | HEART RATE: 89 BPM

## 2019-08-08 DIAGNOSIS — Z90.49 S/P LAPAROSCOPIC CHOLECYSTECTOMY: Primary | ICD-10-CM

## 2019-08-08 PROCEDURE — 99999 PR PBB SHADOW E&M-EST. PATIENT-LVL III: ICD-10-PCS | Mod: PBBFAC,,, | Performed by: PHYSICIAN ASSISTANT

## 2019-08-08 PROCEDURE — 99024 PR POST-OP FOLLOW-UP VISIT: ICD-10-PCS | Mod: S$GLB,,, | Performed by: PHYSICIAN ASSISTANT

## 2019-08-08 PROCEDURE — 99999 PR PBB SHADOW E&M-EST. PATIENT-LVL III: CPT | Mod: PBBFAC,,, | Performed by: PHYSICIAN ASSISTANT

## 2019-08-08 PROCEDURE — 99024 POSTOP FOLLOW-UP VISIT: CPT | Mod: S$GLB,,, | Performed by: PHYSICIAN ASSISTANT

## 2019-08-08 NOTE — PROGRESS NOTES
SUBJECTIVE:  The patient is a 48 y.o. y/o female 2 weeks s/p laparoscopic cholecystectomy. She denies pain, fevers, chills, nausea, vomiting, diarrhea, or constipation. Eating well with normal appetite and bowel function. Denies redness around or drainage from incisions.    OBJECTIVE:  GEN: female in NAD  ABD: soft, non-tender, non-distended  INCISIONS: clean, dry and intact, healing well without signs of infection or hernia    ASSESSMENT/PLAN:  Doing well 2 weeks s/p laparoscopic cholecystectomy for chronic cholecystitis. Patient is advised to avoid heavy lifting or strenuous activity for another 2-4 weeks. May resume light cardio. Patient may bathe and continue to take a regular diet. Will follow-up with me on an as-needed basis. All questions answered; patient is comfortable with follow-up plan.

## 2019-08-23 ENCOUNTER — HOSPITAL ENCOUNTER (OUTPATIENT)
Dept: RADIOLOGY | Facility: HOSPITAL | Age: 49
Discharge: HOME | End: 2019-08-23
Attending: INTERNAL MEDICINE
Payer: COMMERCIAL

## 2019-08-23 DIAGNOSIS — R93.89 ABNORMAL FINDING ON IMAGING: ICD-10-CM

## 2019-08-23 LAB
CREAT SERPL-MCNC: 0.6 MG/DL (ref 0.5–1.4)
SAMPLE: NORMAL

## 2019-08-23 PROCEDURE — 74170 CT ABD WO CNTRST FLWD CNTRST: CPT | Mod: 26,,, | Performed by: RADIOLOGY

## 2019-08-23 PROCEDURE — 74170 CT ABDOMEN W WO CONTRAST: ICD-10-PCS | Mod: 26,,, | Performed by: RADIOLOGY

## 2019-08-23 PROCEDURE — 74170 CT ABD WO CNTRST FLWD CNTRST: CPT | Mod: TC

## 2019-08-23 PROCEDURE — 25500020 PHARM REV CODE 255: Performed by: INTERNAL MEDICINE

## 2019-08-23 RX ADMIN — IOHEXOL 75 ML: 350 INJECTION, SOLUTION INTRAVENOUS at 10:08

## 2019-08-28 ENCOUNTER — TELEPHONE (OUTPATIENT)
Dept: ENDOSCOPY | Facility: HOSPITAL | Age: 49
End: 2019-08-28

## 2019-08-28 DIAGNOSIS — R93.5 ABNORMAL CT OF THE ABDOMEN: Primary | ICD-10-CM

## 2019-08-28 NOTE — TELEPHONE ENCOUNTER
----- Message from Kristian Wakefield MD sent at 8/27/2019  6:11 PM CDT -----  Marti- Please call pt and let her know that her CT still shows a spot in the pancreas. It has not changed, and likely just represents an area of inflammation, but I think we should take another look with EUS in the next 4-6 weeks to make sure. Please arrange for that if she's agreeable. If she wants to be seen in clinic first, that's fine too.

## 2019-08-29 ENCOUNTER — TELEPHONE (OUTPATIENT)
Dept: ENDOSCOPY | Facility: HOSPITAL | Age: 49
End: 2019-08-29

## 2019-09-03 ENCOUNTER — OFFICE VISIT (OUTPATIENT)
Dept: PRIMARY CARE CLINIC | Facility: CLINIC | Age: 49
End: 2019-09-03
Payer: COMMERCIAL

## 2019-09-03 VITALS
DIASTOLIC BLOOD PRESSURE: 72 MMHG | HEART RATE: 70 BPM | HEIGHT: 64 IN | SYSTOLIC BLOOD PRESSURE: 140 MMHG | BODY MASS INDEX: 28.12 KG/M2 | TEMPERATURE: 99 F | WEIGHT: 164.69 LBS

## 2019-09-03 DIAGNOSIS — I10 ESSENTIAL HYPERTENSION WITH GOAL BLOOD PRESSURE LESS THAN 130/80: ICD-10-CM

## 2019-09-03 DIAGNOSIS — K21.9 GASTROESOPHAGEAL REFLUX DISEASE WITHOUT ESOPHAGITIS: ICD-10-CM

## 2019-09-03 DIAGNOSIS — Z00.00 ANNUAL PHYSICAL EXAM: Primary | ICD-10-CM

## 2019-09-03 DIAGNOSIS — Z13.220 SCREENING CHOLESTEROL LEVEL: ICD-10-CM

## 2019-09-03 DIAGNOSIS — Z28.20 VACCINE REFUSED BY PATIENT: ICD-10-CM

## 2019-09-03 DIAGNOSIS — Z01.419 WELL WOMAN EXAM: ICD-10-CM

## 2019-09-03 DIAGNOSIS — Z01.89 ENCOUNTER FOR ROUTINE LABORATORY TESTING: ICD-10-CM

## 2019-09-03 DIAGNOSIS — Z11.3 SCREEN FOR STD (SEXUALLY TRANSMITTED DISEASE): ICD-10-CM

## 2019-09-03 DIAGNOSIS — N95.1 PERIMENOPAUSAL SYMPTOMS: ICD-10-CM

## 2019-09-03 DIAGNOSIS — R06.83 SNORING: ICD-10-CM

## 2019-09-03 DIAGNOSIS — Z12.31 SCREENING MAMMOGRAM, ENCOUNTER FOR: ICD-10-CM

## 2019-09-03 LAB
ALBUMIN/CREAT UR: 6.9 UG/MG (ref 0–30)
BACTERIA #/AREA URNS AUTO: ABNORMAL /HPF
BILIRUB UR QL STRIP: NEGATIVE
CLARITY UR REFRACT.AUTO: ABNORMAL
COLOR UR AUTO: YELLOW
CREAT UR-MCNC: 245 MG/DL (ref 15–325)
GLUCOSE UR QL STRIP: NEGATIVE
HGB UR QL STRIP: NEGATIVE
HYALINE CASTS UR QL AUTO: 2 /LPF
KETONES UR QL STRIP: ABNORMAL
LEUKOCYTE ESTERASE UR QL STRIP: ABNORMAL
MICROALBUMIN UR DL<=1MG/L-MCNC: 17 UG/ML
MICROSCOPIC COMMENT: ABNORMAL
NITRITE UR QL STRIP: POSITIVE
PH UR STRIP: 5 [PH] (ref 5–8)
PROT UR QL STRIP: NEGATIVE
RBC #/AREA URNS AUTO: 1 /HPF (ref 0–4)
SP GR UR STRIP: 1.02 (ref 1–1.03)
SQUAMOUS #/AREA URNS AUTO: 2 /HPF
URN SPEC COLLECT METH UR: ABNORMAL
WBC #/AREA URNS AUTO: 3 /HPF (ref 0–5)

## 2019-09-03 PROCEDURE — 87661 TRICHOMONAS VAGINALIS AMPLIF: CPT

## 2019-09-03 PROCEDURE — 99386 PREV VISIT NEW AGE 40-64: CPT | Mod: S$GLB,,, | Performed by: FAMILY MEDICINE

## 2019-09-03 PROCEDURE — 82043 UR ALBUMIN QUANTITATIVE: CPT

## 2019-09-03 PROCEDURE — 87491 CHLMYD TRACH DNA AMP PROBE: CPT | Mod: 59

## 2019-09-03 PROCEDURE — 87481 CANDIDA DNA AMP PROBE: CPT | Mod: 59

## 2019-09-03 PROCEDURE — 99999 PR PBB SHADOW E&M-EST. PATIENT-LVL V: ICD-10-PCS | Mod: PBBFAC,,, | Performed by: FAMILY MEDICINE

## 2019-09-03 PROCEDURE — 99386 PR PREVENTIVE VISIT,NEW,40-64: ICD-10-PCS | Mod: S$GLB,,, | Performed by: FAMILY MEDICINE

## 2019-09-03 PROCEDURE — 99999 PR PBB SHADOW E&M-EST. PATIENT-LVL V: CPT | Mod: PBBFAC,,, | Performed by: FAMILY MEDICINE

## 2019-09-03 PROCEDURE — 81001 URINALYSIS AUTO W/SCOPE: CPT

## 2019-09-03 RX ORDER — AMLODIPINE BESYLATE 10 MG/1
10 TABLET ORAL DAILY
Qty: 90 TABLET | Refills: 3 | Status: SHIPPED | OUTPATIENT
Start: 2019-09-03 | End: 2020-12-04 | Stop reason: SDUPTHER

## 2019-09-03 RX ORDER — AMLODIPINE BESYLATE 10 MG/1
TABLET ORAL
Refills: 2 | COMMUNITY
Start: 2019-07-27 | End: 2019-09-03 | Stop reason: SDUPTHER

## 2019-09-03 RX ORDER — PANTOPRAZOLE SODIUM 40 MG/1
40 TABLET, DELAYED RELEASE ORAL DAILY
Qty: 90 TABLET | Refills: 3 | Status: SHIPPED | OUTPATIENT
Start: 2019-09-03 | End: 2020-09-10

## 2019-09-03 NOTE — PROGRESS NOTES
Subjective:       Patient ID: Joan Rizvi is a 48 y.o. female.    Chief Complaint: Annual Exam; wants Gynecologic referral; Establish Care    49 yo female with a past medical history of Overweight BMI 28.27, Hypertension on amlodipine, GERD on Protonix, Hx of pancreatitis (discontinued HCTZ), s/p laparoscopic cholecystectomy for chronic cholecystitis on 7/24/2019;     Last gynecology exam in 2017 for well woman physical: Fibroids, menses lasting 3-4 days each months, endometrial ablation 3-4 years ago. Pap 6/13/2017 normal     She presents with concern for gynecological issues. States that her LMP was 7/1/2019, lasted 3 days. States menses were previously lasting 5 days but for the past 2 years it is of 3 days duration and lighter in flow. She is concerned that she had no menstrual bleeding from August, 2019 to present. She has had negative urine pregnancy tests. She gets hot flashes for 4 months and some vaginal dryness for 2 months. She reports weight loss of 15 lbs over 2 months; not unintentional. No hirsutism, abdominal pain, pelvic pain, urinary complaints, bowel changes, galactorrhea, acne.  No excessive exercise.    She reports some life stressors; was previously prescribed Buspar but states that she is doing well without the medication.     Her blood pressure is elevated. States that she is compliant with medication. Reviewed diet, exercise and alcohol intake which can be improved. She is otherwise asymptomatic. She reports daytime fatigue, loud snoring, respiratory related arousals.    The following portions of the patient's history were reviewed and updated as appropriate: allergies, current medications, past family history, past medical history, past social history, past surgical history and problem list.        Review of Systems   Constitutional: Positive for unexpected weight change. Negative for activity change, appetite change, chills, diaphoresis, fatigue and fever.   HENT: Negative for  "congestion, dental problem, facial swelling, hearing loss, nosebleeds, postnasal drip, rhinorrhea, sinus pain, sore throat, tinnitus and trouble swallowing.    Eyes: Negative for pain, discharge, itching and visual disturbance.   Respiratory: Negative for apnea, chest tightness, shortness of breath, wheezing and stridor.    Cardiovascular: Negative for chest pain, palpitations and leg swelling.   Gastrointestinal: Negative for abdominal distention, abdominal pain, blood in stool, constipation, diarrhea, nausea, rectal pain and vomiting.   Endocrine: Negative for cold intolerance, heat intolerance, polydipsia and polyuria.   Genitourinary: Positive for menstrual problem. Negative for difficulty urinating, dysuria, frequency, hematuria and urgency.   Musculoskeletal: Positive for neck pain. Negative for arthralgias, gait problem, joint swelling, myalgias and neck stiffness.   Skin: Negative for color change and rash.   Neurological: Positive for headaches. Negative for dizziness, tremors, seizures, syncope, facial asymmetry and weakness.   Hematological: Negative for adenopathy. Does not bruise/bleed easily.   Psychiatric/Behavioral: Positive for dysphoric mood. Negative for agitation, confusion, hallucinations, self-injury and suicidal ideas. The patient is not hyperactive.        Objective:       Vitals:    09/03/19 1458   BP: (!) 140/72   Pulse: 70   Temp: 98.5 °F (36.9 °C)   TempSrc: Oral   Weight: 74.7 kg (164 lb 10.9 oz)   Height: 5' 4" (1.626 m)     Physical Exam   Constitutional: She is oriented to person, place, and time. She appears well-developed and well-nourished. No distress.   HENT:   Head: Normocephalic and atraumatic.   Right Ear: External ear normal.   Left Ear: External ear normal.   Oral crowding   Eyes: Pupils are equal, round, and reactive to light. Conjunctivae and EOM are normal. Right eye exhibits no discharge. Left eye exhibits no discharge. No scleral icterus.   Neck: Normal range of motion. " Neck supple. No thyromegaly present.   Cardiovascular: Normal rate, regular rhythm, normal heart sounds and intact distal pulses. Exam reveals no gallop and no friction rub.   No murmur heard.  Pulmonary/Chest: Effort normal and breath sounds normal. No respiratory distress. She exhibits no tenderness.   Abdominal: Soft. Bowel sounds are normal. She exhibits no distension and no mass. There is no tenderness. There is no rebound and no guarding.   Genitourinary:   Genitourinary Comments: Pelvic:  Vagina and cervix without lesions, but mucosa is friable with thin scant blood tinged discharge. Uterus and adnexa/parametria nontender.     Musculoskeletal: Normal range of motion. She exhibits no edema.   Lymphadenopathy:     She has no cervical adenopathy.   Neurological: She is alert and oriented to person, place, and time. She displays normal reflexes. No cranial nerve deficit. She exhibits normal muscle tone. Coordination normal.   Skin: Skin is warm. Capillary refill takes less than 2 seconds. No rash noted. She is not diaphoretic.   Psychiatric: She has a normal mood and affect. Judgment and thought content normal.       Assessment:       1. Annual physical exam    2. Essential hypertension with goal blood pressure less than 130/80    3. Gastroesophageal reflux disease without esophagitis    4. Snoring    5. BMI 28.0-28.9,adult    6. Perimenopausal symptoms    7. Screening mammogram, encounter for    8. Screen for STD (sexually transmitted disease)    9. Screening cholesterol level    10. Encounter for routine laboratory testing    11. Well woman exam    12. Vaccine refused by patient        Plan:       1. Annual physical exam  Age appropriate prevention guidelines implemented, unless patient refused  Encourage Advanced directives  Labs ordered   Immunizations reviewed. Encourage yearly influenza vaccine.  Patient Counseling:  --Nutrition: Encourage healthy food choices, adequate water intake, moderate  sodium/caffeine intake, diet low in saturated fat and cholesterol. Importance of caloric balance and sufficient intake of fresh fruits, vegetables, fiber, calcium, iron, and 1 mg of folate supplement per day (for females capable of pregnancy).  --Exercise: Stressed the importance of regular exercise.   --Substance Abuse: Abstinence from tobacco products. No more than 1 alcoholic drink per day in women. Avoid illicit drugs.   --Sexuality: Safe sex practices to avoid sexually transmitted diseases; careful partner selection or abstinence per patient's preference   --Injury prevention: encourage safety belts, safety helmets, smoke detector.  --Dental health: Discussed importance of regular tooth brushing X2 daily, flossing X1 daily, and routine dental visits.  --Encourage use of sun screen, wear sun protective clothing  --Encourage routine eye exams    2. Essential hypertension with goal blood pressure less than 130/80  Hx of gallstone pancreatitis, no HCTZ  -     amLODIPine (NORVASC) 10 MG tablet; Take 1 tablet (10 mg total) by mouth once daily.  Dispense: 90 tablet; Refill: 3  -     Follow-up primary physician; Future; Expected date: 03/03/2020 re: Blood pressure  -     Microalbumin/creatinine urine ratio  -     Ambulatory referral to Sleep Disorders rule out sleep apnea    3. Gastroesophageal reflux disease without esophagitis  -     pantoprazole (PROTONIX) 40 MG tablet; Take 1 tablet (40 mg total) by mouth once daily.  Dispense: 90 tablet; Refill: 3    4. Snoring  -     Ambulatory referral to Sleep Disorders; respiratory related arousals, daytime fatigue and snoring.  She has elevated blood pressure and BMI.  Weight loss advised.    5. BMI 28.0-28.9,adult  Diet and exercise encouraged.    6. Perimenopausal symptoms, discussed etiology and what can be expected going forward  States urine pregnancy test is negative  -     CBC Without Differential; Future  -     Comprehensive metabolic panel; Future  -     TSH;  Future    7. Screening mammogram, encounter for  -     Mammo Digital Screening Bilat w/ Torsten; Future    8. Screen for STD (sexually transmitted disease), per patient's consent  -     HIV 1/2 Ag/Ab (4th Gen); Future  -     RPR; Future  -     Vaginosis Screen by DNA Probe  -     C. trachomatis/N. gonorrhoeae by AMP DNA [facility]; Cervicovaginal  -     Hepatitis panel, acute; Future    9. Screening cholesterol level  -     Lipid panel; Future    10. Encounter for routine laboratory testing  -     CBC Without Differential; Future  -     Comprehensive metabolic panel; Future  -     Urinalysis    11. Well woman exam  -     Ambulatory referral to Gynecology.  Patient is requesting a referral.    Disclaimer: This note has been generated using voice-recognition software. There may be typographical errors that have been missed during proof-reading

## 2019-09-03 NOTE — PATIENT INSTRUCTIONS
Pap  Does this test have other names?  Pap smear, cervical cytology, Papanicolaou test, Pap smear test, vaginal smear technique  What is this test?  This test check the cells from inside the cervix for any changes that could lead to cancer. The cervix is the lower part of a woman's uterus that opens into the vagina.  This test is named after Robert Squires MD, one of the doctors who developed this technique of testing for cervical cancer.  Why do I need this test?  The Pap test is most often used as a screening test to look for cervical cancer or changes in cervical cells that might eventually lead to cancer. Major medical groups generally recommend that women get regular Pap tests, usually every 2 to 3 years, starting at age 21. Getting a regular Pap test can be life-saving. Cervical cancer is one of the most serious types of cancer in women.  If your test shows abnormal cells, your healthcare provider may be able to find and treat cervical problems right away, or stop cervical cancer before it becomes life-threatening. Pap tests can also diagnose serious infections and pelvic inflammation.   What other tests might I have along with this test?  You will likely have a pelvic exam along with this test. Depending on your age and other factors, your tissue samples may also be tested for human papillomavirus (HPV) infection at the same time your Pap test is done. Infection with some types of HPV puts you at risk for cervical cancer.  If you have an abnormal Pap test result, your healthcare provider may order other tests. These may include:  · Colposcopy. Your cervix and vagina are looked at with a microscope called a colposcope, which magnifies any abnormal areas.  · Endocervical curettage. Cells are taken from the opening of your cervix with a spoon-shaped tool and looked at under a microscope. This may be done during the colposcopy.  · Biopsy. A small tissue sample is taken from your cervix and looked at  under a microscope. This may be done during the colposcopy.   What do my test results mean?  Many things may affect your lab test results. These include the method each lab uses to do the test. Even if your test results are different from the normal value, you may not have a problem. To learn what the results mean for you, talk with your healthcare provider.  Your results will either be normal or abnormal. If you get an abnormal result, this usually does not mean that you have cancer. It often means a minor cervical problem. Your healthcare provider may do another Pap test to confirm the initial results. Or he or she may recommend other tests such as colposcopy.  Occasionally a lab test has a false-positive result. This means you do not have a cervical problem even though the test result shows you do.   How is this test done?  This test requires a sample of cervical cells. For the test, you lie on your back with your knees bent and your feet in stirrups, then relax and spread your legs. As part of a pelvic exam, your healthcare provider first checks your vagina and reproductive organs for infections and health problems.  Then your provider uses a device called a speculum to open the vagina. The provider examines your cervix and scrapes off a few cells from inside your cervix..  Some women may have slight discomfort when the speculum is inserted.  Does this test pose any risks?  This test poses no known risks.  What might affect my test results?  Using vaginal lubricants, cleansers, contraceptives, or creams may mask your symptoms. Avoid using vaginal douches and abstain from sex for 2 days before an exam. Using these products or having sex may wash away or disguise abnormal cervical cells.  How do I get ready for this test?  It may seem like a good idea to wash up before having a Pap test, but this can actually erase the signs of a health problem. For accurate test results, avoid having sex or using tampons,  douches, vaginal creams, deodorant sprays and powders, and contraceptive foams and jellies for 2 days before your exam.  Don't have the test while you're menstruating. The ideal time to have a Pap test is 10 to 20 days after the first day of your last period.           © 4653-2978 [employer]. [address] [place] [address]. All rights reserved. This information is not intended as a substitute for professional medical care. Always follow your healthcare professional's instructions.        Understanding Menopause  Menopause marks the point where youve gone 12 months in a row without a period. The average age for this is around 51, but it can happen at younger or older ages. During the months or years before menopause, your body goes through many changes. It may be helpful to understand these changes and what you can do about the symptoms that result.     Use a portable fan to help stay cool.    Symptoms  Perimenopause is sometimes called the menopause transition. It happens in the months or years before menopause. It may begin when you reach your mid-40s. During this time, your estrogen levels go up and down and then decrease. As a result, you may notice some of the following symptoms:  · Menstrual periods that come more or less often than usual  · Menstrual periods that are lighter or heavier than normal  · Increased premenstrual syndrome (PMS) symptoms  · Hot flashes  · Night sweats  · Mood swings  · Vaginal dryness with possible painful sexual activity  · Difficulty going to sleep or staying asleep  · Decreased sexual drive and function  · Urinating frequently  It is important to remember that you could become pregnant until 12 months have passed since your last menstrual period. Ask your healthcare provider about birth control choices.   Controlling symptoms  Your healthcare provider may suggest pills or an intrauterine device (IUD) that contain the hormone progesterone. This can make  your periods more regular and prevent excess bleeding. If you have symptoms due to lower estrogen levels, your healthcare provider may suggest pills that contain estrogen and/or progesterone. This is called hormone therapy (HT).  There are also other prescription medicines that help control some of the bothersome symptoms, like hot flashes, mood swings, and vaginal dryness.  Other ways for you to deal with symptoms are listed below.  · Hot flashes. Wear layers that you can remove. Try all-cotton clothing, sheets, and blankets. Keep a glass of cold water by your bed.  · Pain during sex. You can buy a water-based lubricant or vaginal moisturizer in the drugstore that may help. Your healthcare provider may also prescribe an estrogen cream for your vagina.  · Mood swings. Talking to friends who are going through the same changes can sometimes help.  Date Last Reviewed: 12/1/2016  © 1850-6136 [employer]. [address] [place] [address]. All rights reserved. This information is not intended as a substitute for professional medical care. Always follow your healthcare professional's instructions.        Hormone Changes During Menopause  Menopause is not a sudden change. During the months or years before menopause (perimenopause), your ovaries begin to run out of eggs. Your body makes less estrogen and progesterone. This may bring on symptoms such as hot flashes. Youve reached menopause when you have not had a period for 1 year. From that point on, you are in postmenopause.  Perimenopause  In the years leading up to menopause, your ovaries make less estrogen. You release fewer eggs and your periods become less regular.  Symptoms you may have:  · Heavier or lighter periods  · Longer or shorter time between periods  · Hot flashes  · Mood swings  · Night sweats  · Insomnia  · Vaginal dryness  · Urinary changes including incontinence and frequency  Postmenopause  After menopause, you make very little  estrogen. As a result, the uterine lining does not thicken and your periods have ended.  Symptoms you may have:  · No periods  · Vaginal dryness  · Hot flashes  · Mood swings  · Night sweats  · Insomnia  Surgical menopause  Menopause can occur after a surgical removal of the uterus (hysterectomy) if the ovaries are also removed. Estrogen and progesterone levels decrease quickly. This may cause sudden and severe symptoms.   Date Last Reviewed: 5/13/2015  © 4057-6482 [employer]. [address] [place] [address], [place] [address]. All rights reserved. This information is not intended as a substitute for professional medical care. Always follow your healthcare professional's instructions.        Preventing Osteoporosis: Meeting Your Calcium Needs    Your body needs calcium to build and repair bones. But it can't make calcium on its own. That's why it's important to eat calcium-rich foods. Some foods are naturally rich in calcium. Others have calcium added (fortified). It's best to get calcium from the foods you eat. But if you can't get enough, you may want to take calcium supplements. To meet your daily calcium needs, try the foods listed below.  Dairy Fish & beans Other sources      Source   Calcium (mg) per serving   Source   Calcium (mg) per serving   Source   Calcium (mg) per serving      Low-fat yogurt, plain   415 mg/8 oz.   Sardines, Atlantic, canned, with bones   351 mg/3 oz.   Oatmeal, instant, fortified   215 mg/1 cup   Nonfat milk   302 mg/1 cup   [place], sockeye, canned, with bones   239 mg/3 oz.   Tofu made with calcium sulfate   204 mg/3 oz.   Low-fat milk   297 mg/1 cup   Soybeans, fresh, boiled   131 mg/1/2 cup   Collards   179 mg/1/2 cup   Swiss cheese   272 mg/1 oz.   White beans, cooked   81 mg/1/2 cup   English muffin, whole wheat   175 mg/1 muffin   Cheddar cheese   205 mg/1 oz.   Navy beans, cooked   79 mg/1/2 cup   Kale   90 mg/1/2 cup   Ice cream strawberry   79 mg/1/2 cup            Orange, navel   56 mg/1 medium   Note: Calcium levels may vary depending on brand and size.  Daily calcium needs  14-18 years old: 1,300 mg  19-30 years old: 1,000 mg  31-50 years old: 1,000 mg  51-70 years old, women: 1,200 mg  51-70 years old, men: 1,000 mg  Pregnant or nursin-28 years old: 1,300 mg, 19-50 years old: 1,000 mg  Older than 70 (women and men): 1,200 mg   Date Last Reviewed: 10/17/2015  © 0764-6386 [employer]. [address] [place] [address]. All rights reserved. This information is not intended as a substitute for professional medical care. Always follow your healthcare professional's instructions.        Preventing Osteoporosis: Avoiding Bone Loss  Certain factors can speed up bone loss or decrease bone growth. For example, alcohol, cigarettes, and certain medicines reduce bone mass. Some foods make it hard for your body to absorb calcium.    Things to avoid  Here are things to avoid to help prevent osteoporosis:  · Alcohol is toxic to bones. It is a major cause of bone loss. Heavy drinking can cause osteoporosis even if you have no other risk factors.  · Smoking reduces bone mass. Smoking may also interfere with estrogen levels and cause early menopause.  · Inactivity makes your bones lose strength and become thinner. Over time, thin bones may break. Women who aren't active are at a high risk for osteoporosis.  · Certain medicines, such as cortisone, increase bone loss. They also decrease bone growth. Ask your healthcare provider about any side effects of your medicines, and how to prevent them.  · Protein-rich or salty foods eaten in large amounts may deplete calcium.  · Caffeine increases calcium loss. People who drink a lot of coffee, tea, or ben lose more calcium than those who don't.  Date Last Reviewed: 10/17/2015  © 1829-6348 [employer]. [address] [place] [address]. All rights reserved. This information is not intended as a  substitute for professional medical care. Always follow your healthcare professional's instructions.        Osteoporosis: Understanding Bone Loss     A balanced system keeps building and resorbing bone.   The body has a natural system for maintaining bone. Understanding this system can help you learn how to maintain your bones.  A balanced system supports the body  The body is always losing (resorbing) and making bone. This process is called remodeling. Bone-resorbing cells take bone apart. They do this so the minerals can be used to repair an injury or make new bone. Bone-making cells form new bone using calcium and other minerals. These minerals come from the food you eat. When this bone-making system is in balance, the same amount of bone is built and resorbed.        An unbalanced system cant give support     An unblalanced system builds too little bone and resorbs too much.   Changes in hormone levels, activity, medications, or diet can affect the bone-making system. When the system gets out of balance, the amount of bone lost is greater than the amount of bone made. This can cause osteopenia (when bone starts to become less dense). Left untreated, bone loss gets worse, leading to osteoporosis. Weak bones cant support the body. In fact, they can fracture just from the weight of your body. This often happens in vertebrae (bones of the spine). When vertebrae fracture, parts of the spine compress. This causes the back to bend or hump over, and it can also cause back pain.  Date Last Reviewed: 10/11/2015  © 5209-2711 [employer]. [address] [place] [address]. All rights reserved. This information is not intended as a substitute for professional medical care. Always follow your healthcare professional's instructions.        Low-Salt Diet  This diet removes foods that are high in salt. It also limits the amount of salt you use when cooking. It is most often used for people with high blood  pressure, edema (fluid retention), and kidney, liver, or heart disease.  Table salt contains the mineral sodium. Your body needs sodium to work normally. But too much sodium can make your health problems worse. Your healthcare provider is recommending a low-salt (also called low-sodium) diet for you. Your total daily allowance of salt is 1,500 to 2,300 milligrams (mg). It is less than 1 teaspoon of table salt. This means you can have only about 500 to 700 mg of sodium at each meal. People with certain health problems should limit salt intake to the lower end of the recommended range.    When you cook, dont add much salt. If you can cook without using salt, even better. Dont add salt to your food at the table.  When shopping, read food labels. Salt is often called sodium on the label. Choose foods that are salt-free, low salt, or very low salt. Note that foods with reduced salt may not lower your salt intake enough.    Beans, potatoes, and pasta  Ok: Dry beans, split peas, lentils, potatoes, rice, macaroni, pasta, spaghetti without added salt  Avoid: Potato chips, tortilla chips, and similar products  Breads and cereals  Ok: Low-sodium breads, rolls, cereals, and cakes; low-salt crackers, matzo crackers  Avoid: Salted crackers, pretzels, popcorn, [place] citizen toast, pancakes, muffins  Dairy  Ok: Milk, chocolate milk, hot chocolate mix, low-salt cheeses, and yogurt  Avoid: Processed cheese and cheese spreads; Roquefort, Camembert, and cottage cheese; buttermilk, instant breakfast drink  Desserts  Ok: Ice cream, frozen yogurt, juice bars, gelatin, cookies and pies, sugar, honey, jelly, hard candy  Avoid: Most pies, cakes and cookies prepared or processed with salt; instant pudding  Drinks  Ok: Tea, coffee, fizzy (carbonated) drinks, juices  Avoid: Flavored coffees, electrolyte replacement drinks, sports drinks  Meats  Ok: All fresh meat, fish, poultry, low-salt tuna, eggs, egg substitute  Avoid: Smoked, pickled,  brine-cured, or salted meats and fish. This includes huber, chipped beef, corned beef, hot dogs, deli meats, ham, kosher meats, salt pork, sausage, canned tuna, salted codfish, smoked salmon, herring, sardines, or anchovies.  Seasonings and spices  Ok: Most seasonings are okay. Good substitutes for salt include: fresh herb blends, hot sauce, lemon, garlic, freedman, vinegar, dry mustard, parsley, cilantro, horseradish, tomato paste, regular margarine, mayonnaise, unsalted butter, cream cheese, vegetable oil, cream, low-salt salad dressing and gravy.  Avoid: Regular ketchup, relishes, pickles, soy sauce, teriyaki sauce, Worcestershire sauce, BBQ sauce, tartar sauce, meat tenderizer, chili sauce, regular gravy, regular salad dressing, salted butter  Soups  Ok: Low-salt soups and broths made with allowed foods  Avoid: Bouillon cubes, soups with smoked or salted meats, regular soup and broth  Vegetables  Ok: Most vegetables are okay; also low-salt tomato and vegetable juices  Avoid: Sauerkraut and other brine-soaked vegetables; pickles and other pickled vegetables; tomato juice, olives  Date Last Reviewed: 8/1/2016 © 2000-2017 [employer]. [address] [place] [address], [place] [address]. All rights reserved. This information is not intended as a substitute for professional medical care. Always follow your healthcare professional's instructions.

## 2019-09-04 LAB
C TRACH DNA SPEC QL NAA+PROBE: NOT DETECTED
N GONORRHOEA DNA SPEC QL NAA+PROBE: NOT DETECTED

## 2019-09-05 LAB
BACTERIAL VAGINOSIS DNA: NEGATIVE
CANDIDA GLABRATA DNA: NEGATIVE
CANDIDA KRUSEI DNA: NEGATIVE
CANDIDA RRNA VAG QL PROBE: NEGATIVE
T VAGINALIS RRNA GENITAL QL PROBE: POSITIVE

## 2019-09-06 ENCOUNTER — TELEPHONE (OUTPATIENT)
Dept: ENDOSCOPY | Facility: HOSPITAL | Age: 49
End: 2019-09-06

## 2019-09-08 ENCOUNTER — PATIENT MESSAGE (OUTPATIENT)
Dept: ENDOSCOPY | Facility: HOSPITAL | Age: 49
End: 2019-09-08

## 2019-09-09 ENCOUNTER — TELEPHONE (OUTPATIENT)
Dept: ENDOSCOPY | Facility: HOSPITAL | Age: 49
End: 2019-09-09

## 2019-09-09 ENCOUNTER — TELEPHONE (OUTPATIENT)
Dept: PRIMARY CARE CLINIC | Facility: CLINIC | Age: 49
End: 2019-09-09

## 2019-09-09 NOTE — TELEPHONE ENCOUNTER
----- Message from Jessica Dale sent at 9/9/2019 10:49 AM CDT -----  Contact: [phone]  Type: Rx    Name of medication(s): Antibiotic    Is this a refill? New rx? Refill     Pharmacy Name, Phone, & Location:[facility]/pharmacy #[id number] - [place] - [address] [facility]   [phone] (Phone)  [phone] (Fax)        Comments:please advise, thanks

## 2019-09-09 NOTE — TELEPHONE ENCOUNTER
Spoke with patient. EUS scheduled for 10/2 at 10a. Reviewed prep instructions. Ms Lee verbalized understanding.

## 2019-09-09 NOTE — TELEPHONE ENCOUNTER
----- Message from Tamara Chilel MA sent at 9/9/2019 10:40 AM CDT -----  Contact: [phone]  Type:  Patient Returning Call     Who Left Message for Patient: Maryam    Does the patient know what this is regarding?: yes, to set up an EUS procedure      Best Call Back Number: [phone]    Additional Information:  Please try again

## 2019-09-10 ENCOUNTER — TELEPHONE (OUTPATIENT)
Dept: ENDOSCOPY | Facility: HOSPITAL | Age: 49
End: 2019-09-10

## 2019-09-10 NOTE — TELEPHONE ENCOUNTER
Spoke with patient to review medical history and medications for EUS procedure. Patient was in a meeting. Patient stated she will call me 9/11/19.

## 2019-09-12 ENCOUNTER — TELEPHONE (OUTPATIENT)
Dept: ENDOSCOPY | Facility: HOSPITAL | Age: 49
End: 2019-09-12

## 2019-10-02 ENCOUNTER — PATIENT MESSAGE (OUTPATIENT)
Dept: ENDOSCOPY | Facility: HOSPITAL | Age: 49
End: 2019-10-02

## 2019-10-14 ENCOUNTER — TELEPHONE (OUTPATIENT)
Dept: ENDOSCOPY | Facility: HOSPITAL | Age: 49
End: 2019-10-14

## 2019-10-17 ENCOUNTER — TELEPHONE (OUTPATIENT)
Dept: ENDOSCOPY | Facility: HOSPITAL | Age: 49
End: 2019-10-17

## 2019-10-18 ENCOUNTER — TELEPHONE (OUTPATIENT)
Dept: ENDOSCOPY | Facility: HOSPITAL | Age: 49
End: 2019-10-18

## 2019-10-18 NOTE — TELEPHONE ENCOUNTER
----- Message from Tucker Mchugh sent at 10/18/2019  1:33 PM CDT -----  Contact: Pt  Type:  Needs Medical Advice    Who Called: The Pt states that she was returning a call to Marti and would like a call back please.    Best Call Back Number: [phone]

## 2019-10-21 ENCOUNTER — TELEPHONE (OUTPATIENT)
Dept: ENDOSCOPY | Facility: HOSPITAL | Age: 49
End: 2019-10-21

## 2019-10-21 NOTE — TELEPHONE ENCOUNTER
Called about UEUS scheduled 11/6/19 at 1400.  Left voicemail with call back number for questions.  Instructions mailed.

## 2019-10-22 ENCOUNTER — TELEPHONE (OUTPATIENT)
Dept: SLEEP MEDICINE | Facility: CLINIC | Age: 49
End: 2019-10-22

## 2019-11-06 ENCOUNTER — HOSPITAL ENCOUNTER (OUTPATIENT)
Facility: HOSPITAL | Age: 49
Discharge: HOME | End: 2019-11-06
Attending: INTERNAL MEDICINE | Admitting: INTERNAL MEDICINE
Payer: COMMERCIAL

## 2019-11-06 ENCOUNTER — ANESTHESIA (OUTPATIENT)
Dept: ENDOSCOPY | Facility: HOSPITAL | Age: 49
End: 2019-11-06
Payer: COMMERCIAL

## 2019-11-06 ENCOUNTER — ANESTHESIA EVENT (OUTPATIENT)
Dept: ENDOSCOPY | Facility: HOSPITAL | Age: 49
End: 2019-11-06
Payer: COMMERCIAL

## 2019-11-06 VITALS
DIASTOLIC BLOOD PRESSURE: 67 MMHG | HEART RATE: 86 BPM | WEIGHT: 155 LBS | TEMPERATURE: 98 F | OXYGEN SATURATION: 97 % | HEIGHT: 63 IN | RESPIRATION RATE: 20 BRPM | BODY MASS INDEX: 27.46 KG/M2 | SYSTOLIC BLOOD PRESSURE: 139 MMHG

## 2019-11-06 DIAGNOSIS — K86.9 PANCREATIC LESION: Primary | ICD-10-CM

## 2019-11-06 LAB
B-HCG UR QL: NEGATIVE
CTP QC/QA: YES

## 2019-11-06 PROCEDURE — 63600175 PHARM REV CODE 636 W HCPCS: Performed by: NURSE ANESTHETIST, CERTIFIED REGISTERED

## 2019-11-06 PROCEDURE — D9220A PRA ANESTHESIA: ICD-10-PCS | Mod: ANES,,, | Performed by: ANESTHESIOLOGY

## 2019-11-06 PROCEDURE — 37000008 HC ANESTHESIA 1ST 15 MINUTES: Performed by: INTERNAL MEDICINE

## 2019-11-06 PROCEDURE — D9220A PRA ANESTHESIA: Mod: CRNA,,, | Performed by: NURSE ANESTHETIST, CERTIFIED REGISTERED

## 2019-11-06 PROCEDURE — D9220A PRA ANESTHESIA: Mod: ANES,,, | Performed by: ANESTHESIOLOGY

## 2019-11-06 PROCEDURE — 43259 EGD US EXAM DUODENUM/JEJUNUM: CPT | Mod: ,,, | Performed by: INTERNAL MEDICINE

## 2019-11-06 PROCEDURE — 43259 PR ENDOSCOPIC ULTRASOUND EXAM: ICD-10-PCS | Mod: ,,, | Performed by: INTERNAL MEDICINE

## 2019-11-06 PROCEDURE — D9220A PRA ANESTHESIA: ICD-10-PCS | Mod: CRNA,,, | Performed by: NURSE ANESTHETIST, CERTIFIED REGISTERED

## 2019-11-06 PROCEDURE — 63600175 PHARM REV CODE 636 W HCPCS: Performed by: ANESTHESIOLOGY

## 2019-11-06 PROCEDURE — 37000009 HC ANESTHESIA EA ADD 15 MINS: Performed by: INTERNAL MEDICINE

## 2019-11-06 PROCEDURE — 43259 EGD US EXAM DUODENUM/JEJUNUM: CPT | Performed by: INTERNAL MEDICINE

## 2019-11-06 RX ORDER — SODIUM CHLORIDE 0.9 % (FLUSH) 0.9 %
10 SYRINGE (ML) INJECTION
Status: ACTIVE | OUTPATIENT
Start: 2019-11-06

## 2019-11-06 RX ORDER — SODIUM CHLORIDE 9 MG/ML
INJECTION, SOLUTION INTRAVENOUS CONTINUOUS
Status: DISCONTINUED | OUTPATIENT
Start: 2019-11-06 | End: 2019-11-06 | Stop reason: HOSPADM

## 2019-11-06 RX ORDER — PROPOFOL 10 MG/ML
VIAL (ML) INTRAVENOUS
Status: DISCONTINUED | OUTPATIENT
Start: 2019-11-06 | End: 2019-11-06

## 2019-11-06 RX ORDER — LIDOCAINE HCL/PF 100 MG/5ML
SYRINGE (ML) INTRAVENOUS
Status: DISCONTINUED | OUTPATIENT
Start: 2019-11-06 | End: 2019-11-06

## 2019-11-06 RX ORDER — SODIUM CHLORIDE 9 MG/ML
INJECTION, SOLUTION INTRAVENOUS CONTINUOUS
Status: ACTIVE | OUTPATIENT
Start: 2019-11-06

## 2019-11-06 RX ORDER — SODIUM CHLORIDE 0.9 % (FLUSH) 0.9 %
10 SYRINGE (ML) INJECTION
Status: DISCONTINUED | OUTPATIENT
Start: 2019-11-06 | End: 2019-11-06 | Stop reason: HOSPADM

## 2019-11-06 RX ORDER — ONDANSETRON 2 MG/ML
4 INJECTION INTRAMUSCULAR; INTRAVENOUS DAILY PRN
Status: DISCONTINUED | OUTPATIENT
Start: 2019-11-06 | End: 2019-11-06 | Stop reason: HOSPADM

## 2019-11-06 RX ORDER — ONDANSETRON 2 MG/ML
INJECTION INTRAMUSCULAR; INTRAVENOUS
Status: DISCONTINUED | OUTPATIENT
Start: 2019-11-06 | End: 2019-11-06

## 2019-11-06 RX ADMIN — ONDANSETRON 4 MG: 2 INJECTION INTRAMUSCULAR; INTRAVENOUS at 02:11

## 2019-11-06 RX ADMIN — PROPOFOL 50 MG: 10 INJECTION, EMULSION INTRAVENOUS at 02:11

## 2019-11-06 RX ADMIN — PROPOFOL 100 MG: 10 INJECTION, EMULSION INTRAVENOUS at 02:11

## 2019-11-06 RX ADMIN — LIDOCAINE HYDROCHLORIDE 80 MG: 20 INJECTION, SOLUTION INTRAVENOUS at 02:11

## 2019-11-06 NOTE — ANESTHESIA PREPROCEDURE EVALUATION
11/06/2019  Joan Rizvi is a 49 y.o., female.    Anesthesia Evaluation    I have reviewed the Patient Summary Reports.    I have reviewed the Nursing Notes.   I have reviewed the Medications.     Review of Systems  Anesthesia Hx:  No problems with previous Anesthesia   Denies Personal Hx of Anesthesia complications.   Cardiovascular:   Hypertension, well controlled Denies MI.  Denies CAD.     Denies Coronary Artery Disease.  Hypertension, Essential Hypertension , Pt in Pre-HTN range, systolic 130 - 139 or diastolic 85 - 89, Well Controlled on Rx    Pulmonary:   Denies COPD.  Denies Asthma.  Denies Asthma.  Denies Chronic Obstructive Pulmonary Disease (COPD).    Renal/:  Denies Kidney Function/Disease    Hepatic/GI:   GERD, well controlled  Esophageal / Stomach Disorders Gerd Controlled by chronic antireflux medication.  Denies Liver Disease    Neurological:   Denies TIA. Denies CVA. Denies Seizures.  Denies Seizure Disorder  Denies CVA - Cerebrovasular Accident  Denies TIA - Transient Ischemic Attack    Endocrine:   Denies Diabetes. Denies Hypothyroidism.  Denies Diabetes  Denies Thyroid Disease        Physical Exam  General:  Well nourished    Airway/Jaw/Neck:  Airway Findings: Mouth Opening: Normal Tongue: Normal  General Airway Assessment: Adult  Mallampati: III  Improves to II with phonation.  TM Distance: 4 - 6 cm      Dental:  Dental Findings: In tact   Chest/Lungs:  Chest/Lungs Findings: Clear to auscultation, Normal Respiratory Rate     Heart/Vascular:  Heart Findings: Rate: Normal  Rhythm: Regular Rhythm  Sounds: Normal        Mental Status:  Mental Status Findings:  Cooperative, Alert and Oriented         Anesthesia Plan  Type of Anesthesia, risks & benefits discussed:  Anesthesia Type:  general  Patient's Preference:   Intra-op Monitoring Plan: standard ASA monitors  Intra-op Monitoring  Plan Comments:   Post Op Pain Control Plan: multimodal analgesia, per primary service following discharge from PACU and IV/PO Opioids PRN  Post Op Pain Control Plan Comments:   Induction:   IV  Beta Blocker:  Patient is not currently on a Beta-Blocker (No further documentation required).       Informed Consent: Patient understands risks and agrees with Anesthesia plan.  Questions answered.   ASA Score: 2     Day of Surgery Review of History & Physical:            Ready For Surgery From Anesthesia Perspective.

## 2019-11-06 NOTE — ANESTHESIA POSTPROCEDURE EVALUATION
Anesthesia Post Evaluation    Patient: Joan Rizvi    Procedure(s) Performed: Procedure(s) (LRB):  ULTRASOUND, UPPER GI TRACT, ENDOSCOPIC (N/A)    Final Anesthesia Type: general  Patient location during evaluation: [facility]  Patient participation: Yes- Able to Participate  Level of consciousness: awake and alert and oriented  Post-procedure vital signs: reviewed and stable  Pain management: adequate  Airway patency: patent  PONV status at discharge: No PONV  Anesthetic complications: no      Cardiovascular status: blood pressure returned to baseline and hemodynamically stable  Respiratory status: unassisted, room air and spontaneous ventilation  Hydration status: euvolemic  Follow-up not needed.          Vitals Value Taken Time   /64 11/6/2019  2:31 PM   Temp 37.1 °C (98.8 °F) 11/6/2019  1:19 PM   Pulse 85 11/6/2019  2:45 PM   Resp 28 11/6/2019  2:45 PM   SpO2 99 % 11/6/2019  2:45 PM   Vitals shown include unvalidated device data.      No case tracking events are documented in the log.      Pain/Larisa Score: Larisa Score: 9 (11/6/2019  2:15 PM)

## 2019-11-06 NOTE — H&P
Short Stay Endoscopy History and Physical    PCP - Zeynep Salgado MD  Referring Physician - Kristian Wakefield MD  [address] [place] [address]    Procedure - colonoscopy  ASA - per anesthesia  Mallampati - per anesthesia  History of Anesthesia problems - no  Family history Anesthesia problems -  no   Plan of anesthesia - General    HPI:  This is a 49 y.o. female here for evaluation of: abnormal appearing pancreas    Reflux - no  Dysphagia - no  Abdominal pain - no  Diarrhea - no    ROS:  Constitutional: No fevers, chills, No weight loss  CV: No chest pain  Pulm: No cough, No shortness of breath  Ophtho: No vision changes  GI: see HPI  Derm: No rash    Medical History:  has a past medical history of GERD (gastroesophageal reflux disease), Hypertension, and Mental disorder.    Surgical History:  has a past surgical history that includes  section; Endometrial ablation; upper gi; Endoscopic ultrasound of upper gastrointestinal tract (N/A, 2019); Esophagogastroduodenoscopy (N/A, 2019); Laparoscopic cholecystectomy (N/A, 2019); and Tubal ligation (1994).    Family History: family history includes Alcohol abuse in her father and mother; Aneurysm in her mother; Coronary artery disease in her paternal grandmother; Dementia in her paternal grandfather and paternal grandmother; Diabetes in her maternal aunt and maternal uncle; Heart disease in her maternal uncle; Heart failure in her mother; Hypertension in her father; Liver disease in her father; No Known Problems in her sister and sister; Stroke in her mother..    Social History:  reports that she has quit smoking. She has never used smokeless tobacco. She reports that she drinks alcohol. She reports that she does not use drugs.    Review of patient's allergies indicates:   Allergen Reactions    Hydrochlorothiazide Other (See Comments)     CAUSING PANCREATITIS        Medications:   Medications Prior to Admission   Medication  Sig Dispense Refill Last Dose    amLODIPine (NORVASC) 10 MG tablet Take 1 tablet (10 mg total) by mouth once daily. 90 tablet 3 11/6/2019 at Unknown time    pantoprazole (PROTONIX) 40 MG tablet Take 1 tablet (40 mg total) by mouth once daily. 90 tablet 3 11/6/2019 at Unknown time       Physical Exam:    Vital Signs:   Vitals:    11/06/19 1319   BP: 139/65   Pulse: 79   Resp: 17   Temp: 98.8 °F (37.1 °C)       General Appearance: Well appearing in no acute distress    Labs:  Lab Results   Component Value Date    WBC 6.99 05/22/2019    HGB 12.9 05/22/2019    HCT 38.6 05/22/2019     05/22/2019    ALT 19 08/23/2019    AST 19 08/23/2019     08/23/2019    K 4.3 08/23/2019     08/23/2019    CREATININE 0.8 08/23/2019    BUN 6 08/23/2019    CO2 26 08/23/2019       I have explained the risks and benefits of this endoscopic procedure to the patient including but not limited to bleeding, inflammation, infection, perforation, and death.      Po Noonan MD

## 2019-11-06 NOTE — [employer] PATIENT INSTRUCTIONS
Discharge Summary/Instructions after an Endoscopic Procedure  Patient Name: Joan Rizvi  Patient MRN: [id number]  Patient YOB: 1970 Wednesday, November 06, 2019  Po Noonan MD  RESTRICTIONS:  During your procedure today, you received medications for sedation.  These   medications may affect your judgment, balance and coordination.  Therefore,   for 24 hours, you have the following restrictions:   - DO NOT drive a car, operate machinery, make legal/financial decisions,   sign important papers or drink alcohol.    ACTIVITY:  Today: no heavy lifting, straining or running due to procedural   sedation/anesthesia.  The following day: return to full activity including work.  DIET:  Eat and drink normally unless instructed otherwise.     TREATMENT FOR COMMON SIDE EFFECTS:  - Mild abdominal pain, nausea, belching, bloating or excessive gas:  rest,   eat lightly and use a heating pad.  - Sore Throat: treat with throat lozenges and/or gargle with warm salt   water.  - Because air was used during the procedure, expelling large amounts of air   from your rectum or belching is normal.  - If a bowel prep was taken, you may not have a bowel movement for 1-3 days.    This is normal.  SYMPTOMS TO WATCH FOR AND REPORT TO YOUR PHYSICIAN:  1. Abdominal pain or bloating, other than gas cramps.  2. Chest pain.  3. Back pain.  4. Signs of infection such as: chills or fever occurring within 24 hours   after the procedure.  5. Rectal bleeding, which would show as bright red, maroon, or black stools.   (A tablespoon of blood from the rectum is not serious, especially if   hemorrhoids are present.)  6. Vomiting.  7. Weakness or dizziness.  GO DIRECTLY TO THE NEAREST EMERGENCY ROOM IF YOU HAVE ANY OF THE FOLLOWING:      Difficulty breathing              Chills and/or fever over 101 F   Persistent vomiting and/or vomiting blood   Severe abdominal pain   Severe chest pain   Black, tarry stools   Bleeding- more than one  tablespoon   Any other symptom or condition that you feel may need urgent attention  Your doctor recommends these additional instructions:  If any biopsies were taken, your doctors clinic will contact you in 1 to 2   weeks with any results.  - Discharge patient to home.   - Resume previous diet.   - Continue present medications.   - Return to primary care physician as previously scheduled.  For questions, problems or results please call your physician - Po Noonan MD at Work:  [phone].  [facility], EMERGENCY ROOM PHONE NUMBER: [phone]  IF A COMPLICATION OR EMERGENCY SITUATION ARISES AND YOU ARE UNABLE TO REACH   YOUR PHYSICIAN - GO DIRECTLY TO THE EMERGENCY ROOM.  Po Noonan MD  11/6/2019 2:13:22 PM  This report has been verified and signed electronically.  [employer]

## 2019-11-06 NOTE — PLAN OF CARE
Pt tolerated procedure well.  Discharge paperwork printed and reviewed with pt.  Copies of discharge paperwork printed and given to patient.  No complaints of pain or nausea.  Pt tolerating PO liquids well.  VSS.  Safety maintained throughout care.

## 2020-07-08 ENCOUNTER — TELEPHONE (OUTPATIENT)
Dept: ENDOSCOPY | Facility: HOSPITAL | Age: 50
End: 2020-07-08

## 2020-07-08 NOTE — TELEPHONE ENCOUNTER
----- Message from Hernan Rosas sent at 7/8/2020  4:48 PM CDT -----  Contact: pt  Pt is calling to get a refill on Sucralfate         Please contact pt [phone]

## 2020-07-09 RX ORDER — SUCRALFATE 1 G/10ML
1 SUSPENSION ORAL 2 TIMES DAILY
Qty: 600 ML | Refills: 2 | Status: SHIPPED | OUTPATIENT
Start: 2020-07-09 | End: 2020-07-09

## 2020-07-09 RX ORDER — SUCRALFATE 1 G/1
1 TABLET ORAL 4 TIMES DAILY
Qty: 60 TABLET | Refills: 2 | Status: SHIPPED | OUTPATIENT
Start: 2020-07-09 | End: 2020-07-16

## 2020-07-09 NOTE — TELEPHONE ENCOUNTER
----- Message from Kathleen Lawson sent at 7/9/2020  8:32 AM CDT -----  Regarding: Refill Request Follow up & Update  Contact: PT  PT called following up on refill requeste sumbitted on yesterday: Sucralfate     PT would also like to add another refill request: Tramadol    neither of these medications are listed on PT's chart.       [facility]/pharmacy #[id number] - [place], [place] - [address] [facility]  [address] [place] [id number]  Phone: [phone] Fax: [phone]        Callback: [phone]

## 2020-07-14 ENCOUNTER — OFFICE VISIT (OUTPATIENT)
Dept: GASTROENTEROLOGY | Facility: CLINIC | Age: 50
End: 2020-07-14
Payer: COMMERCIAL

## 2020-07-14 ENCOUNTER — HOSPITAL ENCOUNTER (OUTPATIENT)
Dept: RADIOLOGY | Facility: HOSPITAL | Age: 50
Discharge: HOME | End: 2020-07-14
Attending: FAMILY MEDICINE
Payer: COMMERCIAL

## 2020-07-14 VITALS — BODY MASS INDEX: 27.45 KG/M2 | WEIGHT: 155 LBS

## 2020-07-14 VITALS
DIASTOLIC BLOOD PRESSURE: 82 MMHG | BODY MASS INDEX: 29.88 KG/M2 | SYSTOLIC BLOOD PRESSURE: 124 MMHG | HEIGHT: 63 IN | HEART RATE: 83 BPM | WEIGHT: 168.63 LBS

## 2020-07-14 DIAGNOSIS — R10.13 EPIGASTRIC ABDOMINAL PAIN: Primary | ICD-10-CM

## 2020-07-14 DIAGNOSIS — Z12.31 SCREENING MAMMOGRAM, ENCOUNTER FOR: ICD-10-CM

## 2020-07-14 DIAGNOSIS — Z12.11 COLON CANCER SCREENING: ICD-10-CM

## 2020-07-14 PROCEDURE — 3008F PR BODY MASS INDEX (BMI) DOCUMENTED: ICD-10-PCS | Mod: CPTII,S$GLB,, | Performed by: INTERNAL MEDICINE

## 2020-07-14 PROCEDURE — 77063 MAMMO DIGITAL SCREENING BILAT WITH TOMOSYNTHESIS_CAD: ICD-10-PCS | Mod: 26,,, | Performed by: RADIOLOGY

## 2020-07-14 PROCEDURE — 3079F PR MOST RECENT DIASTOLIC BLOOD PRESSURE 80-89 MM HG: ICD-10-PCS | Mod: CPTII,S$GLB,, | Performed by: INTERNAL MEDICINE

## 2020-07-14 PROCEDURE — 3074F PR MOST RECENT SYSTOLIC BLOOD PRESSURE < 130 MM HG: ICD-10-PCS | Mod: CPTII,S$GLB,, | Performed by: INTERNAL MEDICINE

## 2020-07-14 PROCEDURE — 77067 SCR MAMMO BI INCL CAD: CPT | Mod: 26,,, | Performed by: RADIOLOGY

## 2020-07-14 PROCEDURE — 99999 PR PBB SHADOW E&M-EST. PATIENT-LVL III: ICD-10-PCS | Mod: PBBFAC,,, | Performed by: INTERNAL MEDICINE

## 2020-07-14 PROCEDURE — 77063 BREAST TOMOSYNTHESIS BI: CPT | Mod: 26,,, | Performed by: RADIOLOGY

## 2020-07-14 PROCEDURE — 77067 MAMMO DIGITAL SCREENING BILAT WITH TOMOSYNTHESIS_CAD: ICD-10-PCS | Mod: 26,,, | Performed by: RADIOLOGY

## 2020-07-14 PROCEDURE — 3079F DIAST BP 80-89 MM HG: CPT | Mod: CPTII,S$GLB,, | Performed by: INTERNAL MEDICINE

## 2020-07-14 PROCEDURE — 3008F BODY MASS INDEX DOCD: CPT | Mod: CPTII,S$GLB,, | Performed by: INTERNAL MEDICINE

## 2020-07-14 PROCEDURE — 99214 OFFICE O/P EST MOD 30 MIN: CPT | Mod: S$GLB,,, | Performed by: INTERNAL MEDICINE

## 2020-07-14 PROCEDURE — 3074F SYST BP LT 130 MM HG: CPT | Mod: CPTII,S$GLB,, | Performed by: INTERNAL MEDICINE

## 2020-07-14 PROCEDURE — 99214 PR OFFICE/OUTPT VISIT, EST, LEVL IV, 30-39 MIN: ICD-10-PCS | Mod: S$GLB,,, | Performed by: INTERNAL MEDICINE

## 2020-07-14 PROCEDURE — 77067 SCR MAMMO BI INCL CAD: CPT | Mod: TC,PN

## 2020-07-14 PROCEDURE — 99999 PR PBB SHADOW E&M-EST. PATIENT-LVL III: CPT | Mod: PBBFAC,,, | Performed by: INTERNAL MEDICINE

## 2020-07-14 RX ORDER — DICYCLOMINE HYDROCHLORIDE 10 MG/1
10 CAPSULE ORAL 3 TIMES DAILY PRN
Qty: 90 CAPSULE | Refills: 0 | Status: SHIPPED | OUTPATIENT
Start: 2020-07-14 | End: 2020-08-05

## 2020-07-14 NOTE — PROGRESS NOTES
Reason for visit:  Epigastric pain.     HPI:  Ms. Rizvi is a 48-year-old whose been complaining of epigastric abdominal pain for the past 2 weeks.  This has been a continuous pain and discomfort which has gradually improved.  She was recently prescribed Carafate tablet which she has been taking 4 times a day.  She typically has heartburn issues for which she takes pantoprazole daily.   She denies taking any NSAIDs on a regular basis, however has been taking ibuprofen on and off for the abdominal pain.  Pain does seem to go up into the right side to her back as well.  She had previously undergone a cholecystectomy about a year ago for gallbladder colic.  She has been under lot of stress since passing of her  colon cancer.   She denies any changes in her bowel pattern.  No weight loss fevers or chills.    Past medical, surgical, social and family history reviewed in epic     Medication allergies reviewed in epic.     Review of systems:  Constitutional:  No fevers no chills no unintentional weight loss, appetite is stable, complains of some malaise and fatigue over the past 3 days  Eyes:  No visual changes or red eyes  ENT:  No odynophagia or hoarseness of voice  Cardiovascular:  No angina or palpitation, no exertional dyspnea  Respiratory:  No shortness of breath or wheezing  Genitourinary:  No dysuria frequency or hematuria  Musculoskeletal:  Complaining of some arthralgias her hands, no myalgias  Skin:  No pruritus or eczema  Neurologic:  Occasional headaches but no seizures  Gastrointestinal:  See HPI     Physical exam:  Vitals see epic, awake alert oriented x3, appears anxious  Neck:  Supple, no carotid bruits heard  Abdomen:  Soft, mild tenderness to deep palpation in the epigastrium, no guarding or rigidity, nondistended, no masses palpable, no hepatosplenomegaly appreciated, bowel sounds are normal with no abdominal bruits heard  Eyes:  Conjunctivae anicteric, not injected  ENT:  Oral mucosa  moist  Cardiovascular:  S1, S2 normal , possible flow murmur in the aortic area, no gallops.  Tenderness to palpation along bilateral lower rib cage  Respiratory:  Bilateral air entry equal with no rhonchi or crackles  Skin:  No palmar erythema or spider angiomata  Neurologic:  No asterixis or tremors  Psychiatric:  Anxious  Lower extremities:  No pedal edema     Impression:  Recent onset of epigastric abdominal pain.     Recommendation:  1. Proceed with CBC, CMP and lipase  2. Schedule ultrasound of the abdomen.  3.  Carafate p.o. q.i.d. for 1 month  4.  May consider Bentyl p.o. p.r.n. for abdominal pain if no improvement.  5.  Schedule colonoscopy in September when she would be due for colon cancer screening.  Will had endoscopy at that time if symptoms persist.

## 2020-07-15 ENCOUNTER — TELEPHONE (OUTPATIENT)
Dept: GASTROENTEROLOGY | Facility: CLINIC | Age: 50
End: 2020-07-15

## 2020-07-15 NOTE — TELEPHONE ENCOUNTER
Patient states her questions were answered. Notified patient of Dr Walker message that labs look fine. Patient verbalized understanding.

## 2020-07-15 NOTE — TELEPHONE ENCOUNTER
----- Message from Maryam Gordillo MA sent at 7/14/2020  5:04 PM CDT -----  Contact: pt    ----- Message -----  From: Hernan Rosas  Sent: 7/14/2020   4:53 PM CDT  To: Aaron RAMIREZ Staff    Pt would like to receive a call from staff, has questions          Please contact pt: [phone]

## 2020-07-20 ENCOUNTER — HOSPITAL ENCOUNTER (OUTPATIENT)
Dept: RADIOLOGY | Facility: HOSPITAL | Age: 50
Discharge: HOME | End: 2020-07-20
Attending: INTERNAL MEDICINE
Payer: COMMERCIAL

## 2020-07-20 DIAGNOSIS — R10.13 EPIGASTRIC ABDOMINAL PAIN: ICD-10-CM

## 2020-07-20 PROCEDURE — 76700 US EXAM ABDOM COMPLETE: CPT | Mod: TC

## 2020-07-20 PROCEDURE — 76700 US EXAM ABDOM COMPLETE: CPT | Mod: 26,,, | Performed by: RADIOLOGY

## 2020-07-20 PROCEDURE — 76700 US ABDOMEN COMPLETE: ICD-10-PCS | Mod: 26,,, | Performed by: RADIOLOGY

## 2020-07-21 ENCOUNTER — TELEPHONE (OUTPATIENT)
Dept: GASTROENTEROLOGY | Facility: CLINIC | Age: 50
End: 2020-07-21

## 2020-07-21 NOTE — TELEPHONE ENCOUNTER
Spoke with patient , results given as written by Dr. Walker  Pt verbalizes understadning and appreciates the call.  Thanks MA

## 2020-09-03 DIAGNOSIS — Z01.818 PRE-OP TESTING: ICD-10-CM

## 2020-09-03 DIAGNOSIS — Z12.11 SPECIAL SCREENING FOR MALIGNANT NEOPLASMS, COLON: Primary | ICD-10-CM

## 2020-09-03 RX ORDER — POLYETHYLENE GLYCOL 3350, SODIUM SULFATE ANHYDROUS, SODIUM BICARBONATE, SODIUM CHLORIDE, POTASSIUM CHLORIDE 236; 22.74; 6.74; 5.86; 2.97 G/4L; G/4L; G/4L; G/4L; G/4L
4 POWDER, FOR SOLUTION ORAL ONCE
Qty: 4000 ML | Refills: 0 | Status: SHIPPED | OUTPATIENT
Start: 2020-09-03 | End: 2020-09-03

## 2020-10-02 ENCOUNTER — TELEPHONE (OUTPATIENT)
Dept: GASTROENTEROLOGY | Facility: CLINIC | Age: 50
End: 2020-10-02

## 2020-10-02 NOTE — TELEPHONE ENCOUNTER
----- Message from Drea Patricio sent at 10/2/2020  9:42 AM CDT -----  Calling to give approval for pt egd scope states procure  code [id number] authorize from 10/09/2020 to 11/07/2020 authorization number [id number]

## 2020-10-08 ENCOUNTER — TELEPHONE (OUTPATIENT)
Dept: ENDOSCOPY | Facility: HOSPITAL | Age: 50
End: 2020-10-08

## 2020-10-08 ENCOUNTER — PATIENT MESSAGE (OUTPATIENT)
Dept: PRIMARY CARE CLINIC | Facility: CLINIC | Age: 50
End: 2020-10-08

## 2020-10-08 NOTE — TELEPHONE ENCOUNTER
Called pt. To remove from schedule due to not getting Covid test done. Left Voicemail message to return call.EC

## 2020-11-24 ENCOUNTER — LAB VISIT (OUTPATIENT)
Dept: LAB | Facility: HOSPITAL | Age: 50
End: 2020-11-24
Attending: NURSE PRACTITIONER
Payer: COMMERCIAL

## 2020-11-24 ENCOUNTER — OFFICE VISIT (OUTPATIENT)
Dept: OBSTETRICS AND GYNECOLOGY | Facility: CLINIC | Age: 50
End: 2020-11-24
Payer: COMMERCIAL

## 2020-11-24 VITALS
SYSTOLIC BLOOD PRESSURE: 138 MMHG | BODY MASS INDEX: 29.91 KG/M2 | DIASTOLIC BLOOD PRESSURE: 80 MMHG | WEIGHT: 168.88 LBS

## 2020-11-24 DIAGNOSIS — Z11.3 SCREEN FOR STD (SEXUALLY TRANSMITTED DISEASE): ICD-10-CM

## 2020-11-24 DIAGNOSIS — Z12.4 SCREENING FOR MALIGNANT NEOPLASM OF CERVIX: ICD-10-CM

## 2020-11-24 DIAGNOSIS — Z01.419 ENCOUNTER FOR ANNUAL ROUTINE GYNECOLOGICAL EXAMINATION: Primary | ICD-10-CM

## 2020-11-24 DIAGNOSIS — N89.8 VAGINAL DISCHARGE: ICD-10-CM

## 2020-11-24 PROCEDURE — 86703 HIV-1/HIV-2 1 RESULT ANTBDY: CPT

## 2020-11-24 PROCEDURE — 99999 PR PBB SHADOW E&M-EST. PATIENT-LVL III: CPT | Mod: PBBFAC,,, | Performed by: NURSE PRACTITIONER

## 2020-11-24 PROCEDURE — 3075F SYST BP GE 130 - 139MM HG: CPT | Mod: CPTII,S$GLB,, | Performed by: NURSE PRACTITIONER

## 2020-11-24 PROCEDURE — 3079F DIAST BP 80-89 MM HG: CPT | Mod: CPTII,S$GLB,, | Performed by: NURSE PRACTITIONER

## 2020-11-24 PROCEDURE — 88175 CYTOPATH C/V AUTO FLUID REDO: CPT

## 2020-11-24 PROCEDURE — 3075F PR MOST RECENT SYSTOLIC BLOOD PRESS GE 130-139MM HG: ICD-10-PCS | Mod: CPTII,S$GLB,, | Performed by: NURSE PRACTITIONER

## 2020-11-24 PROCEDURE — 80074 ACUTE HEPATITIS PANEL: CPT

## 2020-11-24 PROCEDURE — 87624 HPV HI-RISK TYP POOLED RSLT: CPT

## 2020-11-24 PROCEDURE — 99396 PREV VISIT EST AGE 40-64: CPT | Mod: S$GLB,,, | Performed by: NURSE PRACTITIONER

## 2020-11-24 PROCEDURE — 1126F PR PAIN SEVERITY QUANTIFIED, NO PAIN PRESENT: ICD-10-PCS | Mod: S$GLB,,, | Performed by: NURSE PRACTITIONER

## 2020-11-24 PROCEDURE — 3008F BODY MASS INDEX DOCD: CPT | Mod: CPTII,S$GLB,, | Performed by: NURSE PRACTITIONER

## 2020-11-24 PROCEDURE — 36415 COLL VENOUS BLD VENIPUNCTURE: CPT | Mod: PN

## 2020-11-24 PROCEDURE — 86592 SYPHILIS TEST NON-TREP QUAL: CPT

## 2020-11-24 PROCEDURE — 3008F PR BODY MASS INDEX (BMI) DOCUMENTED: ICD-10-PCS | Mod: CPTII,S$GLB,, | Performed by: NURSE PRACTITIONER

## 2020-11-24 PROCEDURE — 1126F AMNT PAIN NOTED NONE PRSNT: CPT | Mod: S$GLB,,, | Performed by: NURSE PRACTITIONER

## 2020-11-24 PROCEDURE — 3079F PR MOST RECENT DIASTOLIC BLOOD PRESSURE 80-89 MM HG: ICD-10-PCS | Mod: CPTII,S$GLB,, | Performed by: NURSE PRACTITIONER

## 2020-11-24 PROCEDURE — 99999 PR PBB SHADOW E&M-EST. PATIENT-LVL III: ICD-10-PCS | Mod: PBBFAC,,, | Performed by: NURSE PRACTITIONER

## 2020-11-24 PROCEDURE — 99396 PR PREVENTIVE VISIT,EST,40-64: ICD-10-PCS | Mod: S$GLB,,, | Performed by: NURSE PRACTITIONER

## 2020-11-24 RX ORDER — FLUCONAZOLE 150 MG/1
150 TABLET ORAL ONCE
Qty: 2 TABLET | Refills: 1 | Status: SHIPPED | OUTPATIENT
Start: 2020-11-24 | End: 2020-11-24

## 2020-11-24 NOTE — PROGRESS NOTES
CC: Well woman exam    SUBJECTIVE:   Joan Rizvi is a 50 y.o. female   for annual routine Pap and checkup. Patient's last menstrual period was 2020..  She complains of white vaginal discharge that she noticed about 5 days ago. She reports that it occurred right before her cycle but has since improved. She denies vaginal odor or itching/irritation.    She also complains of recurrent sores at the corners of her mouth. She thinks they are related to oral yeast infections.     She is currently sexually active with one male partner. Requests STD testing- full panel.     She reports that she is still getting monthly menstrual cycles. Occasionally she will skip a month but mostly they are regular and light, lasting about 3 days.        History of abnormal pap: Yes - 7-8 years ago, pt cannot recall the abnormality  Last Pap: 2017- NILM  Last MM2020- Birads 1, [place]: 5.44 %.   Last Colonoscopy:  No    Pt reports that she had colonoscopy scheduled but she had to cancel it due to personal reasons. She reports that she is planning to call and schedule it soon.     Past Medical History:   Diagnosis Date    GERD (gastroesophageal reflux disease)     Hypertension     Mental disorder      Past Surgical History:   Procedure Laterality Date     SECTION      ENDOMETRIAL ABLATION      ENDOSCOPIC ULTRASOUND OF UPPER GASTROINTESTINAL TRACT N/A 2019    Procedure: ULTRASOUND, UPPER GI TRACT, ENDOSCOPIC;  Surgeon: Kristian Wakefield MD;  Location: [address]);  Service: Endoscopy;  Laterality: N/A;  To be done end of  along with EGD evaluation to r/o PUD.    ENDOSCOPIC ULTRASOUND OF UPPER GASTROINTESTINAL TRACT N/A 2019    Procedure: ULTRASOUND, UPPER GI TRACT, ENDOSCOPIC;  Surgeon: Po Noonan MD;  Location: [facility] ([address] [place] [address]);  Service: Endoscopy;  Laterality: N/A;  CT still shows a spot in the pancreas. It has not changed, and likely just represents an area of  inflammation, but I think we should take another look with EUS.  Dr SHERLEY Wakefield  PM prep - pg  10/31/19 appt confirmed-rb    ESOPHAGOGASTRODUODENOSCOPY N/A 6/24/2019    Procedure: EGD (ESOPHAGOGASTRODUODENOSCOPY);  Surgeon: Kristian Wakefield MD;  Location: [facility] ([address] [place] [address]);  Service: Endoscopy;  Laterality: N/A;    LAPAROSCOPIC CHOLECYSTECTOMY N/A 7/24/2019    Procedure: CHOLECYSTECTOMY, LAPAROSCOPIC;  Surgeon: Huber Neri MD;  Location: [facility] OR [address] [place] [address];  Service: General;  Laterality: N/A;    TUBAL LIGATION  04/1994    upper gi       Social History     Socioeconomic History    Marital status:      Spouse name: Not on file    Number of children: Not on file    Years of education: Not on file    Highest education level: Not on file   Occupational History    Occupation: manager   Social Needs    Financial resource strain: Somewhat hard    Food insecurity     Worry: Never true     Inability: Never true    Transportation needs     Medical: No     Non-medical: No   Tobacco Use    Smoking status: Former Smoker    Smokeless tobacco: Never Used    Tobacco comment: 2017   Substance and Sexual Activity    Alcohol use: Yes     Frequency: 2-4 times a month     Drinks per session: 1 or 2     Binge frequency: Never     Comment: social     Drug use: No    Sexual activity: Yes     Partners: Male     Birth control/protection: See Surgical Hx     Comment: tubal ligation in 1994   Lifestyle    Physical activity     Days per week: 3 days     Minutes per session: 20 min    Stress: To some extent   Relationships    Social connections     Talks on phone: Three times a week     Gets together: Once a week     Attends Jew service: More than 4 times per year     Active member of club or organization: No     Attends meetings of clubs or organizations: Never     Relationship status:    Other Topics Concern    Not on file   Social History Narrative    ; 3 children     Family History   Problem  Relation Age of Onset    Heart failure Mother     Stroke Mother     Aneurysm Mother     Alcohol abuse Mother     Diabetes Maternal Aunt     Diabetes Maternal Uncle     Heart disease Maternal Uncle     Hypertension Father     Alcohol abuse Father     Liver disease Father     No Known Problems Sister     Coronary artery disease Paternal Grandmother     Dementia Paternal Grandmother     Dementia Paternal Grandfather     No Known Problems Sister     Colon cancer Neg Hx     Esophageal cancer Neg Hx     Ovarian cancer Neg Hx     Breast cancer Neg Hx      OB History        4    Para   3    Term   3       0    AB   1    Living           SAB        TAB        Ectopic        Multiple        Live Births                     /80   Wt 76.6 kg (168 lb 14 oz)   LMP 2020   BMI 29.91 kg/m²     ROS:  GENERAL: Denies weight gain or weight loss. Feeling well overall.   SKIN: Denies rash or lesions.   HEAD: Denies head injury or headache.   NODES: Denies enlarged lymph nodes.   CHEST: Denies chest pain or shortness of breath.   CARDIOVASCULAR: Denies palpitations or left sided chest pain.   ABDOMEN: No abdominal pain, constipation, diarrhea, nausea, vomiting or rectal bleeding.   URINARY: No frequency, dysuria, hematuria, or burning on urination.  REPRODUCTIVE: See HPI.   BREASTS: The patient performs breast self-examination and denies pain, lumps, or nipple discharge.   HEMATOLOGIC: No easy bruisability or excessive bleeding.   MUSCULOSKELETAL: Denies joint pain or swelling.   NEUROLOGIC: Denies syncope or weakness.   PSYCHIATRIC: Denies depression, anxiety or mood swings.    PHYSICAL EXAM:  APPEARANCE: Well nourished, well developed, in no acute distress.  AFFECT: WNL, alert and oriented x 3  SKIN: No acne or hirsutism  NECK: Neck symmetric without masses or thyromegaly  NODES: No inguinal, cervical, axillary, or femoral lymph node enlargement  CHEST: Good respiratory effect  ABDOMEN:  Soft.  No tenderness or masses.  No hepatosplenomegaly.  No hernias.  BREASTS: Symmetrical, no skin changes or visible lesions.  No palpable masses, nipple discharge bilaterally.  PELVIC: Normal external genitalia without lesions.  Normal hair distribution.  Adequate perineal body, normal urethral meatus.  Vagina mildly atrophic without lesions or discharge.  Cervix pink, without lesions, discharge or tenderness.  No significant cystocele or rectocele.  Bimanual exam shows uterus to be normal size, regular, mobile and nontender.  Adnexa without masses or tenderness.    RECTAL: Rectovaginal exam confirms above with normal sphincter tone, no masses.  EXTREMITIES: No edema.    ASSESSMENT AND PLAN:    ICD-10-CM ICD-9-CM    1. Encounter for annual routine gynecological examination  Z01.419 V72.31    2. Vaginal discharge  N89.8 623.5 fluconazole (DIFLUCAN) 150 MG Tab   3. Screen for STD (sexually transmitted disease)  Z11.3 V74.5 C. trachomatis/N. gonorrhoeae by AMP DNA      HIV-1 and HIV-2 antibodies      RPR      Hepatitis panel, acute      Vaginosis Screen by DNA Probe   4. Screening for malignant neoplasm of cervix  Z12.4 V76.2 Liquid-Based Pap Smear, Screening      HPV High Risk Genotypes, PCR           Patient was counseled today on A.C.S. Pap guidelines and recommendations for yearly pelvic exams, mammograms and monthly self breast exams. Advised pt to see her PCP for other health maintenance.     Vaginal exam WNL; due to pt's symptoms of oral yeast infections as well as vaginal discharge will send diflucan. Advised pt to f/u with PCP/dentist in regards to oral infections.     Well Woman:   - Pap smear: Pap with HPV co-testing today. If NILM HPV - repeat in 5 years per current ASCCP guidelines.   - Mammogram: up to date  - Colonoscopy: Due; pt will schedule  - Dexa: due age 65  - Immunizations: with PCP  - Labs: with PCP  - Exercise counseling provided    Follow-up 1 year or PRN      JORGE Bolanos

## 2020-11-25 LAB
HAV IGM SERPL QL IA: NEGATIVE
HBV CORE IGM SERPL QL IA: NEGATIVE
HBV SURFACE AG SERPL QL IA: NEGATIVE
HCV AB SERPL QL IA: NEGATIVE
HIV 1+2 AB+HIV1 P24 AG SERPL QL IA: NEGATIVE
RPR SER QL: NORMAL

## 2020-11-26 LAB
CANDIDA RRNA VAG QL PROBE: NEGATIVE
G VAGINALIS RRNA GENITAL QL PROBE: NEGATIVE
T VAGINALIS RRNA GENITAL QL PROBE: POSITIVE

## 2020-11-27 LAB
C TRACH RRNA SPEC QL NAA+PROBE: NEGATIVE
N GONORRHOEA RRNA SPEC QL NAA+PROBE: NEGATIVE

## 2020-11-29 ENCOUNTER — PATIENT MESSAGE (OUTPATIENT)
Dept: OBSTETRICS AND GYNECOLOGY | Facility: CLINIC | Age: 50
End: 2020-11-29

## 2020-11-30 ENCOUNTER — TELEPHONE (OUTPATIENT)
Dept: OBSTETRICS AND GYNECOLOGY | Facility: CLINIC | Age: 50
End: 2020-11-30

## 2020-11-30 ENCOUNTER — PATIENT MESSAGE (OUTPATIENT)
Dept: OBSTETRICS AND GYNECOLOGY | Facility: CLINIC | Age: 50
End: 2020-11-30

## 2020-11-30 RX ORDER — METRONIDAZOLE 500 MG/1
TABLET ORAL
Qty: 4 TABLET | Refills: 1 | Status: SHIPPED | OUTPATIENT
Start: 2020-11-30 | End: 2020-12-04

## 2020-11-30 NOTE — TELEPHONE ENCOUNTER
----- Message from Briseida Voss NP sent at 11/30/2020  5:18 PM CST -----  Please call pt to schedule a HODAN in 6 weeks. Thanks so much!

## 2020-12-02 LAB
HPV HR 12 DNA SPEC QL NAA+PROBE: NEGATIVE
HPV16 AG SPEC QL: NEGATIVE
HPV18 DNA SPEC QL NAA+PROBE: NEGATIVE

## 2020-12-04 ENCOUNTER — LAB VISIT (OUTPATIENT)
Dept: LAB | Facility: HOSPITAL | Age: 50
End: 2020-12-04
Attending: FAMILY MEDICINE
Payer: COMMERCIAL

## 2020-12-04 ENCOUNTER — OFFICE VISIT (OUTPATIENT)
Dept: PRIMARY CARE CLINIC | Facility: CLINIC | Age: 50
End: 2020-12-04
Payer: COMMERCIAL

## 2020-12-04 VITALS
BODY MASS INDEX: 29.72 KG/M2 | HEIGHT: 63 IN | TEMPERATURE: 99 F | WEIGHT: 167.75 LBS | OXYGEN SATURATION: 99 % | HEART RATE: 84 BPM | SYSTOLIC BLOOD PRESSURE: 128 MMHG | DIASTOLIC BLOOD PRESSURE: 72 MMHG

## 2020-12-04 DIAGNOSIS — Z12.11 ENCOUNTER FOR SCREENING COLONOSCOPY: ICD-10-CM

## 2020-12-04 DIAGNOSIS — F41.9 ANXIETY: ICD-10-CM

## 2020-12-04 DIAGNOSIS — E78.5 HYPERLIPIDEMIA ASSOCIATED WITH TYPE 2 DIABETES MELLITUS: ICD-10-CM

## 2020-12-04 DIAGNOSIS — N95.1 PERIMENOPAUSAL SYMPTOMS: ICD-10-CM

## 2020-12-04 DIAGNOSIS — E11.69 HYPERLIPIDEMIA ASSOCIATED WITH TYPE 2 DIABETES MELLITUS: ICD-10-CM

## 2020-12-04 DIAGNOSIS — I10 ESSENTIAL HYPERTENSION: ICD-10-CM

## 2020-12-04 DIAGNOSIS — Z87.19 HISTORY OF PANCREATITIS: ICD-10-CM

## 2020-12-04 DIAGNOSIS — K21.9 GASTROESOPHAGEAL REFLUX DISEASE WITHOUT ESOPHAGITIS: ICD-10-CM

## 2020-12-04 DIAGNOSIS — E11.9 TYPE 2 DIABETES MELLITUS WITHOUT COMPLICATION, WITHOUT LONG-TERM CURRENT USE OF INSULIN: ICD-10-CM

## 2020-12-04 DIAGNOSIS — Z00.00 LABORATORY EXAM ORDERED AS PART OF ROUTINE GENERAL MEDICAL EXAMINATION: ICD-10-CM

## 2020-12-04 DIAGNOSIS — E55.9 VITAMIN D DEFICIENCY: ICD-10-CM

## 2020-12-04 DIAGNOSIS — Z00.00 ANNUAL PHYSICAL EXAM: Primary | ICD-10-CM

## 2020-12-04 DIAGNOSIS — J32.0 CHRONIC MAXILLARY SINUSITIS: ICD-10-CM

## 2020-12-04 DIAGNOSIS — F43.21 GRIEF: ICD-10-CM

## 2020-12-04 LAB
25(OH)D3+25(OH)D2 SERPL-MCNC: 8 NG/ML (ref 30–96)
ALBUMIN SERPL BCP-MCNC: 4 G/DL (ref 3.5–5.2)
ALBUMIN/CREAT UR: 9.1 UG/MG (ref 0–30)
ALP SERPL-CCNC: 136 U/L (ref 55–135)
ALT SERPL W/O P-5'-P-CCNC: 34 U/L (ref 10–44)
ANION GAP SERPL CALC-SCNC: 7 MMOL/L (ref 8–16)
AST SERPL-CCNC: 36 U/L (ref 10–40)
BASOPHILS # BLD AUTO: 0.04 K/UL (ref 0–0.2)
BASOPHILS NFR BLD: 0.9 % (ref 0–1.9)
BILIRUB SERPL-MCNC: 0.2 MG/DL (ref 0.1–1)
BUN SERPL-MCNC: 6 MG/DL (ref 6–20)
CALCIUM SERPL-MCNC: 9.5 MG/DL (ref 8.7–10.5)
CHLORIDE SERPL-SCNC: 103 MMOL/L (ref 95–110)
CHOLEST SERPL-MCNC: 225 MG/DL (ref 120–199)
CHOLEST/HDLC SERPL: 2.3 {RATIO} (ref 2–5)
CO2 SERPL-SCNC: 28 MMOL/L (ref 23–29)
CREAT SERPL-MCNC: 0.7 MG/DL (ref 0.5–1.4)
CREAT UR-MCNC: 143 MG/DL (ref 15–325)
DIFFERENTIAL METHOD: ABNORMAL
EOSINOPHIL # BLD AUTO: 0.1 K/UL (ref 0–0.5)
EOSINOPHIL NFR BLD: 1.4 % (ref 0–8)
ERYTHROCYTE [DISTWIDTH] IN BLOOD BY AUTOMATED COUNT: 13.2 % (ref 11.5–14.5)
EST. GFR  (AFRICAN AMERICAN): >60 ML/MIN/1.73 M^2
EST. GFR  (NON AFRICAN AMERICAN): >60 ML/MIN/1.73 M^2
GLUCOSE SERPL-MCNC: 167 MG/DL (ref 70–110)
HCT VFR BLD AUTO: 39.4 % (ref 37–48.5)
HDLC SERPL-MCNC: 97 MG/DL (ref 40–75)
HDLC SERPL: 43.1 % (ref 20–50)
HGB BLD-MCNC: 12.6 G/DL (ref 12–16)
IMM GRANULOCYTES # BLD AUTO: 0 K/UL (ref 0–0.04)
IMM GRANULOCYTES NFR BLD AUTO: 0 % (ref 0–0.5)
LDLC SERPL CALC-MCNC: 109.4 MG/DL (ref 63–159)
LYMPHOCYTES # BLD AUTO: 1.9 K/UL (ref 1–4.8)
LYMPHOCYTES NFR BLD: 43.2 % (ref 18–48)
MCH RBC QN AUTO: 32.2 PG (ref 27–31)
MCHC RBC AUTO-ENTMCNC: 32 G/DL (ref 32–36)
MCV RBC AUTO: 101 FL (ref 82–98)
MICROALBUMIN UR DL<=1MG/L-MCNC: 13 UG/ML
MONOCYTES # BLD AUTO: 0.4 K/UL (ref 0.3–1)
MONOCYTES NFR BLD: 8 % (ref 4–15)
NEUTROPHILS # BLD AUTO: 2 K/UL (ref 1.8–7.7)
NEUTROPHILS NFR BLD: 46.5 % (ref 38–73)
NONHDLC SERPL-MCNC: 128 MG/DL
NRBC BLD-RTO: 0 /100 WBC
PLATELET # BLD AUTO: 382 K/UL (ref 150–350)
PMV BLD AUTO: 10.2 FL (ref 9.2–12.9)
POTASSIUM SERPL-SCNC: 4.1 MMOL/L (ref 3.5–5.1)
PROT SERPL-MCNC: 7.6 G/DL (ref 6–8.4)
RBC # BLD AUTO: 3.91 M/UL (ref 4–5.4)
SODIUM SERPL-SCNC: 138 MMOL/L (ref 136–145)
TRIGL SERPL-MCNC: 93 MG/DL (ref 30–150)
TSH SERPL DL<=0.005 MIU/L-ACNC: 0.9 UIU/ML (ref 0.4–4)
WBC # BLD AUTO: 4.37 K/UL (ref 3.9–12.7)

## 2020-12-04 PROCEDURE — 99396 PREV VISIT EST AGE 40-64: CPT | Mod: S$GLB,,, | Performed by: FAMILY MEDICINE

## 2020-12-04 PROCEDURE — 99999 PR PBB SHADOW E&M-EST. PATIENT-LVL IV: ICD-10-PCS | Mod: PBBFAC,,, | Performed by: FAMILY MEDICINE

## 2020-12-04 PROCEDURE — 1126F AMNT PAIN NOTED NONE PRSNT: CPT | Mod: S$GLB,,, | Performed by: FAMILY MEDICINE

## 2020-12-04 PROCEDURE — 3074F SYST BP LT 130 MM HG: CPT | Mod: CPTII,S$GLB,, | Performed by: FAMILY MEDICINE

## 2020-12-04 PROCEDURE — 36415 COLL VENOUS BLD VENIPUNCTURE: CPT | Mod: PN

## 2020-12-04 PROCEDURE — 1126F PR PAIN SEVERITY QUANTIFIED, NO PAIN PRESENT: ICD-10-PCS | Mod: S$GLB,,, | Performed by: FAMILY MEDICINE

## 2020-12-04 PROCEDURE — 3078F PR MOST RECENT DIASTOLIC BLOOD PRESSURE < 80 MM HG: ICD-10-PCS | Mod: CPTII,S$GLB,, | Performed by: FAMILY MEDICINE

## 2020-12-04 PROCEDURE — 99999 PR PBB SHADOW E&M-EST. PATIENT-LVL IV: CPT | Mod: PBBFAC,,, | Performed by: FAMILY MEDICINE

## 2020-12-04 PROCEDURE — 80053 COMPREHEN METABOLIC PANEL: CPT

## 2020-12-04 PROCEDURE — 82306 VITAMIN D 25 HYDROXY: CPT

## 2020-12-04 PROCEDURE — 99396 PR PREVENTIVE VISIT,EST,40-64: ICD-10-PCS | Mod: S$GLB,,, | Performed by: FAMILY MEDICINE

## 2020-12-04 PROCEDURE — 83036 HEMOGLOBIN GLYCOSYLATED A1C: CPT

## 2020-12-04 PROCEDURE — 84443 ASSAY THYROID STIM HORMONE: CPT

## 2020-12-04 PROCEDURE — 85025 COMPLETE CBC W/AUTO DIFF WBC: CPT

## 2020-12-04 PROCEDURE — 3078F DIAST BP <80 MM HG: CPT | Mod: CPTII,S$GLB,, | Performed by: FAMILY MEDICINE

## 2020-12-04 PROCEDURE — 3074F PR MOST RECENT SYSTOLIC BLOOD PRESSURE < 130 MM HG: ICD-10-PCS | Mod: CPTII,S$GLB,, | Performed by: FAMILY MEDICINE

## 2020-12-04 PROCEDURE — 80061 LIPID PANEL: CPT

## 2020-12-04 PROCEDURE — 3008F PR BODY MASS INDEX (BMI) DOCUMENTED: ICD-10-PCS | Mod: CPTII,S$GLB,, | Performed by: FAMILY MEDICINE

## 2020-12-04 PROCEDURE — 3008F BODY MASS INDEX DOCD: CPT | Mod: CPTII,S$GLB,, | Performed by: FAMILY MEDICINE

## 2020-12-04 PROCEDURE — 82043 UR ALBUMIN QUANTITATIVE: CPT

## 2020-12-04 RX ORDER — VENLAFAXINE HYDROCHLORIDE 37.5 MG/1
37.5 CAPSULE, EXTENDED RELEASE ORAL DAILY
Qty: 90 CAPSULE | Refills: 3 | Status: SHIPPED | OUTPATIENT
Start: 2020-12-04 | End: 2021-03-11

## 2020-12-04 RX ORDER — PANTOPRAZOLE SODIUM 40 MG/1
40 TABLET, DELAYED RELEASE ORAL DAILY
Qty: 90 TABLET | Refills: 3 | Status: SHIPPED | OUTPATIENT
Start: 2020-12-04 | End: 2022-03-23

## 2020-12-04 RX ORDER — CLINDAMYCIN HYDROCHLORIDE 300 MG/1
300 CAPSULE ORAL EVERY 8 HOURS
Qty: 30 CAPSULE | Refills: 0 | Status: SHIPPED | OUTPATIENT
Start: 2020-12-04 | End: 2020-12-14

## 2020-12-04 RX ORDER — CETIRIZINE HYDROCHLORIDE 10 MG/1
10 TABLET ORAL DAILY PRN
Qty: 90 TABLET | Refills: 3 | Status: SHIPPED | OUTPATIENT
Start: 2020-12-04 | End: 2021-10-13 | Stop reason: SDUPTHER

## 2020-12-04 RX ORDER — AMLODIPINE BESYLATE 10 MG/1
10 TABLET ORAL DAILY
Qty: 90 TABLET | Refills: 3 | Status: SHIPPED | OUTPATIENT
Start: 2020-12-04 | End: 2021-10-13 | Stop reason: SDUPTHER

## 2020-12-04 RX ORDER — FLUTICASONE PROPIONATE 50 MCG
2 SPRAY, SUSPENSION (ML) NASAL 2 TIMES DAILY PRN
Qty: 16 G | Refills: 11 | Status: SHIPPED | OUTPATIENT
Start: 2020-12-04 | End: 2021-10-13 | Stop reason: SDUPTHER

## 2020-12-04 NOTE — PROGRESS NOTES
"Subjective:       Patient ID: Joan Rizvi is a 50 y.o. female.    Chief Complaint: Annual Exam, Sinus Problem, Night Sweats, and Anxiety      49 yo female with a past medical history of Overweight BMI 29, Hypertension on amlodipine, GERD on Protonix/Carafate, perimenopausal, Hx of pancreatitis (discontinued HCTZ), s/p laparoscopic cholecystectomy for chronic cholecystitis on 7/24/2019.     Last gynecology exam on 11/25/2020 for well woman physical: there is no documentation of her concerns for "night sweats" which upon further description sound like random hot flashes.     Her  passed away in April, 2020. She reports grief and anxiety. She was rescribed Buspar for anxiety in the past but has been off medication for at least 1 year.     She has trouble sleeping as she is always "thinking"; wakes up 3-4 X per night.    In the past she took Clindamycin which resolved her sinus discomfort. She has not been taking antihistamine or nasal spray.      Lipid disorders/ASCVD risk (ages >/= 45 or >/= 20 if increased risk ):  Ordered  DM (>45y yearly or if obese, HTN):  Ordered  Vitamin D screening (only if risk factor present):  Ordered  Breast Cancer (40-50y discretion of pt, 50-74y every 1-2 years):  Ordered  Cervical Cancer (Pap Smear ages 21-65 every 3 years or Pap + HPV q5 years after 30 years of age):  Pending results by gynecology    The following portions of the patient's history were reviewed and updated as appropriate: allergies, current medications, past family history, past medical history, past social history, past surgical history and problem list.      Sinus Problem  This is a new problem. The current episode started 1 to 4 weeks ago. The problem is unchanged. There has been no fever. Her pain is at a severity of 3/10. The pain is mild. Associated symptoms include sinus pressure. Pertinent negatives include no chills, congestion, diaphoresis, headaches, neck pain, shortness of breath or sore throat. " Past treatments include nothing.   Anxiety  Symptoms include nervous/anxious behavior. Patient reports no chest pain, confusion, dizziness, nausea, palpitations, shortness of breath or suicidal ideas.       Abdominal Pain  This is a recurrent problem. The current episode started more than 1 year ago. The onset quality is gradual. The problem occurs every several days. The most recent episode lasted 2 hours. The problem has been waxing and waning. The pain is located in the epigastric region. The pain is at a severity of 4/10. The pain is moderate. The quality of the pain is sharp. Associated symptoms include anorexia, flatus and myalgias. Pertinent negatives include no arthralgias, belching, constipation, diarrhea, dysuria, fever, headaches, hematochezia, hematuria, melena, nausea, vomiting or weight loss. The pain is aggravated by eating. The pain is relieved by nothing. She has tried acetaminophen, antacids, antibiotics, H2 blockers and oral narcotic analgesics for the symptoms. The treatment provided moderate relief. Prior diagnostic workup includes GI consult, ultrasound and upper endoscopy. Her past medical history is significant for gallstones, GERD, irritable bowel syndrome, pancreatitis and ulcerative colitis. There is no history of abdominal surgery, colon cancer, Crohn's disease or PUD. Patient's medical history does not include kidney stones and UTI.     Review of Systems   Constitutional: Positive for fatigue. Negative for activity change, appetite change, chills, diaphoresis, fever, unexpected weight change and weight loss.   HENT: Positive for sinus pressure/congestion. Negative for nasal congestion, dental problem, facial swelling, nosebleeds, postnasal drip, rhinorrhea, sore throat, tinnitus and trouble swallowing.    Eyes: Negative for pain, discharge, itching and visual disturbance.   Respiratory: Negative for apnea, chest tightness, shortness of breath, wheezing and stridor.    Cardiovascular:  "Negative for chest pain, palpitations and leg swelling.   Gastrointestinal: Positive for abdominal pain, anorexia and flatus. Negative for abdominal distention, constipation, diarrhea, hematochezia, melena, nausea, rectal pain and vomiting.   Endocrine: Negative for cold intolerance, heat intolerance and polyuria.   Genitourinary: Positive for hot flashes and menstrual irregularity. Negative for difficulty urinating, dysuria, hematuria and urgency.   Musculoskeletal: Positive for myalgias. Negative for arthralgias, gait problem, neck pain and neck stiffness.   Integumentary:  Negative for color change and rash.   Neurological: Negative for dizziness, tremors, seizures, syncope, facial asymmetry, weakness and headaches.   Hematological: Negative for adenopathy. Does not bruise/bleed easily.   Psychiatric/Behavioral: Positive for dysphoric mood and sleep disturbance. Negative for agitation, confusion, hallucinations, self-injury and suicidal ideas. The patient is nervous/anxious. The patient is not hyperactive.           Objective:       Vitals:    12/04/20 0953   BP: 128/72   Pulse: 84   Temp: 98.6 °F (37 °C)   TempSrc: Oral   SpO2: 99%   Weight: 76.1 kg (167 lb 12.3 oz)   Height: 5' 3" (1.6 m)     Physical Exam  Constitutional:       General: She is not in acute distress.     Appearance: She is well-developed. She is not diaphoretic.   HENT:      Head: Normocephalic and atraumatic.      Right Ear: External ear normal.      Left Ear: External ear normal.      Nose: Congestion present.   Eyes:      General: No scleral icterus.        Right eye: No discharge.         Left eye: No discharge.      Conjunctiva/sclera: Conjunctivae normal.      Pupils: Pupils are equal, round, and reactive to light.   Neck:      Musculoskeletal: Normal range of motion and neck supple.      Thyroid: No thyromegaly.   Cardiovascular:      Rate and Rhythm: Normal rate and regular rhythm.      Heart sounds: Normal heart sounds. No murmur. No " friction rub. No gallop.    Pulmonary:      Effort: Pulmonary effort is normal. No respiratory distress.      Breath sounds: Normal breath sounds.   Chest:      Chest wall: No tenderness.   Abdominal:      General: Bowel sounds are normal. There is no distension.      Palpations: Abdomen is soft. There is no mass.      Tenderness: There is no abdominal tenderness. There is no guarding or rebound.   Musculoskeletal: Normal range of motion.   Lymphadenopathy:      Cervical: No cervical adenopathy.   Skin:     General: Skin is warm.      Capillary Refill: Capillary refill takes less than 2 seconds.      Findings: No rash.   Neurological:      Mental Status: She is alert and oriented to person, place, and time.      Cranial Nerves: No cranial nerve deficit.      Motor: No abnormal muscle tone.      Coordination: Coordination normal.      Deep Tendon Reflexes: Reflexes normal.   Psychiatric:         Thought Content: Thought content normal.         Judgment: Judgment normal.         Assessment:       1. Annual physical exam    2. Grief    3. Anxiety    4. Chronic maxillary sinusitis    5. Essential hypertension    6. Gastroesophageal reflux disease without esophagitis    7. Perimenopausal symptoms    8. History of pancreatitis    9. Laboratory exam ordered as part of routine general medical examination    10. Encounter for screening colonoscopy        Plan:       1. Annual physical exam  Age appropriate prevention guidelines implemented, unless patient refused  Encourage Advanced directives  Labs ordered   Immunizations reviewed. Encourage yearly influenza vaccine.  Patient Counseling:  --Nutrition: Encourage healthy food choices, adequate water intake, moderate sodium/caffeine intake, diet low in saturated fat and cholesterol. Importance of caloric balance and sufficient intake of fresh fruits, vegetables, fiber, calcium, iron, and 1 mg of folate supplement per day (for females capable of pregnancy).  --Exercise:  Stressed the importance of regular exercise.   --Substance Abuse: Abstinence from tobacco products. No more than 2 alcoholic drinks per day in men or 1 alcoholic drink per day in women. Avoid illicit drugs, driving or other dangerous activities under the influence. In the event of abuse, treatment offered.   --Sexuality: Safe sex practices to avoid sexually transmitted diseases; careful partner selection, use of condoms and contraceptive alternatives, avoidance of unintended pregnancy in fertile women of child-bearing age  --Injury prevention: encourage safety belts, safety helmets, smoke detector.  --Dental health: Discussed importance of regular tooth brushing X2 daily, flossing X1 daily, and routine dental visits.  --Encourage use of sun screen, wear sun protective clothing  --Encourage routine eye exams    2. Grief  -     Ambulatory referral/consult to Psychology; Future; Expected date: 12/11/2020    3. Anxiety  -     Ambulatory referral/consult to Psychology; Future; Expected date: 12/11/2020  -     venlafaxine (EFFEXOR-XR) 37.5 MG 24 hr capsule; Take 1 capsule (37.5 mg total) by mouth once daily.  Dispense: 90 capsule; Refill: 3    4. Chronic maxillary sinusitis  -     cetirizine (ZYRTEC) 10 MG tablet; Take 1 tablet (10 mg total) by mouth daily as needed for Allergies.  Dispense: 90 tablet; Refill: 3  -     clindamycin (CLEOCIN) 300 MG capsule; Take 1 capsule (300 mg total) by mouth every 8 (eight) hours. for 10 days  Dispense: 30 capsule; Refill: 0  -     fluticasone propionate (FLONASE) 50 mcg/actuation nasal spray; 2 sprays (100 mcg total) by Each Nostril route 2 (two) times daily as needed for Rhinitis or Allergies.  Dispense: 16 g; Refill: 11    5. Essential hypertension  -     Microalbumin/Creatinine Ratio, Urine  -     amLODIPine (NORVASC) 10 MG tablet; Take 1 tablet (10 mg total) by mouth once daily.  Dispense: 90 tablet; Refill: 3    6. Gastroesophageal reflux disease without esophagitis  -      pantoprazole (PROTONIX) 40 MG tablet; Take 1 tablet (40 mg total) by mouth once daily.  Dispense: 90 tablet; Refill: 3    7. Perimenopausal symptoms  Trial of Venlafaxine    8. History of pancreatitis  No acute symptoms, hx of gallstone pancreatitis s/p cholecystectomy    9. Laboratory exam ordered as part of routine general medical examination  -     CBC Auto Differential; Future; Expected date: 12/04/2020  -     Comprehensive Metabolic Panel; Future; Expected date: 12/04/2020  -     Hemoglobin A1C; Future; Expected date: 12/04/2020  -     TSH; Future; Expected date: 12/04/2020  -     Vitamin D; Future; Expected date: 12/04/2020  -     Lipid Panel; Future; Expected date: 12/04/2020    10. Encounter for screening colonoscopy  -     Case Request Endoscopy: COLONOSCOPY    Disclaimer: This note has been generated using voice-recognition software. There may be typographical errors that have been missed during proof-reading

## 2020-12-04 NOTE — PATIENT INSTRUCTIONS
Colonoscopy  Please call to schedule the appointment for colonoscopy:  [facility]  or   [place] [id number]     Psychology: Please call  to schedule.

## 2020-12-05 LAB
ESTIMATED AVG GLUCOSE: 154 MG/DL (ref 68–131)
HBA1C MFR BLD HPLC: 7 % (ref 4–5.6)

## 2020-12-07 ENCOUNTER — TELEPHONE (OUTPATIENT)
Dept: PRIMARY CARE CLINIC | Facility: CLINIC | Age: 50
End: 2020-12-07

## 2020-12-07 NOTE — TELEPHONE ENCOUNTER
----- Message from Zeynep Salgado MD sent at 12/7/2020  1:10 PM CST -----  Please let patient know of message below    Hello,    There is no significant protein in the urine.  This is good!.   Take care!

## 2020-12-08 RX ORDER — ERGOCALCIFEROL 1.25 MG/1
50000 CAPSULE ORAL
Qty: 12 CAPSULE | Refills: 3 | Status: SHIPPED | OUTPATIENT
Start: 2020-12-08 | End: 2021-10-13 | Stop reason: SDUPTHER

## 2020-12-10 ENCOUNTER — PATIENT MESSAGE (OUTPATIENT)
Dept: PRIMARY CARE CLINIC | Facility: CLINIC | Age: 50
End: 2020-12-10

## 2020-12-10 ENCOUNTER — TELEPHONE (OUTPATIENT)
Dept: PRIMARY CARE CLINIC | Facility: CLINIC | Age: 50
End: 2020-12-10

## 2020-12-10 NOTE — TELEPHONE ENCOUNTER
----- Message from Zeynep Salgado MD sent at 12/8/2020 10:30 AM CST -----  I will like to see her back to discuss diabetes and to possibility of starting cholesterol medication.    Message sent to portal   Hello,    You have diabetes. We will need to discuss this further.    Vitamin-D is low.  I have sent a prescription for vitamin-D to pharmacy.  Vitamin-D is necessary for good bone health.    Alkaline phosphatase is elevated.  This has fluctuated over the course of 1 year.  Will continue to monitor.  Otherwise liver functions normal.    Normal kidney function test.    Elevated total cholesterol.  HDL, the good cholesterol is elevated.  I want your LDL, the bad cholesterol to be less than 100. Taking the new diagnosis of diabetes into consideration I will recommend that you start cholesterol medication. We need to discuss this further.    Normal thyroid function    Cell count stable.  No anemia.  Your cell size is a little large; no more than 1 alcoholic drink in 24 hrs if you do drink alcohol whatsoever is recommended.    - Please make an appointment virtual or inperson so we can finalize your plan of care.

## 2020-12-10 NOTE — TELEPHONE ENCOUNTER
----- Message from Kriss Suarez sent at 12/10/2020  9:00 AM CST -----  Contact: self [phone]  Patient is returning a phone call.  Who left a message for the patient: Dr Caro  Does patient know what this is regarding:  test results for diabetes  Comments: Pt states this is in response to an urgent Webspy message she received.

## 2020-12-11 ENCOUNTER — OFFICE VISIT (OUTPATIENT)
Dept: PRIMARY CARE CLINIC | Facility: CLINIC | Age: 50
End: 2020-12-11
Payer: COMMERCIAL

## 2020-12-11 DIAGNOSIS — E78.5 HYPERLIPIDEMIA DUE TO TYPE 2 DIABETES MELLITUS: ICD-10-CM

## 2020-12-11 DIAGNOSIS — E11.9 TYPE 2 DIABETES MELLITUS WITHOUT COMPLICATION, WITHOUT LONG-TERM CURRENT USE OF INSULIN: Primary | ICD-10-CM

## 2020-12-11 DIAGNOSIS — E11.69 HYPERLIPIDEMIA DUE TO TYPE 2 DIABETES MELLITUS: ICD-10-CM

## 2020-12-11 PROCEDURE — 99442 PR PHYSICIAN TELEPHONE EVALUATION 11-20 MIN: ICD-10-PCS | Mod: 95,,, | Performed by: FAMILY MEDICINE

## 2020-12-11 PROCEDURE — 99442 PR PHYSICIAN TELEPHONE EVALUATION 11-20 MIN: CPT | Mod: 95,,, | Performed by: FAMILY MEDICINE

## 2020-12-11 RX ORDER — ISOPROPYL ALCOHOL 70 ML/100ML
SWAB TOPICAL
Qty: 200 EACH | Refills: 3 | Status: SHIPPED | OUTPATIENT
Start: 2020-12-11 | End: 2023-01-23

## 2020-12-11 RX ORDER — ATORVASTATIN CALCIUM 10 MG/1
10 TABLET, FILM COATED ORAL NIGHTLY
Qty: 90 TABLET | Refills: 3 | Status: SHIPPED | OUTPATIENT
Start: 2020-12-11 | End: 2021-10-13 | Stop reason: SDUPTHER

## 2020-12-11 RX ORDER — LANCETS
EACH MISCELLANEOUS
Qty: 200 EACH | Refills: 3 | Status: SHIPPED | OUTPATIENT
Start: 2020-12-11 | End: 2023-01-23

## 2020-12-11 RX ORDER — METFORMIN HYDROCHLORIDE 500 MG/1
500 TABLET, EXTENDED RELEASE ORAL
Qty: 90 TABLET | Refills: 3 | Status: SHIPPED | OUTPATIENT
Start: 2020-12-11 | End: 2021-01-08 | Stop reason: SDUPTHER

## 2020-12-11 RX ORDER — INSULIN PUMP SYRINGE, 3 ML
EACH MISCELLANEOUS
Qty: 1 EACH | Refills: 0 | Status: SHIPPED | OUTPATIENT
Start: 2020-12-11 | End: 2023-01-05 | Stop reason: SDUPTHER

## 2020-12-11 NOTE — PROGRESS NOTES
Established Patient - Audio Only Telehealth Visit     The patient location is: home  The chief complaint leading to consultation is: Diabetes, hyperlipidemia  Visit type: Virtual visit with audio only (telephone)  Total time spent with patient: 15 mins       The reason for the audio only service rather than synchronous audio and video virtual visit was related to technical difficulties or patient preference/necessity.     Each patient to whom I provide medical services by telemedicine is:  (1) informed of the relationship between the physician and patient and the respective role of any other health care provider with respect to management of the patient; and (2) notified that they may decline to receive medical services by telemedicine and may withdraw from such care at any time. Patient verbally consented to receive this service via voice-only telephone call.       HPI: 51 yo female with recent diagnosis of type 2 diabetes is committed to make healthier lifestyle choices. Already eating less sugary and starch foods. Plans to use exercise bike. She will be interested in pharmacotherapy. She had an eye exam done but was not aware that she had diabetes; therefore eyes were not dilated.    No visits with results within 7 Day(s) from this visit.   Latest known visit with results is:   Lab Visit on 12/04/2020   Component Date Value Ref Range Status    WBC 12/04/2020 4.37  3.90 - 12.70 K/uL Final    RBC 12/04/2020 3.91* 4.00 - 5.40 M/uL Final    Hemoglobin 12/04/2020 12.6  12.0 - 16.0 g/dL Final    Hematocrit 12/04/2020 39.4  37.0 - 48.5 % Final    MCV 12/04/2020 101* 82 - 98 fL Final    MCH 12/04/2020 32.2* 27.0 - 31.0 pg Final    MCHC 12/04/2020 32.0  32.0 - 36.0 g/dL Final    RDW 12/04/2020 13.2  11.5 - 14.5 % Final    Platelets 12/04/2020 382* 150 - 350 K/uL Final    MPV 12/04/2020 10.2  9.2 - 12.9 fL Final    Immature Granulocytes 12/04/2020 0.0  0.0 - 0.5 % Final    Gran # (ANC) 12/04/2020 2.0  1.8 - 7.7  K/uL Final    Immature Grans (Abs) 12/04/2020 0.00  0.00 - 0.04 K/uL Final    Comment: Mild elevation in immature granulocytes is non specific and   can be seen in a variety of conditions including stress response,   acute inflammation, trauma and pregnancy. Correlation with other   laboratory and clinical findings is essential.      Lymph # 12/04/2020 1.9  1.0 - 4.8 K/uL Final    Mono # 12/04/2020 0.4  0.3 - 1.0 K/uL Final    Eos # 12/04/2020 0.1  0.0 - 0.5 K/uL Final    Baso # 12/04/2020 0.04  0.00 - 0.20 K/uL Final    nRBC 12/04/2020 0  0 /100 WBC Final    Gran % 12/04/2020 46.5  38.0 - 73.0 % Final    Lymph % 12/04/2020 43.2  18.0 - 48.0 % Final    Mono % 12/04/2020 8.0  4.0 - 15.0 % Final    Eosinophil % 12/04/2020 1.4  0.0 - 8.0 % Final    Basophil % 12/04/2020 0.9  0.0 - 1.9 % Final    Differential Method 12/04/2020 Automated   Final    Sodium 12/04/2020 138  136 - 145 mmol/L Final    Potassium 12/04/2020 4.1  3.5 - 5.1 mmol/L Final    Chloride 12/04/2020 103  95 - 110 mmol/L Final    CO2 12/04/2020 28  23 - 29 mmol/L Final    Glucose 12/04/2020 167* 70 - 110 mg/dL Final    BUN 12/04/2020 6  6 - 20 mg/dL Final    Creatinine 12/04/2020 0.7  0.5 - 1.4 mg/dL Final    Calcium 12/04/2020 9.5  8.7 - 10.5 mg/dL Final    Total Protein 12/04/2020 7.6  6.0 - 8.4 g/dL Final    Albumin 12/04/2020 4.0  3.5 - 5.2 g/dL Final    Total Bilirubin 12/04/2020 0.2  0.1 - 1.0 mg/dL Final    Comment: For infants and newborns, interpretation of results should be based  on gestational age, weight and in agreement with clinical  observations.  Premature Infant recommended reference ranges:  Up to 24 hours.............<8.0 mg/dL  Up to 48 hours............<12.0 mg/dL  3-5 days..................<15.0 mg/dL  6-29 days.................<15.0 mg/dL      Alkaline Phosphatase 12/04/2020 136* 55 - 135 U/L Final    AST 12/04/2020 36  10 - 40 U/L Final    ALT 12/04/2020 34  10 - 44 U/L Final    Anion Gap 12/04/2020 7*  8 - 16 mmol/L Final    eGFR if African American 12/04/2020 >60.0  >60 mL/min/1.73 m^2 Final    eGFR if non African American 12/04/2020 >60.0  >60 mL/min/1.73 m^2 Final    Comment: Calculation used to obtain the estimated glomerular filtration  rate (eGFR) is the CKD-EPI equation.       Hemoglobin A1C 12/04/2020 7.0* 4.0 - 5.6 % Final    Comment: ADA Screening Guidelines:  5.7-6.4%  Consistent with prediabetes  >or=6.5%  Consistent with diabetes  High levels of fetal hemoglobin interfere with the HbA1C  assay. Heterozygous hemoglobin variants (HbS, HgC, etc)do  not significantly interfere with this assay.   However, presence of multiple variants may affect accuracy.      Estimated Avg Glucose 12/04/2020 154* 68 - 131 mg/dL Final    TSH 12/04/2020 0.900  0.400 - 4.000 uIU/mL Final    Vit D, 25-Hydroxy 12/04/2020 8* 30 - 96 ng/mL Final    Comment: Vitamin D deficiency.........<10 ng/mL                              Vitamin D insufficiency......10-29 ng/mL       Vitamin D sufficiency........> or equal to 30 ng/mL  Vitamin D toxicity............>100 ng/mL      Cholesterol 12/04/2020 225* 120 - 199 mg/dL Final    Comment: The National Cholesterol Education Program (NCEP) has set the  following guidelines (reference ranges) for Cholesterol:  Optimal.....................<200 mg/dL  Borderline High.............200-239 mg/dL  High........................> or = 240 mg/dL      Triglycerides 12/04/2020 93  30 - 150 mg/dL Final    Comment: The National Cholesterol Education Program (NCEP) has set the  following guidelines (reference values) for triglycerides:  Normal......................<150 mg/dL  Borderline High.............150-199 mg/dL  High........................200-499 mg/dL      HDL 12/04/2020 97* 40 - 75 mg/dL Final    Comment: The National Cholesterol Education Program (NCEP) has set the  following guidelines (reference values) for HDL Cholesterol:  Low...............<40 mg/dL  Optimal...........>60 mg/dL       LDL Cholesterol 12/04/2020 109.4  63.0 - 159.0 mg/dL Final    Comment: The National Cholesterol Education Program (NCEP) has set the  following guidelines (reference values) for LDL Cholesterol:  Optimal.......................<130 mg/dL  Borderline High...............130-159 mg/dL  High..........................160-189 mg/dL  Very High.....................>190 mg/dL      HDL/Cholesterol Ratio 12/04/2020 43.1  20.0 - 50.0 % Final    Total Cholesterol/HDL Ratio 12/04/2020 2.3  2.0 - 5.0 Final    Non-HDL Cholesterol 12/04/2020 128  mg/dL Final    Comment: Risk category and Non-HDL cholesterol goals:  Coronary heart disease (CHD)or equivalent (10-year risk of CHD >20%):  Non-HDL cholesterol goal     <130 mg/dL  Two or more CHD risk factors and 10-year risk of CHD <= 20%:  Non-HDL cholesterol goal     <160 mg/dL  0 to 1 CHD risk factor:  Non-HDL cholesterol goal     <190 mg/dL       Assessment and plan:    1. Type 2 diabetes mellitus without complication, without long-term current use of insulin  -     Ambulatory referral/consult to Diabetes Education; Future; Expected date: 12/18/2020  -     blood sugar diagnostic Strp; Monitor as directed. Per insurance coverage.  Dispense: 200 each; Refill: 3  -     blood-glucose meter kit; Monitor as directed. Per insurance coverage.  Dispense: 1 each; Refill: 0  -     alcohol swabs (ALCOHOL WIPES) PadM; Monitor as directed. Per insurance coverage.  Dispense: 200 each; Refill: 3  -     lancets Misc; Per insurance coverage.  Dispense: 200 each; Refill: 3  -     metFORMIN (GLUCOPHAGE-XR) 500 MG ER 24hr tablet; Take 1 tablet (500 mg total) by mouth daily with breakfast.  Dispense: 90 tablet; Refill: 3  -     Basic Metabolic Panel; Future; Expected date: 03/11/2021  -     Hemoglobin A1C; Future; Expected date: 03/11/2021  -     Ambulatory referral/consult to Optometry; Future; Expected date: 12/18/2020    2. Hyperlipidemia due to type 2 diabetes mellitus  -     atorvastatin (LIPITOR)  10 MG tablet; Take 1 tablet (10 mg total) by mouth every evening.  Dispense: 90 tablet; Refill: 3. Counseled on myopathy.  -     Lipid Panel; Future; Expected date: 03/11/2021  Aim for LDL < 100    Labs: BMP, A1c, lipid panel in 3 months fasting  Set up diabetic Eye exam  Set up appointment for diabetes educator: comprehensive education; healthy eating and exercise, low dose metformin (counselled on GI side effects).   I want to see her again in 1 month: bring in blood sugar logs (fasting, before meals, 2 hrs after meals, bedtime); may check blood sugar 1-2 X daily at home. Will do foot exam at next appointment.         This service was not originating from a related E/M service provided within the previous 7 days nor will  to an E/M service or procedure within the next 24 hours or my soonest available appointment.  Prevailing standard of care was able to be met in this audio-only visit.

## 2020-12-16 ENCOUNTER — PATIENT OUTREACH (OUTPATIENT)
Dept: ADMINISTRATIVE | Facility: OTHER | Age: 50
End: 2020-12-16

## 2020-12-16 NOTE — PROGRESS NOTES
Care Everywhere: updated  Immunization: no profile in links   Health Maintenance: updated  Media Review: review for outside colon cancer report  Legacy Review:   Order placed:   Upcoming appts:optometry 2.5.2021 12.4.2020 colonoscopy case request

## 2021-01-04 ENCOUNTER — PATIENT MESSAGE (OUTPATIENT)
Dept: ADMINISTRATIVE | Facility: HOSPITAL | Age: 51
End: 2021-01-04

## 2021-01-08 ENCOUNTER — OFFICE VISIT (OUTPATIENT)
Dept: PRIMARY CARE CLINIC | Facility: CLINIC | Age: 51
End: 2021-01-08
Payer: COMMERCIAL

## 2021-01-08 VITALS
WEIGHT: 166.88 LBS | HEIGHT: 63 IN | TEMPERATURE: 99 F | DIASTOLIC BLOOD PRESSURE: 74 MMHG | OXYGEN SATURATION: 100 % | HEART RATE: 71 BPM | BODY MASS INDEX: 29.57 KG/M2 | SYSTOLIC BLOOD PRESSURE: 120 MMHG

## 2021-01-08 DIAGNOSIS — E11.9 ENCOUNTER FOR DIABETIC FOOT EXAM: ICD-10-CM

## 2021-01-08 DIAGNOSIS — I10 ESSENTIAL HYPERTENSION: ICD-10-CM

## 2021-01-08 DIAGNOSIS — E11.9 TYPE 2 DIABETES MELLITUS WITHOUT COMPLICATION, WITHOUT LONG-TERM CURRENT USE OF INSULIN: Primary | ICD-10-CM

## 2021-01-08 DIAGNOSIS — Z12.11 SCREENING FOR COLON CANCER: ICD-10-CM

## 2021-01-08 DIAGNOSIS — E78.5 HYPERLIPIDEMIA ASSOCIATED WITH TYPE 2 DIABETES MELLITUS: ICD-10-CM

## 2021-01-08 DIAGNOSIS — E11.69 HYPERLIPIDEMIA ASSOCIATED WITH TYPE 2 DIABETES MELLITUS: ICD-10-CM

## 2021-01-08 PROCEDURE — 1126F PR PAIN SEVERITY QUANTIFIED, NO PAIN PRESENT: ICD-10-PCS | Mod: S$GLB,,, | Performed by: FAMILY MEDICINE

## 2021-01-08 PROCEDURE — 3078F DIAST BP <80 MM HG: CPT | Mod: CPTII,S$GLB,, | Performed by: FAMILY MEDICINE

## 2021-01-08 PROCEDURE — 3074F PR MOST RECENT SYSTOLIC BLOOD PRESSURE < 130 MM HG: ICD-10-PCS | Mod: CPTII,S$GLB,, | Performed by: FAMILY MEDICINE

## 2021-01-08 PROCEDURE — 3051F HG A1C>EQUAL 7.0%<8.0%: CPT | Mod: CPTII,S$GLB,, | Performed by: FAMILY MEDICINE

## 2021-01-08 PROCEDURE — 3008F BODY MASS INDEX DOCD: CPT | Mod: CPTII,S$GLB,, | Performed by: FAMILY MEDICINE

## 2021-01-08 PROCEDURE — 99214 PR OFFICE/OUTPT VISIT, EST, LEVL IV, 30-39 MIN: ICD-10-PCS | Mod: S$GLB,,, | Performed by: FAMILY MEDICINE

## 2021-01-08 PROCEDURE — 99999 PR PBB SHADOW E&M-EST. PATIENT-LVL IV: CPT | Mod: PBBFAC,,, | Performed by: FAMILY MEDICINE

## 2021-01-08 PROCEDURE — 3078F PR MOST RECENT DIASTOLIC BLOOD PRESSURE < 80 MM HG: ICD-10-PCS | Mod: CPTII,S$GLB,, | Performed by: FAMILY MEDICINE

## 2021-01-08 PROCEDURE — 3008F PR BODY MASS INDEX (BMI) DOCUMENTED: ICD-10-PCS | Mod: CPTII,S$GLB,, | Performed by: FAMILY MEDICINE

## 2021-01-08 PROCEDURE — 99999 PR PBB SHADOW E&M-EST. PATIENT-LVL IV: ICD-10-PCS | Mod: PBBFAC,,, | Performed by: FAMILY MEDICINE

## 2021-01-08 PROCEDURE — 3051F PR MOST RECENT HEMOGLOBIN A1C LEVEL 7.0 - < 8.0%: ICD-10-PCS | Mod: CPTII,S$GLB,, | Performed by: FAMILY MEDICINE

## 2021-01-08 PROCEDURE — 1126F AMNT PAIN NOTED NONE PRSNT: CPT | Mod: S$GLB,,, | Performed by: FAMILY MEDICINE

## 2021-01-08 PROCEDURE — 99214 OFFICE O/P EST MOD 30 MIN: CPT | Mod: S$GLB,,, | Performed by: FAMILY MEDICINE

## 2021-01-08 PROCEDURE — 3074F SYST BP LT 130 MM HG: CPT | Mod: CPTII,S$GLB,, | Performed by: FAMILY MEDICINE

## 2021-01-08 RX ORDER — METFORMIN HYDROCHLORIDE 500 MG/1
500 TABLET, EXTENDED RELEASE ORAL 2 TIMES DAILY
Qty: 180 TABLET | Refills: 3 | Status: SHIPPED | OUTPATIENT
Start: 2021-01-08 | End: 2021-10-13 | Stop reason: SDUPTHER

## 2021-01-11 LAB
FINAL PATHOLOGIC DIAGNOSIS: NORMAL
Lab: NORMAL

## 2021-02-04 ENCOUNTER — PATIENT OUTREACH (OUTPATIENT)
Dept: ADMINISTRATIVE | Facility: OTHER | Age: 51
End: 2021-02-04

## 2021-03-02 RX ORDER — DICYCLOMINE HYDROCHLORIDE 10 MG/1
10 CAPSULE ORAL 3 TIMES DAILY PRN
Qty: 90 CAPSULE | Refills: 1 | Status: SHIPPED | OUTPATIENT
Start: 2021-03-02 | End: 2021-03-11

## 2021-03-04 ENCOUNTER — PATIENT MESSAGE (OUTPATIENT)
Dept: PRIMARY CARE CLINIC | Facility: CLINIC | Age: 51
End: 2021-03-04

## 2021-03-04 ENCOUNTER — TELEPHONE (OUTPATIENT)
Dept: ENDOSCOPY | Facility: HOSPITAL | Age: 51
End: 2021-03-04

## 2021-03-04 ENCOUNTER — TELEPHONE (OUTPATIENT)
Dept: PRIMARY CARE CLINIC | Facility: CLINIC | Age: 51
End: 2021-03-04

## 2021-03-04 DIAGNOSIS — Z12.11 SPECIAL SCREENING FOR MALIGNANT NEOPLASMS, COLON: Primary | ICD-10-CM

## 2021-03-04 RX ORDER — SODIUM, POTASSIUM,MAG SULFATES 17.5-3.13G
1 SOLUTION, RECONSTITUTED, ORAL ORAL DAILY
Qty: 1 KIT | Refills: 0 | Status: SHIPPED | OUTPATIENT
Start: 2021-03-04 | End: 2021-03-06

## 2021-03-05 ENCOUNTER — PATIENT MESSAGE (OUTPATIENT)
Dept: ENDOSCOPY | Facility: HOSPITAL | Age: 51
End: 2021-03-05

## 2021-03-09 ENCOUNTER — PATIENT OUTREACH (OUTPATIENT)
Dept: ADMINISTRATIVE | Facility: OTHER | Age: 51
End: 2021-03-09

## 2021-03-11 ENCOUNTER — OFFICE VISIT (OUTPATIENT)
Dept: OPTOMETRY | Facility: CLINIC | Age: 51
End: 2021-03-11
Payer: COMMERCIAL

## 2021-03-11 ENCOUNTER — OFFICE VISIT (OUTPATIENT)
Dept: GASTROENTEROLOGY | Facility: CLINIC | Age: 51
End: 2021-03-11
Payer: COMMERCIAL

## 2021-03-11 VITALS
SYSTOLIC BLOOD PRESSURE: 140 MMHG | WEIGHT: 165.81 LBS | BODY MASS INDEX: 30.51 KG/M2 | HEART RATE: 81 BPM | DIASTOLIC BLOOD PRESSURE: 84 MMHG | HEIGHT: 62 IN

## 2021-03-11 DIAGNOSIS — E11.9 TYPE 2 DIABETES MELLITUS WITHOUT COMPLICATION, WITHOUT LONG-TERM CURRENT USE OF INSULIN: Primary | ICD-10-CM

## 2021-03-11 DIAGNOSIS — H52.7 REFRACTIVE ERROR: ICD-10-CM

## 2021-03-11 DIAGNOSIS — Q45.3 PANCREATIC ABNORMALITY: ICD-10-CM

## 2021-03-11 DIAGNOSIS — Z46.0 ENCOUNTER FOR FITTING OR ADJUSTMENT OF SPECTACLES OR CONTACT LENSES: Primary | ICD-10-CM

## 2021-03-11 DIAGNOSIS — Z97.3 WEARS CONTACT LENSES: ICD-10-CM

## 2021-03-11 DIAGNOSIS — R10.13 EPIGASTRIC ABDOMINAL PAIN: Primary | ICD-10-CM

## 2021-03-11 PROCEDURE — 3079F DIAST BP 80-89 MM HG: CPT | Mod: CPTII,S$GLB,, | Performed by: NURSE PRACTITIONER

## 2021-03-11 PROCEDURE — 99999 PR PBB SHADOW E&M-EST. PATIENT-LVL III: ICD-10-PCS | Mod: PBBFAC,,, | Performed by: OPTOMETRIST

## 2021-03-11 PROCEDURE — 99214 OFFICE O/P EST MOD 30 MIN: CPT | Mod: S$GLB,,, | Performed by: NURSE PRACTITIONER

## 2021-03-11 PROCEDURE — 3079F PR MOST RECENT DIASTOLIC BLOOD PRESSURE 80-89 MM HG: ICD-10-PCS | Mod: CPTII,S$GLB,, | Performed by: NURSE PRACTITIONER

## 2021-03-11 PROCEDURE — 3008F PR BODY MASS INDEX (BMI) DOCUMENTED: ICD-10-PCS | Mod: CPTII,S$GLB,, | Performed by: NURSE PRACTITIONER

## 2021-03-11 PROCEDURE — 92004 PR EYE EXAM, NEW PATIENT,COMPREHESV: ICD-10-PCS | Mod: S$GLB,,, | Performed by: OPTOMETRIST

## 2021-03-11 PROCEDURE — 1126F AMNT PAIN NOTED NONE PRSNT: CPT | Mod: S$GLB,,, | Performed by: NURSE PRACTITIONER

## 2021-03-11 PROCEDURE — 3077F SYST BP >= 140 MM HG: CPT | Mod: CPTII,S$GLB,, | Performed by: NURSE PRACTITIONER

## 2021-03-11 PROCEDURE — 92310 CONTACT LENS FITTING OU: CPT | Mod: CSM,S$GLB,, | Performed by: OPTOMETRIST

## 2021-03-11 PROCEDURE — 99999 PR PBB SHADOW E&M-EST. PATIENT-LVL III: CPT | Mod: PBBFAC,,, | Performed by: NURSE PRACTITIONER

## 2021-03-11 PROCEDURE — 99499 UNLISTED E&M SERVICE: CPT | Mod: S$GLB,,, | Performed by: OPTOMETRIST

## 2021-03-11 PROCEDURE — 92015 PR REFRACTION: ICD-10-PCS | Mod: S$GLB,,, | Performed by: OPTOMETRIST

## 2021-03-11 PROCEDURE — 99214 PR OFFICE/OUTPT VISIT, EST, LEVL IV, 30-39 MIN: ICD-10-PCS | Mod: S$GLB,,, | Performed by: NURSE PRACTITIONER

## 2021-03-11 PROCEDURE — 92004 COMPRE OPH EXAM NEW PT 1/>: CPT | Mod: S$GLB,,, | Performed by: OPTOMETRIST

## 2021-03-11 PROCEDURE — 92310 PR CONTACT LENS FITTING (NO CHANGE): ICD-10-PCS | Mod: CSM,S$GLB,, | Performed by: OPTOMETRIST

## 2021-03-11 PROCEDURE — 2023F DILAT RTA XM W/O RTNOPTHY: CPT | Mod: S$GLB,,, | Performed by: OPTOMETRIST

## 2021-03-11 PROCEDURE — 3008F BODY MASS INDEX DOCD: CPT | Mod: CPTII,S$GLB,, | Performed by: NURSE PRACTITIONER

## 2021-03-11 PROCEDURE — 99499 NO LOS: ICD-10-PCS | Mod: S$GLB,,, | Performed by: OPTOMETRIST

## 2021-03-11 PROCEDURE — 2023F PR DILATED RETINAL EXAM W/O EVID OF RETINOPATHY: ICD-10-PCS | Mod: S$GLB,,, | Performed by: OPTOMETRIST

## 2021-03-11 PROCEDURE — 99999 PR PBB SHADOW E&M-EST. PATIENT-LVL III: ICD-10-PCS | Mod: PBBFAC,,, | Performed by: NURSE PRACTITIONER

## 2021-03-11 PROCEDURE — 3077F PR MOST RECENT SYSTOLIC BLOOD PRESSURE >= 140 MM HG: ICD-10-PCS | Mod: CPTII,S$GLB,, | Performed by: NURSE PRACTITIONER

## 2021-03-11 PROCEDURE — 1126F PR PAIN SEVERITY QUANTIFIED, NO PAIN PRESENT: ICD-10-PCS | Mod: S$GLB,,, | Performed by: NURSE PRACTITIONER

## 2021-03-11 PROCEDURE — 92015 DETERMINE REFRACTIVE STATE: CPT | Mod: S$GLB,,, | Performed by: OPTOMETRIST

## 2021-03-11 PROCEDURE — 99999 PR PBB SHADOW E&M-EST. PATIENT-LVL III: CPT | Mod: PBBFAC,,, | Performed by: OPTOMETRIST

## 2021-03-11 RX ORDER — PANCRELIPASE 24000; 76000; 120000 [USP'U]/1; [USP'U]/1; [USP'U]/1
2 CAPSULE, DELAYED RELEASE PELLETS ORAL
Qty: 240 CAPSULE | Refills: 1 | Status: SHIPPED | OUTPATIENT
Start: 2021-03-11 | End: 2023-07-06

## 2021-03-11 RX ORDER — SUCRALFATE 1 G/1
1 TABLET ORAL 3 TIMES DAILY PRN
Qty: 90 TABLET | Refills: 1 | Status: SHIPPED | OUTPATIENT
Start: 2021-03-11 | End: 2022-10-03 | Stop reason: SDUPTHER

## 2021-03-15 ENCOUNTER — TELEPHONE (OUTPATIENT)
Dept: GASTROENTEROLOGY | Facility: CLINIC | Age: 51
End: 2021-03-15

## 2021-03-15 ENCOUNTER — LAB VISIT (OUTPATIENT)
Dept: INTERNAL MEDICINE | Facility: CLINIC | Age: 51
End: 2021-03-15
Payer: COMMERCIAL

## 2021-03-15 DIAGNOSIS — Z01.818 PRE-OP TESTING: ICD-10-CM

## 2021-03-15 PROCEDURE — U0003 INFECTIOUS AGENT DETECTION BY NUCLEIC ACID (DNA OR RNA); SEVERE ACUTE RESPIRATORY SYNDROME CORONAVIRUS 2 (SARS-COV-2) (CORONAVIRUS DISEASE [COVID-19]), AMPLIFIED PROBE TECHNIQUE, MAKING USE OF HIGH THROUGHPUT TECHNOLOGIES AS DESCRIBED BY CMS-2020-01-R: HCPCS | Performed by: FAMILY MEDICINE

## 2021-03-15 PROCEDURE — U0005 INFEC AGEN DETEC AMPLI PROBE: HCPCS | Performed by: FAMILY MEDICINE

## 2021-03-16 ENCOUNTER — PATIENT MESSAGE (OUTPATIENT)
Dept: GASTROENTEROLOGY | Facility: CLINIC | Age: 51
End: 2021-03-16

## 2021-03-17 LAB — SARS-COV-2 RNA RESP QL NAA+PROBE: NOT DETECTED

## 2021-03-18 ENCOUNTER — HOSPITAL ENCOUNTER (OUTPATIENT)
Facility: HOSPITAL | Age: 51
Discharge: HOME | End: 2021-03-18
Attending: INTERNAL MEDICINE | Admitting: INTERNAL MEDICINE
Payer: COMMERCIAL

## 2021-03-18 ENCOUNTER — TELEPHONE (OUTPATIENT)
Dept: PRIMARY CARE CLINIC | Facility: CLINIC | Age: 51
End: 2021-03-18

## 2021-03-18 ENCOUNTER — ANESTHESIA (OUTPATIENT)
Dept: ENDOSCOPY | Facility: HOSPITAL | Age: 51
End: 2021-03-18
Payer: COMMERCIAL

## 2021-03-18 ENCOUNTER — ANESTHESIA EVENT (OUTPATIENT)
Dept: ENDOSCOPY | Facility: HOSPITAL | Age: 51
End: 2021-03-18
Payer: COMMERCIAL

## 2021-03-18 VITALS
RESPIRATION RATE: 13 BRPM | BODY MASS INDEX: 29.23 KG/M2 | WEIGHT: 165 LBS | DIASTOLIC BLOOD PRESSURE: 71 MMHG | OXYGEN SATURATION: 100 % | HEART RATE: 54 BPM | TEMPERATURE: 98 F | SYSTOLIC BLOOD PRESSURE: 144 MMHG | HEIGHT: 63 IN

## 2021-03-18 DIAGNOSIS — Z12.11 COLON CANCER SCREENING: Primary | ICD-10-CM

## 2021-03-18 DIAGNOSIS — Z12.11 SCREENING FOR COLON CANCER: ICD-10-CM

## 2021-03-18 LAB
B-HCG UR QL: NEGATIVE
CTP QC/QA: YES
GLUCOSE SERPL-MCNC: 121 MG/DL (ref 70–110)
POCT GLUCOSE: 121 MG/DL (ref 70–110)

## 2021-03-18 PROCEDURE — G0121 COLON CA SCRN NOT HI RSK IND: HCPCS | Performed by: INTERNAL MEDICINE

## 2021-03-18 PROCEDURE — 63600175 PHARM REV CODE 636 W HCPCS: Performed by: NURSE ANESTHETIST, CERTIFIED REGISTERED

## 2021-03-18 PROCEDURE — 81025 URINE PREGNANCY TEST: CPT | Performed by: INTERNAL MEDICINE

## 2021-03-18 PROCEDURE — 37000009 HC ANESTHESIA EA ADD 15 MINS: Performed by: INTERNAL MEDICINE

## 2021-03-18 PROCEDURE — 82962 GLUCOSE BLOOD TEST: CPT | Performed by: INTERNAL MEDICINE

## 2021-03-18 PROCEDURE — G0121 COLON CA SCRN NOT HI RSK IND: HCPCS | Mod: ,,, | Performed by: INTERNAL MEDICINE

## 2021-03-18 PROCEDURE — 25000003 PHARM REV CODE 250: Performed by: NURSE ANESTHETIST, CERTIFIED REGISTERED

## 2021-03-18 PROCEDURE — E9220 PRA ENDO ANESTHESIA: HCPCS | Mod: ,,, | Performed by: NURSE ANESTHETIST, CERTIFIED REGISTERED

## 2021-03-18 PROCEDURE — 37000008 HC ANESTHESIA 1ST 15 MINUTES: Performed by: INTERNAL MEDICINE

## 2021-03-18 PROCEDURE — G0121 COLON CA SCRN NOT HI RSK IND: ICD-10-PCS | Mod: ,,, | Performed by: INTERNAL MEDICINE

## 2021-03-18 PROCEDURE — E9220 PRA ENDO ANESTHESIA: ICD-10-PCS | Mod: ,,, | Performed by: NURSE ANESTHETIST, CERTIFIED REGISTERED

## 2021-03-18 RX ORDER — PROPOFOL 10 MG/ML
VIAL (ML) INTRAVENOUS CONTINUOUS PRN
Status: DISCONTINUED | OUTPATIENT
Start: 2021-03-18 | End: 2021-03-18

## 2021-03-18 RX ORDER — LIDOCAINE HYDROCHLORIDE 20 MG/ML
INJECTION INTRAVENOUS
Status: DISCONTINUED | OUTPATIENT
Start: 2021-03-18 | End: 2021-03-18

## 2021-03-18 RX ORDER — SODIUM CHLORIDE 9 MG/ML
INJECTION, SOLUTION INTRAVENOUS CONTINUOUS
Status: DISCONTINUED | OUTPATIENT
Start: 2021-03-18 | End: 2021-03-18 | Stop reason: HOSPADM

## 2021-03-18 RX ORDER — PROPOFOL 10 MG/ML
VIAL (ML) INTRAVENOUS
Status: DISCONTINUED | OUTPATIENT
Start: 2021-03-18 | End: 2021-03-18

## 2021-03-18 RX ADMIN — SODIUM CHLORIDE: 0.9 INJECTION, SOLUTION INTRAVENOUS at 11:03

## 2021-03-18 RX ADMIN — PROPOFOL 60 MG: 10 INJECTION, EMULSION INTRAVENOUS at 12:03

## 2021-03-18 RX ADMIN — PROPOFOL 30 MG: 10 INJECTION, EMULSION INTRAVENOUS at 12:03

## 2021-03-18 RX ADMIN — LIDOCAINE HYDROCHLORIDE 60 MG: 20 INJECTION, SOLUTION INTRAVENOUS at 12:03

## 2021-03-18 RX ADMIN — PROPOFOL 20 MG: 10 INJECTION, EMULSION INTRAVENOUS at 12:03

## 2021-03-18 RX ADMIN — PROPOFOL 150 MCG/KG/MIN: 10 INJECTION, EMULSION INTRAVENOUS at 12:03

## 2021-04-26 ENCOUNTER — PATIENT MESSAGE (OUTPATIENT)
Dept: RESEARCH | Facility: HOSPITAL | Age: 51
End: 2021-04-26

## 2021-06-10 ENCOUNTER — PATIENT MESSAGE (OUTPATIENT)
Dept: PRIMARY CARE CLINIC | Facility: CLINIC | Age: 51
End: 2021-06-10

## 2021-06-22 ENCOUNTER — PATIENT MESSAGE (OUTPATIENT)
Dept: PRIMARY CARE CLINIC | Facility: CLINIC | Age: 51
End: 2021-06-22

## 2021-06-24 ENCOUNTER — PATIENT MESSAGE (OUTPATIENT)
Dept: ADMINISTRATIVE | Facility: OTHER | Age: 51
End: 2021-06-24

## 2021-06-24 ENCOUNTER — PATIENT OUTREACH (OUTPATIENT)
Dept: ADMINISTRATIVE | Facility: OTHER | Age: 51
End: 2021-06-24

## 2021-06-24 DIAGNOSIS — Z12.31 ENCOUNTER FOR SCREENING MAMMOGRAM FOR MALIGNANT NEOPLASM OF BREAST: Primary | ICD-10-CM

## 2021-06-25 ENCOUNTER — CLINICAL SUPPORT (OUTPATIENT)
Dept: DIABETES | Facility: CLINIC | Age: 51
End: 2021-06-25
Payer: COMMERCIAL

## 2021-06-25 DIAGNOSIS — E11.9 TYPE 2 DIABETES MELLITUS WITHOUT COMPLICATION, WITHOUT LONG-TERM CURRENT USE OF INSULIN: ICD-10-CM

## 2021-06-25 PROCEDURE — G0108 DIAB MANAGE TRN  PER INDIV: HCPCS | Mod: S$GLB,,, | Performed by: INTERNAL MEDICINE

## 2021-06-25 PROCEDURE — 99999 PR PBB SHADOW E&M-EST. PATIENT-LVL I: ICD-10-PCS | Mod: PBBFAC,,,

## 2021-06-25 PROCEDURE — G0108 PR DIAB MANAGE TRN  PER INDIV: ICD-10-PCS | Mod: S$GLB,,, | Performed by: INTERNAL MEDICINE

## 2021-06-25 PROCEDURE — 99999 PR PBB SHADOW E&M-EST. PATIENT-LVL I: CPT | Mod: PBBFAC,,,

## 2021-06-28 VITALS — BODY MASS INDEX: 29.23 KG/M2 | WEIGHT: 165 LBS

## 2021-07-07 ENCOUNTER — PATIENT MESSAGE (OUTPATIENT)
Dept: ADMINISTRATIVE | Facility: HOSPITAL | Age: 51
End: 2021-07-07

## 2021-07-29 ENCOUNTER — PATIENT OUTREACH (OUTPATIENT)
Dept: ADMINISTRATIVE | Facility: OTHER | Age: 51
End: 2021-07-29

## 2021-08-03 ENCOUNTER — PATIENT MESSAGE (OUTPATIENT)
Dept: ADMINISTRATIVE | Facility: HOSPITAL | Age: 51
End: 2021-08-03

## 2021-08-13 ENCOUNTER — HOSPITAL ENCOUNTER (OUTPATIENT)
Dept: RADIOLOGY | Facility: HOSPITAL | Age: 51
Discharge: HOME | End: 2021-08-13
Attending: FAMILY MEDICINE
Payer: COMMERCIAL

## 2021-08-13 DIAGNOSIS — Z12.31 ENCOUNTER FOR SCREENING MAMMOGRAM FOR MALIGNANT NEOPLASM OF BREAST: ICD-10-CM

## 2021-08-13 PROCEDURE — 77067 SCR MAMMO BI INCL CAD: CPT | Mod: TC,PN

## 2021-08-13 PROCEDURE — 77063 MAMMO DIGITAL SCREENING BILAT WITH TOMO: ICD-10-PCS | Mod: 26,,, | Performed by: RADIOLOGY

## 2021-08-13 PROCEDURE — 77067 MAMMO DIGITAL SCREENING BILAT WITH TOMO: ICD-10-PCS | Mod: 26,,, | Performed by: RADIOLOGY

## 2021-08-13 PROCEDURE — 77067 SCR MAMMO BI INCL CAD: CPT | Mod: 26,,, | Performed by: RADIOLOGY

## 2021-08-13 PROCEDURE — 77063 BREAST TOMOSYNTHESIS BI: CPT | Mod: 26,,, | Performed by: RADIOLOGY

## 2021-09-07 ENCOUNTER — TELEPHONE (OUTPATIENT)
Dept: ENDOSCOPY | Facility: HOSPITAL | Age: 51
End: 2021-09-07

## 2021-09-14 ENCOUNTER — OFFICE VISIT (OUTPATIENT)
Dept: GASTROENTEROLOGY | Facility: CLINIC | Age: 51
End: 2021-09-14
Payer: COMMERCIAL

## 2021-09-14 VITALS
BODY MASS INDEX: 26.29 KG/M2 | HEIGHT: 63 IN | HEART RATE: 81 BPM | DIASTOLIC BLOOD PRESSURE: 77 MMHG | SYSTOLIC BLOOD PRESSURE: 131 MMHG | WEIGHT: 148.38 LBS

## 2021-09-14 DIAGNOSIS — K85.00 IDIOPATHIC ACUTE PANCREATITIS WITHOUT INFECTION OR NECROSIS: ICD-10-CM

## 2021-09-14 PROCEDURE — 3008F BODY MASS INDEX DOCD: CPT | Mod: CPTII,S$GLB,, | Performed by: INTERNAL MEDICINE

## 2021-09-14 PROCEDURE — 99999 PR PBB SHADOW E&M-EST. PATIENT-LVL III: CPT | Mod: PBBFAC,,, | Performed by: INTERNAL MEDICINE

## 2021-09-14 PROCEDURE — 3075F SYST BP GE 130 - 139MM HG: CPT | Mod: CPTII,S$GLB,, | Performed by: INTERNAL MEDICINE

## 2021-09-14 PROCEDURE — 3075F PR MOST RECENT SYSTOLIC BLOOD PRESS GE 130-139MM HG: ICD-10-PCS | Mod: CPTII,S$GLB,, | Performed by: INTERNAL MEDICINE

## 2021-09-14 PROCEDURE — 3078F PR MOST RECENT DIASTOLIC BLOOD PRESSURE < 80 MM HG: ICD-10-PCS | Mod: CPTII,S$GLB,, | Performed by: INTERNAL MEDICINE

## 2021-09-14 PROCEDURE — 3078F DIAST BP <80 MM HG: CPT | Mod: CPTII,S$GLB,, | Performed by: INTERNAL MEDICINE

## 2021-09-14 PROCEDURE — 99214 OFFICE O/P EST MOD 30 MIN: CPT | Mod: S$GLB,,, | Performed by: INTERNAL MEDICINE

## 2021-09-14 PROCEDURE — 3008F PR BODY MASS INDEX (BMI) DOCUMENTED: ICD-10-PCS | Mod: CPTII,S$GLB,, | Performed by: INTERNAL MEDICINE

## 2021-09-14 PROCEDURE — 99214 PR OFFICE/OUTPT VISIT, EST, LEVL IV, 30-39 MIN: ICD-10-PCS | Mod: S$GLB,,, | Performed by: INTERNAL MEDICINE

## 2021-09-14 PROCEDURE — 99999 PR PBB SHADOW E&M-EST. PATIENT-LVL III: ICD-10-PCS | Mod: PBBFAC,,, | Performed by: INTERNAL MEDICINE

## 2021-09-14 RX ORDER — VENLAFAXINE HYDROCHLORIDE 37.5 MG/1
37.5 CAPSULE, EXTENDED RELEASE ORAL DAILY
COMMUNITY
Start: 2021-07-03 | End: 2022-03-31

## 2021-09-17 ENCOUNTER — TELEPHONE (OUTPATIENT)
Dept: GASTROENTEROLOGY | Facility: CLINIC | Age: 51
End: 2021-09-17

## 2021-09-24 ENCOUNTER — HOSPITAL ENCOUNTER (OUTPATIENT)
Dept: RADIOLOGY | Facility: HOSPITAL | Age: 51
Discharge: HOME | End: 2021-09-24
Attending: INTERNAL MEDICINE
Payer: COMMERCIAL

## 2021-09-24 ENCOUNTER — TELEPHONE (OUTPATIENT)
Dept: GASTROENTEROLOGY | Facility: CLINIC | Age: 51
End: 2021-09-24

## 2021-09-24 DIAGNOSIS — K85.00 IDIOPATHIC ACUTE PANCREATITIS WITHOUT INFECTION OR NECROSIS: ICD-10-CM

## 2021-09-24 PROCEDURE — 76376 3D RENDER W/INTRP POSTPROCES: CPT | Mod: 26,,, | Performed by: STUDENT IN AN ORGANIZED HEALTH CARE EDUCATION/TRAINING PROGRAM

## 2021-09-24 PROCEDURE — 74181 MRI ABDOMEN WITHOUT CONTRAST MRCP: ICD-10-PCS | Mod: 26,,, | Performed by: STUDENT IN AN ORGANIZED HEALTH CARE EDUCATION/TRAINING PROGRAM

## 2021-09-24 PROCEDURE — 74181 MRI ABDOMEN W/O CONTRAST: CPT | Mod: 26,,, | Performed by: STUDENT IN AN ORGANIZED HEALTH CARE EDUCATION/TRAINING PROGRAM

## 2021-09-24 PROCEDURE — 76376 MRI ABDOMEN WITHOUT CONTRAST MRCP: ICD-10-PCS | Mod: 26,,, | Performed by: STUDENT IN AN ORGANIZED HEALTH CARE EDUCATION/TRAINING PROGRAM

## 2021-09-24 PROCEDURE — 76376 3D RENDER W/INTRP POSTPROCES: CPT | Mod: TC

## 2021-09-29 ENCOUNTER — TELEPHONE (OUTPATIENT)
Dept: ENDOSCOPY | Facility: HOSPITAL | Age: 51
End: 2021-09-29

## 2021-09-29 ENCOUNTER — PATIENT MESSAGE (OUTPATIENT)
Dept: ENDOSCOPY | Facility: HOSPITAL | Age: 51
End: 2021-09-29

## 2021-09-29 DIAGNOSIS — K85.90 PANCREATITIS: ICD-10-CM

## 2021-10-05 ENCOUNTER — TELEPHONE (OUTPATIENT)
Dept: ENDOSCOPY | Facility: HOSPITAL | Age: 51
End: 2021-10-05

## 2021-10-05 ENCOUNTER — PATIENT MESSAGE (OUTPATIENT)
Dept: ENDOSCOPY | Facility: HOSPITAL | Age: 51
End: 2021-10-05

## 2021-10-05 ENCOUNTER — PATIENT MESSAGE (OUTPATIENT)
Dept: ADMINISTRATIVE | Facility: HOSPITAL | Age: 51
End: 2021-10-05

## 2021-10-05 DIAGNOSIS — Z01.818 PRE-OP TESTING: ICD-10-CM

## 2021-10-06 DIAGNOSIS — E11.9 TYPE 2 DIABETES MELLITUS WITHOUT COMPLICATION, UNSPECIFIED WHETHER LONG TERM INSULIN USE: ICD-10-CM

## 2021-10-13 DIAGNOSIS — E55.9 VITAMIN D DEFICIENCY: ICD-10-CM

## 2021-10-13 DIAGNOSIS — E78.5 HYPERLIPIDEMIA DUE TO TYPE 2 DIABETES MELLITUS: ICD-10-CM

## 2021-10-13 DIAGNOSIS — J32.0 CHRONIC MAXILLARY SINUSITIS: ICD-10-CM

## 2021-10-13 DIAGNOSIS — E11.69 HYPERLIPIDEMIA DUE TO TYPE 2 DIABETES MELLITUS: ICD-10-CM

## 2021-10-13 DIAGNOSIS — I10 ESSENTIAL HYPERTENSION: ICD-10-CM

## 2021-10-13 DIAGNOSIS — E11.9 TYPE 2 DIABETES MELLITUS WITHOUT COMPLICATION, WITHOUT LONG-TERM CURRENT USE OF INSULIN: ICD-10-CM

## 2021-10-13 DIAGNOSIS — K21.9 GASTROESOPHAGEAL REFLUX DISEASE WITHOUT ESOPHAGITIS: ICD-10-CM

## 2021-10-13 RX ORDER — FLUTICASONE PROPIONATE 50 MCG
2 SPRAY, SUSPENSION (ML) NASAL 2 TIMES DAILY PRN
Qty: 16 G | Refills: 11 | Status: SHIPPED | OUTPATIENT
Start: 2021-10-13 | End: 2023-01-23

## 2021-10-13 RX ORDER — ERGOCALCIFEROL 1.25 MG/1
50000 CAPSULE ORAL
Qty: 12 CAPSULE | Refills: 3 | Status: SHIPPED | OUTPATIENT
Start: 2021-10-13 | End: 2023-11-26 | Stop reason: SDUPTHER

## 2021-10-13 RX ORDER — METFORMIN HYDROCHLORIDE 500 MG/1
500 TABLET, EXTENDED RELEASE ORAL 2 TIMES DAILY
Qty: 180 TABLET | Refills: 3 | Status: SHIPPED | OUTPATIENT
Start: 2021-10-13 | End: 2022-11-16 | Stop reason: SDUPTHER

## 2021-10-13 RX ORDER — AMLODIPINE BESYLATE 10 MG/1
10 TABLET ORAL DAILY
Qty: 90 TABLET | Refills: 3 | Status: SHIPPED | OUTPATIENT
Start: 2021-10-13 | End: 2022-04-12

## 2021-10-13 RX ORDER — CETIRIZINE HYDROCHLORIDE 10 MG/1
10 TABLET ORAL DAILY PRN
Qty: 90 TABLET | Refills: 3 | Status: SHIPPED | OUTPATIENT
Start: 2021-10-13 | End: 2022-02-07

## 2021-10-13 RX ORDER — PANTOPRAZOLE SODIUM 40 MG/1
40 TABLET, DELAYED RELEASE ORAL DAILY
Qty: 90 TABLET | Refills: 3 | OUTPATIENT
Start: 2021-10-13

## 2021-10-13 RX ORDER — ATORVASTATIN CALCIUM 10 MG/1
10 TABLET, FILM COATED ORAL NIGHTLY
Qty: 90 TABLET | Refills: 3 | Status: SHIPPED | OUTPATIENT
Start: 2021-10-13 | End: 2022-07-18

## 2021-10-14 ENCOUNTER — ANESTHESIA EVENT (OUTPATIENT)
Dept: ENDOSCOPY | Facility: HOSPITAL | Age: 51
End: 2021-10-14
Payer: COMMERCIAL

## 2021-10-15 ENCOUNTER — HOSPITAL ENCOUNTER (OUTPATIENT)
Facility: HOSPITAL | Age: 51
Discharge: HOME | End: 2021-10-15
Attending: INTERNAL MEDICINE | Admitting: INTERNAL MEDICINE
Payer: COMMERCIAL

## 2021-10-15 ENCOUNTER — ANESTHESIA (OUTPATIENT)
Dept: ENDOSCOPY | Facility: HOSPITAL | Age: 51
End: 2021-10-15
Payer: COMMERCIAL

## 2021-10-15 VITALS
TEMPERATURE: 98 F | OXYGEN SATURATION: 96 % | DIASTOLIC BLOOD PRESSURE: 73 MMHG | RESPIRATION RATE: 10 BRPM | HEIGHT: 63 IN | WEIGHT: 147 LBS | HEART RATE: 70 BPM | BODY MASS INDEX: 26.05 KG/M2 | SYSTOLIC BLOOD PRESSURE: 132 MMHG

## 2021-10-15 DIAGNOSIS — R93.3 ABNORMAL FINDING ON GI TRACT IMAGING: ICD-10-CM

## 2021-10-15 LAB
B-HCG UR QL: NEGATIVE
CTP QC/QA: YES
POCT GLUCOSE: 122 MG/DL (ref 70–110)
POCT GLUCOSE: 96 MG/DL (ref 70–110)

## 2021-10-15 PROCEDURE — 88173 PR  INTERPRETATION OF FNA SMEAR: ICD-10-PCS | Mod: 26,,, | Performed by: PATHOLOGY

## 2021-10-15 PROCEDURE — 82962 GLUCOSE BLOOD TEST: CPT | Performed by: INTERNAL MEDICINE

## 2021-10-15 PROCEDURE — 88173 CYTOPATH EVAL FNA REPORT: CPT | Mod: 26,,, | Performed by: PATHOLOGY

## 2021-10-15 PROCEDURE — 37000009 HC ANESTHESIA EA ADD 15 MINS: Performed by: INTERNAL MEDICINE

## 2021-10-15 PROCEDURE — 88305 TISSUE EXAM BY PATHOLOGIST: ICD-10-PCS | Mod: 26,,, | Performed by: PATHOLOGY

## 2021-10-15 PROCEDURE — D9220A PRA ANESTHESIA: ICD-10-PCS | Mod: ANES,,, | Performed by: STUDENT IN AN ORGANIZED HEALTH CARE EDUCATION/TRAINING PROGRAM

## 2021-10-15 PROCEDURE — 88305 TISSUE EXAM BY PATHOLOGIST: CPT | Performed by: PATHOLOGY

## 2021-10-15 PROCEDURE — 63600175 PHARM REV CODE 636 W HCPCS: Performed by: STUDENT IN AN ORGANIZED HEALTH CARE EDUCATION/TRAINING PROGRAM

## 2021-10-15 PROCEDURE — 25000003 PHARM REV CODE 250: Performed by: STUDENT IN AN ORGANIZED HEALTH CARE EDUCATION/TRAINING PROGRAM

## 2021-10-15 PROCEDURE — 43242 EGD US FINE NEEDLE BX/ASPIR: CPT | Performed by: INTERNAL MEDICINE

## 2021-10-15 PROCEDURE — 81025 URINE PREGNANCY TEST: CPT | Performed by: INTERNAL MEDICINE

## 2021-10-15 PROCEDURE — D9220A PRA ANESTHESIA: ICD-10-PCS | Mod: CRNA,,, | Performed by: STUDENT IN AN ORGANIZED HEALTH CARE EDUCATION/TRAINING PROGRAM

## 2021-10-15 PROCEDURE — 37000008 HC ANESTHESIA 1ST 15 MINUTES: Performed by: INTERNAL MEDICINE

## 2021-10-15 PROCEDURE — 43242 PR UPGI ENDOSCOPY,FN NEEDLE BX,GUIDED: ICD-10-PCS | Mod: ,,, | Performed by: INTERNAL MEDICINE

## 2021-10-15 PROCEDURE — 27202131 HC NEEDLE, FNB SINGLE (ANY): Performed by: INTERNAL MEDICINE

## 2021-10-15 PROCEDURE — 43242 EGD US FINE NEEDLE BX/ASPIR: CPT | Mod: ,,, | Performed by: INTERNAL MEDICINE

## 2021-10-15 PROCEDURE — D9220A PRA ANESTHESIA: Mod: CRNA,,, | Performed by: STUDENT IN AN ORGANIZED HEALTH CARE EDUCATION/TRAINING PROGRAM

## 2021-10-15 PROCEDURE — 88305 TISSUE EXAM BY PATHOLOGIST: CPT | Mod: 26,,, | Performed by: PATHOLOGY

## 2021-10-15 PROCEDURE — 88173 CYTOPATH EVAL FNA REPORT: CPT | Performed by: PATHOLOGY

## 2021-10-15 PROCEDURE — D9220A PRA ANESTHESIA: Mod: ANES,,, | Performed by: STUDENT IN AN ORGANIZED HEALTH CARE EDUCATION/TRAINING PROGRAM

## 2021-10-15 RX ORDER — FENTANYL CITRATE 50 UG/ML
25 INJECTION, SOLUTION INTRAMUSCULAR; INTRAVENOUS EVERY 5 MIN PRN
Status: DISCONTINUED | OUTPATIENT
Start: 2021-10-15 | End: 2021-10-15 | Stop reason: HOSPADM

## 2021-10-15 RX ORDER — LIDOCAINE HYDROCHLORIDE 20 MG/ML
INJECTION, SOLUTION EPIDURAL; INFILTRATION; INTRACAUDAL; PERINEURAL
Status: DISCONTINUED | OUTPATIENT
Start: 2021-10-15 | End: 2021-10-15

## 2021-10-15 RX ORDER — MIDAZOLAM HYDROCHLORIDE 1 MG/ML
INJECTION, SOLUTION INTRAMUSCULAR; INTRAVENOUS
Status: DISCONTINUED | OUTPATIENT
Start: 2021-10-15 | End: 2021-10-15

## 2021-10-15 RX ORDER — ONDANSETRON 2 MG/ML
INJECTION INTRAMUSCULAR; INTRAVENOUS
Status: DISCONTINUED | OUTPATIENT
Start: 2021-10-15 | End: 2021-10-15

## 2021-10-15 RX ORDER — SODIUM CHLORIDE 9 MG/ML
INJECTION, SOLUTION INTRAVENOUS CONTINUOUS
Status: DISCONTINUED | OUTPATIENT
Start: 2021-10-15 | End: 2021-10-15 | Stop reason: HOSPADM

## 2021-10-15 RX ORDER — SCOLOPAMINE TRANSDERMAL SYSTEM 1 MG/1
1 PATCH, EXTENDED RELEASE TRANSDERMAL
Status: DISCONTINUED | OUTPATIENT
Start: 2021-10-15 | End: 2021-10-15 | Stop reason: HOSPADM

## 2021-10-15 RX ORDER — PROPOFOL 10 MG/ML
VIAL (ML) INTRAVENOUS CONTINUOUS PRN
Status: DISCONTINUED | OUTPATIENT
Start: 2021-10-15 | End: 2021-10-15

## 2021-10-15 RX ORDER — SODIUM CHLORIDE 0.9 % (FLUSH) 0.9 %
10 SYRINGE (ML) INJECTION
Status: DISCONTINUED | OUTPATIENT
Start: 2021-10-15 | End: 2021-10-15 | Stop reason: HOSPADM

## 2021-10-15 RX ORDER — FENTANYL CITRATE 50 UG/ML
INJECTION, SOLUTION INTRAMUSCULAR; INTRAVENOUS
Status: DISCONTINUED | OUTPATIENT
Start: 2021-10-15 | End: 2021-10-15

## 2021-10-15 RX ORDER — PROPOFOL 10 MG/ML
VIAL (ML) INTRAVENOUS
Status: DISCONTINUED | OUTPATIENT
Start: 2021-10-15 | End: 2021-10-15

## 2021-10-15 RX ORDER — PROCHLORPERAZINE EDISYLATE 5 MG/ML
5 INJECTION INTRAMUSCULAR; INTRAVENOUS EVERY 30 MIN PRN
Status: DISCONTINUED | OUTPATIENT
Start: 2021-10-15 | End: 2021-10-15 | Stop reason: HOSPADM

## 2021-10-15 RX ORDER — FAMOTIDINE 10 MG/ML
INJECTION INTRAVENOUS
Status: DISCONTINUED | OUTPATIENT
Start: 2021-10-15 | End: 2021-10-15

## 2021-10-15 RX ADMIN — FENTANYL CITRATE 25 MCG: 50 INJECTION INTRAMUSCULAR; INTRAVENOUS at 12:10

## 2021-10-15 RX ADMIN — PROPOFOL 30 MG: 10 INJECTION, EMULSION INTRAVENOUS at 12:10

## 2021-10-15 RX ADMIN — Medication 100 MCG/KG/MIN: at 12:10

## 2021-10-15 RX ADMIN — SODIUM CHLORIDE: 9 INJECTION, SOLUTION INTRAVENOUS at 12:10

## 2021-10-15 RX ADMIN — PROPOFOL 20 MG: 10 INJECTION, EMULSION INTRAVENOUS at 12:10

## 2021-10-15 RX ADMIN — SCOPALAMINE 1 PATCH: 1 PATCH, EXTENDED RELEASE TRANSDERMAL at 11:10

## 2021-10-15 RX ADMIN — ONDANSETRON 4 MG: 2 INJECTION INTRAMUSCULAR; INTRAVENOUS at 12:10

## 2021-10-15 RX ADMIN — LIDOCAINE HYDROCHLORIDE 60 MG: 20 INJECTION, SOLUTION EPIDURAL; INFILTRATION; INTRACAUDAL at 12:10

## 2021-10-15 RX ADMIN — MIDAZOLAM 2 MG: 1 INJECTION INTRAMUSCULAR; INTRAVENOUS at 12:10

## 2021-10-15 RX ADMIN — FAMOTIDINE 20 MG: 10 INJECTION INTRAVENOUS at 12:10

## 2021-10-15 RX ADMIN — FENTANYL CITRATE 50 MCG: 50 INJECTION INTRAMUSCULAR; INTRAVENOUS at 12:10

## 2021-10-15 RX ADMIN — GLYCOPYRROLATE 0.2 MG: 0.2 INJECTION, SOLUTION INTRAMUSCULAR; INTRAVITREAL at 12:10

## 2021-10-20 ENCOUNTER — TELEPHONE (OUTPATIENT)
Dept: ENDOSCOPY | Facility: HOSPITAL | Age: 51
End: 2021-10-20

## 2021-10-21 LAB — FINAL PATHOLOGIC DIAGNOSIS: NORMAL

## 2021-10-27 ENCOUNTER — TELEPHONE (OUTPATIENT)
Dept: GASTROENTEROLOGY | Facility: CLINIC | Age: 51
End: 2021-10-27
Payer: COMMERCIAL

## 2021-11-02 ENCOUNTER — TELEPHONE (OUTPATIENT)
Dept: ENDOSCOPY | Facility: HOSPITAL | Age: 51
End: 2021-11-02
Payer: COMMERCIAL

## 2021-11-11 DIAGNOSIS — E11.9 TYPE 2 DIABETES MELLITUS WITHOUT COMPLICATION, WITHOUT LONG-TERM CURRENT USE OF INSULIN: ICD-10-CM

## 2022-02-01 ENCOUNTER — PATIENT OUTREACH (OUTPATIENT)
Dept: ADMINISTRATIVE | Facility: OTHER | Age: 52
End: 2022-02-01
Payer: COMMERCIAL

## 2022-02-01 NOTE — PROGRESS NOTES
LINKS immunization registry updated  Care Everywhere updated  Health Maintenance updated  Chart reviewed for overdue Proactive [facility] Encounters ([facility]) health maintenance testing (CRS, Breast Ca, Diabetic Eye Exam)   Orders entered:N/A

## 2022-02-16 DIAGNOSIS — E55.9 VITAMIN D DEFICIENCY: ICD-10-CM

## 2022-02-16 DIAGNOSIS — E11.9 TYPE 2 DIABETES MELLITUS WITHOUT COMPLICATION, WITHOUT LONG-TERM CURRENT USE OF INSULIN: ICD-10-CM

## 2022-02-16 NOTE — TELEPHONE ENCOUNTER
Care Due:                  Date            Visit Type   Department     Provider  --------------------------------------------------------------------------------                                EP -                              PRIMARY      [facility] PRIMARY  Last Visit: 01-      CARE ([facility])   [facility]           Zeynep Salgado                              MYCHART                              ANNUAL                              CHECKUP/PHY  [facility] PRIMARY  Next Visit: 03-      [facility]           Zeynep Salgado                                                            Last  Test          Frequency    Reason                     Performed    Due Date  --------------------------------------------------------------------------------    CMP.........  12 months..  atorvastatin, metFORMIN..  12- 11-    Lipid Panel.  12 months..  atorvastatin.............  12- 11-    Powered by [employer]. Reference number: [id number].   2/16/2022 1:28:01 PM CST

## 2022-02-16 NOTE — TELEPHONE ENCOUNTER
Please deny Rx requests. Prescriptions at the [facility] pharmacy. They are filling for the pt today.

## 2022-02-17 RX ORDER — ERGOCALCIFEROL 1.25 MG/1
CAPSULE ORAL
Qty: 12 CAPSULE | Refills: 3 | OUTPATIENT
Start: 2022-02-17

## 2022-02-17 RX ORDER — METFORMIN HYDROCHLORIDE 500 MG/1
TABLET, EXTENDED RELEASE ORAL
Qty: 90 TABLET | Refills: 3 | OUTPATIENT
Start: 2022-02-17

## 2022-03-23 DIAGNOSIS — K21.9 GASTROESOPHAGEAL REFLUX DISEASE WITHOUT ESOPHAGITIS: ICD-10-CM

## 2022-03-23 RX ORDER — PANTOPRAZOLE SODIUM 40 MG/1
TABLET, DELAYED RELEASE ORAL
Qty: 90 TABLET | Refills: 0 | Status: SHIPPED | OUTPATIENT
Start: 2022-03-23 | End: 2022-03-31 | Stop reason: SDUPTHER

## 2022-03-23 NOTE — TELEPHONE ENCOUNTER
No new care gaps identified.  Powered by [employer] by "[employer]. Reference number: [id number].   3/23/2022 11:00:36 AM CDT

## 2022-03-31 ENCOUNTER — PATIENT MESSAGE (OUTPATIENT)
Dept: BEHAVIORAL HEALTH | Facility: CLINIC | Age: 52
End: 2022-03-31
Payer: COMMERCIAL

## 2022-03-31 ENCOUNTER — OFFICE VISIT (OUTPATIENT)
Dept: PRIMARY CARE CLINIC | Facility: CLINIC | Age: 52
End: 2022-03-31
Payer: COMMERCIAL

## 2022-03-31 ENCOUNTER — TELEPHONE (OUTPATIENT)
Dept: BEHAVIORAL HEALTH | Facility: CLINIC | Age: 52
End: 2022-03-31
Payer: COMMERCIAL

## 2022-03-31 VITALS
HEIGHT: 63 IN | HEART RATE: 72 BPM | SYSTOLIC BLOOD PRESSURE: 134 MMHG | OXYGEN SATURATION: 99 % | DIASTOLIC BLOOD PRESSURE: 74 MMHG | WEIGHT: 153.25 LBS | BODY MASS INDEX: 27.15 KG/M2 | TEMPERATURE: 99 F

## 2022-03-31 DIAGNOSIS — N95.1 PERIMENOPAUSAL VASOMOTOR SYMPTOMS: ICD-10-CM

## 2022-03-31 DIAGNOSIS — K57.90 DIVERTICULOSIS: ICD-10-CM

## 2022-03-31 DIAGNOSIS — E11.9 DIABETIC EYE EXAM: ICD-10-CM

## 2022-03-31 DIAGNOSIS — Z00.00 ANNUAL PHYSICAL EXAM: Primary | ICD-10-CM

## 2022-03-31 DIAGNOSIS — E66.3 OVERWEIGHT (BMI 25.0-29.9): ICD-10-CM

## 2022-03-31 DIAGNOSIS — I10 ESSENTIAL HYPERTENSION: ICD-10-CM

## 2022-03-31 DIAGNOSIS — E78.5 HYPERLIPIDEMIA ASSOCIATED WITH TYPE 2 DIABETES MELLITUS: ICD-10-CM

## 2022-03-31 DIAGNOSIS — Z12.31 SCREENING MAMMOGRAM FOR BREAST CANCER: ICD-10-CM

## 2022-03-31 DIAGNOSIS — Z87.19 HISTORY OF PANCREATITIS: ICD-10-CM

## 2022-03-31 DIAGNOSIS — E55.9 VITAMIN D DEFICIENCY: ICD-10-CM

## 2022-03-31 DIAGNOSIS — E11.9 ENCOUNTER FOR DIABETIC FOOT EXAM: ICD-10-CM

## 2022-03-31 DIAGNOSIS — E11.9 TYPE 2 DIABETES MELLITUS WITHOUT COMPLICATION, WITHOUT LONG-TERM CURRENT USE OF INSULIN: ICD-10-CM

## 2022-03-31 DIAGNOSIS — R53.82 CHRONIC FATIGUE: ICD-10-CM

## 2022-03-31 DIAGNOSIS — K21.9 GASTROESOPHAGEAL REFLUX DISEASE WITHOUT ESOPHAGITIS: ICD-10-CM

## 2022-03-31 DIAGNOSIS — E11.69 HYPERLIPIDEMIA ASSOCIATED WITH TYPE 2 DIABETES MELLITUS: ICD-10-CM

## 2022-03-31 DIAGNOSIS — Z01.00 DIABETIC EYE EXAM: ICD-10-CM

## 2022-03-31 DIAGNOSIS — Z11.4 SCREENING FOR HIV (HUMAN IMMUNODEFICIENCY VIRUS): ICD-10-CM

## 2022-03-31 DIAGNOSIS — F39 MOOD DISORDER: ICD-10-CM

## 2022-03-31 DIAGNOSIS — Z90.49 S/P CHOLECYSTECTOMY: ICD-10-CM

## 2022-03-31 PROCEDURE — 3075F PR MOST RECENT SYSTOLIC BLOOD PRESS GE 130-139MM HG: ICD-10-PCS | Mod: CPTII,S$GLB,, | Performed by: FAMILY MEDICINE

## 2022-03-31 PROCEDURE — 99396 PR PREVENTIVE VISIT,EST,40-64: ICD-10-PCS | Mod: S$GLB,,, | Performed by: FAMILY MEDICINE

## 2022-03-31 PROCEDURE — 82570 ASSAY OF URINE CREATININE: CPT | Performed by: FAMILY MEDICINE

## 2022-03-31 PROCEDURE — 81001 URINALYSIS AUTO W/SCOPE: CPT | Performed by: FAMILY MEDICINE

## 2022-03-31 PROCEDURE — 3072F LOW RISK FOR RETINOPATHY: CPT | Mod: CPTII,S$GLB,, | Performed by: FAMILY MEDICINE

## 2022-03-31 PROCEDURE — 3078F PR MOST RECENT DIASTOLIC BLOOD PRESSURE < 80 MM HG: ICD-10-PCS | Mod: CPTII,S$GLB,, | Performed by: FAMILY MEDICINE

## 2022-03-31 PROCEDURE — 1159F PR MEDICATION LIST DOCUMENTED IN MEDICAL RECORD: ICD-10-PCS | Mod: CPTII,S$GLB,, | Performed by: FAMILY MEDICINE

## 2022-03-31 PROCEDURE — 3078F DIAST BP <80 MM HG: CPT | Mod: CPTII,S$GLB,, | Performed by: FAMILY MEDICINE

## 2022-03-31 PROCEDURE — 99396 PREV VISIT EST AGE 40-64: CPT | Mod: S$GLB,,, | Performed by: FAMILY MEDICINE

## 2022-03-31 PROCEDURE — 82043 UR ALBUMIN QUANTITATIVE: CPT | Performed by: FAMILY MEDICINE

## 2022-03-31 PROCEDURE — 3008F BODY MASS INDEX DOCD: CPT | Mod: CPTII,S$GLB,, | Performed by: FAMILY MEDICINE

## 2022-03-31 PROCEDURE — 1160F PR REVIEW ALL MEDS BY PRESCRIBER/CLIN PHARMACIST DOCUMENTED: ICD-10-PCS | Mod: CPTII,S$GLB,, | Performed by: FAMILY MEDICINE

## 2022-03-31 PROCEDURE — 3072F PR LOW RISK FOR RETINOPATHY: ICD-10-PCS | Mod: CPTII,S$GLB,, | Performed by: FAMILY MEDICINE

## 2022-03-31 PROCEDURE — 87186 SC STD MICRODIL/AGAR DIL: CPT | Performed by: FAMILY MEDICINE

## 2022-03-31 PROCEDURE — 1160F RVW MEDS BY RX/DR IN RCRD: CPT | Mod: CPTII,S$GLB,, | Performed by: FAMILY MEDICINE

## 2022-03-31 PROCEDURE — 3008F PR BODY MASS INDEX (BMI) DOCUMENTED: ICD-10-PCS | Mod: CPTII,S$GLB,, | Performed by: FAMILY MEDICINE

## 2022-03-31 PROCEDURE — 87088 URINE BACTERIA CULTURE: CPT | Performed by: FAMILY MEDICINE

## 2022-03-31 PROCEDURE — 99999 PR PBB SHADOW E&M-EST. PATIENT-LVL V: CPT | Mod: PBBFAC,,, | Performed by: FAMILY MEDICINE

## 2022-03-31 PROCEDURE — 99999 PR PBB SHADOW E&M-EST. PATIENT-LVL V: ICD-10-PCS | Mod: PBBFAC,,, | Performed by: FAMILY MEDICINE

## 2022-03-31 PROCEDURE — 87077 CULTURE AEROBIC IDENTIFY: CPT | Performed by: FAMILY MEDICINE

## 2022-03-31 PROCEDURE — 87086 URINE CULTURE/COLONY COUNT: CPT | Performed by: FAMILY MEDICINE

## 2022-03-31 PROCEDURE — 3075F SYST BP GE 130 - 139MM HG: CPT | Mod: CPTII,S$GLB,, | Performed by: FAMILY MEDICINE

## 2022-03-31 PROCEDURE — 1159F MED LIST DOCD IN RCRD: CPT | Mod: CPTII,S$GLB,, | Performed by: FAMILY MEDICINE

## 2022-03-31 RX ORDER — DICYCLOMINE HYDROCHLORIDE 10 MG/1
10 CAPSULE ORAL
COMMUNITY
Start: 2021-09-07 | End: 2022-11-16 | Stop reason: SDUPTHER

## 2022-03-31 RX ORDER — IBUPROFEN 800 MG/1
800 TABLET ORAL EVERY 6 HOURS PRN
COMMUNITY
Start: 2022-02-22 | End: 2022-08-23 | Stop reason: ALTCHOICE

## 2022-03-31 RX ORDER — AMOXICILLIN 875 MG/1
875 TABLET, FILM COATED ORAL 2 TIMES DAILY
COMMUNITY
Start: 2022-01-12 | End: 2022-08-23

## 2022-03-31 RX ORDER — TRAMADOL HYDROCHLORIDE 50 MG/1
50 TABLET ORAL
COMMUNITY
Start: 2022-02-23 | End: 2022-08-23

## 2022-03-31 RX ORDER — VENLAFAXINE HYDROCHLORIDE 75 MG/1
75 CAPSULE, EXTENDED RELEASE ORAL DAILY
Qty: 90 CAPSULE | Refills: 3 | Status: SHIPPED | OUTPATIENT
Start: 2022-03-31 | End: 2023-01-23

## 2022-03-31 RX ORDER — PANTOPRAZOLE SODIUM 40 MG/1
40 TABLET, DELAYED RELEASE ORAL DAILY
Qty: 90 TABLET | Refills: 3 | Status: SHIPPED | OUTPATIENT
Start: 2022-03-31 | End: 2022-10-03 | Stop reason: SDUPTHER

## 2022-03-31 RX ORDER — AMOXICILLIN 250 MG
1 CAPSULE ORAL 2 TIMES DAILY PRN
COMMUNITY
Start: 2021-09-07 | End: 2023-11-21

## 2022-03-31 RX ORDER — PREDNISONE 20 MG/1
20 TABLET ORAL 2 TIMES DAILY
COMMUNITY
Start: 2022-01-12 | End: 2022-08-23

## 2022-03-31 RX ORDER — CHLORHEXIDINE GLUCONATE ORAL RINSE 1.2 MG/ML
SOLUTION DENTAL
COMMUNITY
Start: 2022-02-23 | End: 2023-01-23

## 2022-03-31 RX ORDER — AMOXICILLIN 500 MG/1
500 CAPSULE ORAL 3 TIMES DAILY
COMMUNITY
Start: 2022-02-22 | End: 2022-08-23

## 2022-03-31 RX ORDER — PENICILLIN V POTASSIUM 500 MG/1
500 TABLET, FILM COATED ORAL EVERY 6 HOURS
COMMUNITY
Start: 2022-02-23 | End: 2022-08-23

## 2022-03-31 RX ORDER — OXYCODONE AND ACETAMINOPHEN 5; 325 MG/1; MG/1
1 TABLET ORAL EVERY 4 HOURS PRN
COMMUNITY
Start: 2021-09-07 | End: 2022-08-23

## 2022-03-31 RX ORDER — ACETAMINOPHEN AND CODEINE PHOSPHATE 300; 30 MG/1; MG/1
1 TABLET ORAL EVERY 6 HOURS PRN
COMMUNITY
Start: 2022-01-19 | End: 2022-08-23

## 2022-03-31 NOTE — PROGRESS NOTES
Subjective:       Patient ID: Joan Rizvi is a 51 y.o. female.    Chief Complaint: Annual Exam      50 yo female with diabetes, hypertension, hyperlipidemia, GERD, vitamin D deficiency, hx of pancreatitis, diverticulosis, and overweight with BMI 27; s/p cholecystectomy presents for annual physical.    She reports fatigue. It would soon be 1 year since her last menstrual period. She notes stressors and difficulty sleeping. She gets hot flashes. She is on Effexor 37.5 mg daily. Not on hormone replacement therapy.    Her blood pressure is on target.    She is able to tolerate her cholesterol medication.    She has been making healthy lifestyle changes through diet and exercise.     Colonoscopy on 3/18/2021    Lipid disorders/ASCVD risk (ages >/= 45 or >/= 20 if increased risk ): Ordered  DM (>45y yearly or if obese, HTN): Ordered  Breast Cancer (40-50y discretion of pt, 50-74y every 1-2 years): Due in August, 2022  Cervical Cancer (Pap Smear ages 21-65 every 3 years or Pap + HPV q5 years after 30 years of age): Up to date      The following portions of the patient's history were reviewed and updated as appropriate: allergies, current medications, past family history, past medical history, past social history, past surgical history and problem list.        Review of Systems   Constitutional: Positive for fatigue. Negative for activity change, appetite change, chills, diaphoresis, fever and unexpected weight change.   HENT: Negative for nasal congestion, dental problem, facial swelling, hearing loss, nosebleeds, postnasal drip, rhinorrhea, sore throat, tinnitus and trouble swallowing.    Eyes: Negative for pain, discharge, itching and visual disturbance.   Respiratory: Negative for apnea, chest tightness, shortness of breath, wheezing and stridor.    Cardiovascular: Negative for chest pain, palpitations and leg swelling.   Gastrointestinal: Negative for abdominal distention, abdominal pain, blood in stool, change  "in bowel habit, nausea, rectal pain, vomiting and change in bowel habit.   Endocrine: Negative for cold intolerance, heat intolerance, polydipsia and polyuria.   Genitourinary: Positive for hot flashes. Negative for difficulty urinating, dysuria, frequency, hematuria, menstrual problem and urgency.   Musculoskeletal: Negative for arthralgias, gait problem, joint swelling, myalgias, neck pain and neck stiffness.   Integumentary:  Negative for color change and rash.   Neurological: Negative for dizziness, tremors, seizures, syncope, facial asymmetry, weakness and headaches.   Hematological: Negative for adenopathy. Does not bruise/bleed easily.   Psychiatric/Behavioral: Positive for dysphoric mood. Negative for agitation, confusion, hallucinations, self-injury and suicidal ideas. The patient is not hyperactive.           Objective:       Vitals:    03/31/22 1427   BP: 134/74   Pulse: 72   Temp: 98.6 °F (37 °C)   TempSrc: Oral   SpO2: 99%   Weight: 69.5 kg (153 lb 3.5 oz)   Height: 5' 3" (1.6 m)     Physical Exam  Constitutional:       General: She is not in acute distress.     Appearance: She is well-developed. She is not diaphoretic.   HENT:      Head: Normocephalic and atraumatic.      Right Ear: External ear normal.      Left Ear: External ear normal.   Eyes:      General: No scleral icterus.        Right eye: No discharge.         Left eye: No discharge.      Conjunctiva/sclera: Conjunctivae normal.      Pupils: Pupils are equal, round, and reactive to light.   Neck:      Thyroid: No thyromegaly.   Cardiovascular:      Rate and Rhythm: Normal rate and regular rhythm.      Pulses:           Dorsalis pedis pulses are 2+ on the right side and 2+ on the left side.        Posterior tibial pulses are 2+ on the right side and 2+ on the left side.      Heart sounds: Normal heart sounds. No murmur heard.    No friction rub. No gallop.   Pulmonary:      Effort: Pulmonary effort is normal. No respiratory distress.      " Breath sounds: Normal breath sounds.   Chest:      Chest wall: No tenderness.   Abdominal:      General: Bowel sounds are normal. There is no distension.      Palpations: Abdomen is soft. There is no mass.      Tenderness: There is no abdominal tenderness. There is no guarding or rebound.   Musculoskeletal:         General: Normal range of motion.      Cervical back: Normal range of motion and neck supple.      Right foot: Normal range of motion. No deformity, bunion, Charcot foot, foot drop or prominent metatarsal heads.      Left foot: Normal range of motion. No deformity, bunion, Charcot foot, foot drop or prominent metatarsal heads.   Feet:      Right foot:      Protective Sensation: 5 sites tested. 5 sites sensed.      Skin integrity: No ulcer, blister, skin breakdown, erythema, warmth, callus, dry skin or fissure.      Left foot:      Protective Sensation: 5 sites tested. 5 sites sensed.      Skin integrity: No ulcer, blister, skin breakdown, erythema, warmth, callus, dry skin or fissure.      Comments: Polish on toes  Lymphadenopathy:      Cervical: No cervical adenopathy.   Skin:     General: Skin is warm.      Capillary Refill: Capillary refill takes less than 2 seconds.      Findings: No rash.   Neurological:      General: No focal deficit present.      Mental Status: She is alert and oriented to person, place, and time.      Cranial Nerves: No cranial nerve deficit.      Motor: No abnormal muscle tone.      Coordination: Coordination normal.      Deep Tendon Reflexes: Reflexes normal.   Psychiatric:         Thought Content: Thought content normal.         Judgment: Judgment normal.         Assessment:       1. Annual physical exam    2. Chronic fatigue    3. Mood disorder    4. Perimenopausal vasomotor symptoms    5. Essential hypertension    6. Type 2 diabetes mellitus without complication, without long-term current use of insulin    7. Diabetic eye exam    8. Encounter for diabetic foot exam    9.  Hyperlipidemia associated with type 2 diabetes mellitus    10. Overweight (BMI 25.0-29.9)    11. Gastroesophageal reflux disease without esophagitis    12. Vitamin D deficiency    13. History of pancreatitis    14. Diverticulosis    15. S/P cholecystectomy    16. Screening mammogram for breast cancer    17. Screening for HIV (human immunodeficiency virus)        Plan:       1. Annual physical exam  Age appropriate prevention guidelines implemented, unless patient refused  Encourage Advanced directives  Labs ordered   Immunizations reviewed. Encourage yearly influenza vaccine.  Patient Counseling:  --Nutrition: Encourage healthy food choices, adequate water intake, moderate sodium/caffeine intake, diet low in saturated fat and cholesterol. Importance of caloric balance and sufficient intake of fresh fruits, vegetables, fiber, calcium, iron, and 1 mg of folate supplement per day (for females capable of pregnancy).  --Exercise: Stressed the importance of regular exercise.   --Substance Abuse: Abstinence from tobacco products. No more than 2 alcoholic drinks per day in men or 1 alcoholic drink per day in women. Avoid illicit drugs, driving or other dangerous activities under the influence. In the event of abuse, treatment offered.   --Sexuality: Safe sex practices to avoid sexually transmitted diseases; careful partner selection, use of condoms and contraceptive alternatives, avoidance of unintended pregnancy in fertile women of child-bearing age  --Injury prevention: encourage safety belts, safety helmets, smoke detector.  --Dental health: Discussed importance of regular tooth brushing X2 daily, flossing X1 daily, and routine dental visits.  --Encourage use of sun screen, wear sun protective clothing  --Encourage routine eye exams    2. Chronic fatigue  -     Urinalysis  -     Urine culture  -     Iron and TIBC; Future  -     Ferritin; Future  -     Vitamin B12; Future    3. Mood disorder  -     venlafaxine  (EFFEXOR-XR) 75 MG 24 hr capsule; Take 1 capsule (75 mg total) by mouth once daily.  Dispense: 90 capsule; Refill: 3  -     Ambulatory referral/consult to Primary Care Behavioral Health (Non-Opioids); Future; Expected date: 04/07/2022  Increase effexor from 37.5 mg daily to 75 mg daily    4. Perimenopausal vasomotor symptoms  -     venlafaxine (EFFEXOR-XR) 75 MG 24 hr capsule; Take 1 capsule (75 mg total) by mouth once daily.  Dispense: 90 capsule; Refill: 3  Increase effexor from 37.5 mg daily to 75 mg daily    5. Essential hypertension  -     TSH; Future  -     CBC Auto Differential; Future  -     Comprehensive Metabolic Panel; Future  -     Microalbumin/Creatinine Ratio, Urine  On amlodipine    6. Type 2 diabetes mellitus without complication, without long-term current use of insulin  -     Hemoglobin A1C; Future  On Metformin    7. Diabetic eye exam  -     Ambulatory referral/consult to Optometry; Future; Expected date: 04/07/2022    8. Encounter for diabetic foot exam  Foot precautions advised.  No sensory deficit by monofilament testing.    9. Hyperlipidemia associated with type 2 diabetes mellitus  -     Lipid Panel; Future  On atorvastatin    10. Overweight (BMI 25.0-29.9)  The importance of optimum diet & exercise discussed. The goal for weight management is 5-10 % of total body weight reduction in 3 months.     11. Gastroesophageal reflux disease without esophagitis  -     pantoprazole (PROTONIX) 40 MG tablet; Take 1 tablet (40 mg total) by mouth once daily.  Dispense: 90 tablet; Refill: 3  On sucralfate    12. Vitamin D deficiency  On vitamin D    13. History of pancreatitis  NO GLP1 agonist    14. Diverticulosis  Asymptomatic. Up to date on colonoscopy.    15. S/P cholecystectomy  No adverse events    16. Screening mammogram for breast cancer  -     Mammo Digital Screening Bilat w/ Torsten; Future    17. Screening for HIV (human immunodeficiency virus)  -     HIV 1/2 Ag/Ab (4th Gen);  Future        Disclaimer: This note has been generated using voice-recognition software. There may be typographical errors that have been missed during proof-reading

## 2022-04-01 ENCOUNTER — PATIENT MESSAGE (OUTPATIENT)
Dept: BEHAVIORAL HEALTH | Facility: CLINIC | Age: 52
End: 2022-04-01
Payer: COMMERCIAL

## 2022-04-01 ENCOUNTER — TELEPHONE (OUTPATIENT)
Dept: BEHAVIORAL HEALTH | Facility: CLINIC | Age: 52
End: 2022-04-01
Payer: COMMERCIAL

## 2022-04-01 LAB
ALBUMIN/CREAT UR: 8.5 UG/MG (ref 0–30)
BACTERIA #/AREA URNS AUTO: NORMAL /HPF
BILIRUB UR QL STRIP: NEGATIVE
CLARITY UR REFRACT.AUTO: ABNORMAL
COLOR UR AUTO: YELLOW
CREAT UR-MCNC: 223 MG/DL (ref 15–325)
GLUCOSE UR QL STRIP: NEGATIVE
HGB UR QL STRIP: NEGATIVE
KETONES UR QL STRIP: NEGATIVE
LEUKOCYTE ESTERASE UR QL STRIP: NEGATIVE
MICROALBUMIN UR DL<=1MG/L-MCNC: 19 UG/ML
MICROSCOPIC COMMENT: NORMAL
NITRITE UR QL STRIP: NEGATIVE
PH UR STRIP: 6 [PH] (ref 5–8)
PROT UR QL STRIP: NEGATIVE
RBC #/AREA URNS AUTO: 1 /HPF (ref 0–4)
SP GR UR STRIP: 1.02 (ref 1–1.03)
URN SPEC COLLECT METH UR: ABNORMAL
WBC #/AREA URNS AUTO: 5 /HPF (ref 0–5)

## 2022-04-03 ENCOUNTER — TELEPHONE (OUTPATIENT)
Dept: PRIMARY CARE CLINIC | Facility: CLINIC | Age: 52
End: 2022-04-03
Payer: COMMERCIAL

## 2022-04-03 LAB — BACTERIA UR CULT: ABNORMAL

## 2022-04-03 RX ORDER — CIPROFLOXACIN 500 MG/1
500 TABLET ORAL EVERY 12 HOURS
Qty: 14 TABLET | Refills: 0 | Status: SHIPPED | OUTPATIENT
Start: 2022-04-03 | End: 2022-08-23

## 2022-04-04 ENCOUNTER — TELEPHONE (OUTPATIENT)
Dept: PRIMARY CARE CLINIC | Facility: CLINIC | Age: 52
End: 2022-04-04
Payer: COMMERCIAL

## 2022-04-04 NOTE — TELEPHONE ENCOUNTER
----- Message from Soha Abdi sent at 4/4/2022 10:29 AM CDT -----  Contact: [phone]  Patient is returning a phone call.  Who left a message for the patient: Obed Sinha MA   Does patient know what this is regarding:  lab results  Would you like a call back, or a response through your [facility] portal?:   phone  Comments:

## 2022-04-04 NOTE — TELEPHONE ENCOUNTER
----- Message from Zeynep Salgado MD sent at 4/3/2022  8:47 PM CDT -----  Please let patient know message sent to portal    Hello,    Need to treat urinary tract infection- klebsiella.    I sent a prescription for ciprofloxacin to your pharmacy. You do not have an allergy to ciprofloxacin. Avoid running activities for 1 week when taking ciprofloxacin.

## 2022-04-07 ENCOUNTER — TELEPHONE (OUTPATIENT)
Dept: BEHAVIORAL HEALTH | Facility: CLINIC | Age: 52
End: 2022-04-07
Payer: COMMERCIAL

## 2022-05-30 ENCOUNTER — PATIENT MESSAGE (OUTPATIENT)
Dept: ADMINISTRATIVE | Facility: HOSPITAL | Age: 52
End: 2022-05-30
Payer: COMMERCIAL

## 2022-06-17 ENCOUNTER — PATIENT MESSAGE (OUTPATIENT)
Dept: ADMINISTRATIVE | Facility: HOSPITAL | Age: 52
End: 2022-06-17
Payer: COMMERCIAL

## 2022-06-17 DIAGNOSIS — K21.9 GASTROESOPHAGEAL REFLUX DISEASE WITHOUT ESOPHAGITIS: ICD-10-CM

## 2022-06-17 DIAGNOSIS — J32.0 CHRONIC MAXILLARY SINUSITIS: ICD-10-CM

## 2022-06-17 NOTE — TELEPHONE ENCOUNTER
Unable to retrieve patient chart and identify care gaps.  [facility] Embedded Care Gaps. Reference number: [id number]. 6/17/2022   9:19:18 AM CDT

## 2022-06-21 RX ORDER — CETIRIZINE HYDROCHLORIDE 10 MG/1
TABLET ORAL
Qty: 90 TABLET | Refills: 3 | OUTPATIENT
Start: 2022-06-21

## 2022-06-21 RX ORDER — PANTOPRAZOLE SODIUM 40 MG/1
40 TABLET, DELAYED RELEASE ORAL DAILY
Qty: 90 TABLET | Refills: 3 | OUTPATIENT
Start: 2022-06-21

## 2022-07-05 DIAGNOSIS — I10 ESSENTIAL HYPERTENSION: ICD-10-CM

## 2022-07-05 DIAGNOSIS — K21.9 GASTROESOPHAGEAL REFLUX DISEASE WITHOUT ESOPHAGITIS: ICD-10-CM

## 2022-07-05 RX ORDER — PANTOPRAZOLE SODIUM 40 MG/1
40 TABLET, DELAYED RELEASE ORAL DAILY
Qty: 90 TABLET | Refills: 3 | Status: CANCELLED | OUTPATIENT
Start: 2022-07-05

## 2022-07-05 RX ORDER — AMLODIPINE BESYLATE 10 MG/1
10 TABLET ORAL DAILY
Qty: 90 TABLET | Refills: 3 | Status: CANCELLED | OUTPATIENT
Start: 2022-07-05

## 2022-07-05 NOTE — TELEPHONE ENCOUNTER
Care Due:                  Date            Visit Type   Department     Provider  --------------------------------------------------------------------------------                                MYCHART                              ANNUAL                              CHECKUP/PHY  [facility]  Last Visit: 03-      [facility]           Zeynep Salgado  Next Visit: None Scheduled  None         None Found                                                            Last  Test          Frequency    Reason                     Performed    Due Date  --------------------------------------------------------------------------------    CMP.........  12 months..  atorvastatin, metFORMIN,   12- 11-                             venlafaxine..............    HBA1C.......  6 months...  metFORMIN................  12- 06-    Lipid Panel.  12 months..  atorvastatin.............  12- 11-    [facility] Embedded Care Gaps. Reference number: [id number]. 7/05/2022   1:43:40 PM CDT

## 2022-07-15 ENCOUNTER — LAB VISIT (OUTPATIENT)
Dept: LAB | Facility: HOSPITAL | Age: 52
End: 2022-07-15
Attending: FAMILY MEDICINE
Payer: COMMERCIAL

## 2022-07-15 DIAGNOSIS — I10 ESSENTIAL HYPERTENSION: ICD-10-CM

## 2022-07-15 DIAGNOSIS — R53.82 CHRONIC FATIGUE: ICD-10-CM

## 2022-07-15 DIAGNOSIS — Z11.4 SCREENING FOR HIV (HUMAN IMMUNODEFICIENCY VIRUS): ICD-10-CM

## 2022-07-15 DIAGNOSIS — E11.9 TYPE 2 DIABETES MELLITUS WITHOUT COMPLICATION, WITHOUT LONG-TERM CURRENT USE OF INSULIN: ICD-10-CM

## 2022-07-15 DIAGNOSIS — E11.69 HYPERLIPIDEMIA ASSOCIATED WITH TYPE 2 DIABETES MELLITUS: ICD-10-CM

## 2022-07-15 DIAGNOSIS — E78.5 HYPERLIPIDEMIA ASSOCIATED WITH TYPE 2 DIABETES MELLITUS: ICD-10-CM

## 2022-07-15 LAB
ALBUMIN SERPL BCP-MCNC: 3.9 G/DL (ref 3.5–5.2)
ALP SERPL-CCNC: 105 U/L (ref 55–135)
ALT SERPL W/O P-5'-P-CCNC: 27 U/L (ref 10–44)
ANION GAP SERPL CALC-SCNC: 13 MMOL/L (ref 8–16)
AST SERPL-CCNC: 28 U/L (ref 10–40)
BASOPHILS # BLD AUTO: 0.06 K/UL (ref 0–0.2)
BASOPHILS NFR BLD: 1.9 % (ref 0–1.9)
BILIRUB SERPL-MCNC: 0.3 MG/DL (ref 0.1–1)
BUN SERPL-MCNC: 10 MG/DL (ref 6–20)
CALCIUM SERPL-MCNC: 10 MG/DL (ref 8.7–10.5)
CHLORIDE SERPL-SCNC: 104 MMOL/L (ref 95–110)
CHOLEST SERPL-MCNC: 252 MG/DL (ref 120–199)
CHOLEST/HDLC SERPL: 1.9 {RATIO} (ref 2–5)
CO2 SERPL-SCNC: 26 MMOL/L (ref 23–29)
CREAT SERPL-MCNC: 0.7 MG/DL (ref 0.5–1.4)
DIFFERENTIAL METHOD: ABNORMAL
EOSINOPHIL # BLD AUTO: 0.1 K/UL (ref 0–0.5)
EOSINOPHIL NFR BLD: 2.5 % (ref 0–8)
ERYTHROCYTE [DISTWIDTH] IN BLOOD BY AUTOMATED COUNT: 12.7 % (ref 11.5–14.5)
EST. GFR  (AFRICAN AMERICAN): >60 ML/MIN/1.73 M^2
EST. GFR  (NON AFRICAN AMERICAN): >60 ML/MIN/1.73 M^2
ESTIMATED AVG GLUCOSE: 117 MG/DL (ref 68–131)
FERRITIN SERPL-MCNC: 46 NG/ML (ref 20–300)
GLUCOSE SERPL-MCNC: 89 MG/DL (ref 70–110)
HBA1C MFR BLD: 5.7 % (ref 4–5.6)
HCT VFR BLD AUTO: 39.7 % (ref 37–48.5)
HDLC SERPL-MCNC: 134 MG/DL (ref 40–75)
HDLC SERPL: 53.2 % (ref 20–50)
HGB BLD-MCNC: 13.2 G/DL (ref 12–16)
IMM GRANULOCYTES # BLD AUTO: 0 K/UL (ref 0–0.04)
IMM GRANULOCYTES NFR BLD AUTO: 0 % (ref 0–0.5)
IRON SERPL-MCNC: 84 UG/DL (ref 30–160)
LDLC SERPL CALC-MCNC: 103 MG/DL (ref 63–159)
LYMPHOCYTES # BLD AUTO: 2.1 K/UL (ref 1–4.8)
LYMPHOCYTES NFR BLD: 64.5 % (ref 18–48)
MCH RBC QN AUTO: 33.5 PG (ref 27–31)
MCHC RBC AUTO-ENTMCNC: 33.2 G/DL (ref 32–36)
MCV RBC AUTO: 101 FL (ref 82–98)
MONOCYTES # BLD AUTO: 0.3 K/UL (ref 0.3–1)
MONOCYTES NFR BLD: 10.3 % (ref 4–15)
NEUTROPHILS # BLD AUTO: 0.7 K/UL (ref 1.8–7.7)
NEUTROPHILS NFR BLD: 20.8 % (ref 38–73)
NONHDLC SERPL-MCNC: 118 MG/DL
NRBC BLD-RTO: 0 /100 WBC
PLATELET # BLD AUTO: 255 K/UL (ref 150–450)
PLATELET BLD QL SMEAR: ABNORMAL
PMV BLD AUTO: 9.7 FL (ref 9.2–12.9)
POTASSIUM SERPL-SCNC: 4.4 MMOL/L (ref 3.5–5.1)
PROT SERPL-MCNC: 7.4 G/DL (ref 6–8.4)
RBC # BLD AUTO: 3.94 M/UL (ref 4–5.4)
SATURATED IRON: 19 % (ref 20–50)
SODIUM SERPL-SCNC: 143 MMOL/L (ref 136–145)
TOTAL IRON BINDING CAPACITY: 441 UG/DL (ref 250–450)
TRANSFERRIN SERPL-MCNC: 298 MG/DL (ref 200–375)
TRIGL SERPL-MCNC: 75 MG/DL (ref 30–150)
TSH SERPL DL<=0.005 MIU/L-ACNC: 0.93 UIU/ML (ref 0.4–4)
VIT B12 SERPL-MCNC: 545 PG/ML (ref 210–950)
WBC # BLD AUTO: 3.21 K/UL (ref 3.9–12.7)

## 2022-07-15 PROCEDURE — 87389 HIV-1 AG W/HIV-1&-2 AB AG IA: CPT | Performed by: FAMILY MEDICINE

## 2022-07-15 PROCEDURE — 80061 LIPID PANEL: CPT | Performed by: FAMILY MEDICINE

## 2022-07-15 PROCEDURE — 84466 ASSAY OF TRANSFERRIN: CPT | Performed by: FAMILY MEDICINE

## 2022-07-15 PROCEDURE — 82607 VITAMIN B-12: CPT | Performed by: FAMILY MEDICINE

## 2022-07-15 PROCEDURE — 80053 COMPREHEN METABOLIC PANEL: CPT | Performed by: FAMILY MEDICINE

## 2022-07-15 PROCEDURE — 85025 COMPLETE CBC W/AUTO DIFF WBC: CPT | Performed by: FAMILY MEDICINE

## 2022-07-15 PROCEDURE — 84443 ASSAY THYROID STIM HORMONE: CPT | Performed by: FAMILY MEDICINE

## 2022-07-15 PROCEDURE — 82728 ASSAY OF FERRITIN: CPT | Performed by: FAMILY MEDICINE

## 2022-07-15 PROCEDURE — 83036 HEMOGLOBIN GLYCOSYLATED A1C: CPT | Performed by: FAMILY MEDICINE

## 2022-07-15 PROCEDURE — 36415 COLL VENOUS BLD VENIPUNCTURE: CPT | Mod: PN | Performed by: FAMILY MEDICINE

## 2022-07-18 LAB — HIV 1+2 AB+HIV1 P24 AG SERPL QL IA: NEGATIVE

## 2022-07-18 RX ORDER — ATORVASTATIN CALCIUM 20 MG/1
20 TABLET, FILM COATED ORAL DAILY
Qty: 90 TABLET | Refills: 3 | Status: SHIPPED | OUTPATIENT
Start: 2022-07-18 | End: 2023-11-21 | Stop reason: SDUPTHER

## 2022-08-23 ENCOUNTER — LAB VISIT (OUTPATIENT)
Dept: LAB | Facility: HOSPITAL | Age: 52
End: 2022-08-23
Attending: INTERNAL MEDICINE
Payer: COMMERCIAL

## 2022-08-23 ENCOUNTER — OFFICE VISIT (OUTPATIENT)
Dept: PRIMARY CARE CLINIC | Facility: CLINIC | Age: 52
End: 2022-08-23
Payer: COMMERCIAL

## 2022-08-23 VITALS
HEIGHT: 63 IN | TEMPERATURE: 99 F | BODY MASS INDEX: 27.34 KG/M2 | OXYGEN SATURATION: 98 % | DIASTOLIC BLOOD PRESSURE: 78 MMHG | HEART RATE: 83 BPM | WEIGHT: 154.31 LBS | SYSTOLIC BLOOD PRESSURE: 126 MMHG

## 2022-08-23 DIAGNOSIS — M25.542 PAIN INVOLVING JOINTS OF FINGERS OF BOTH HANDS: ICD-10-CM

## 2022-08-23 DIAGNOSIS — M25.541 PAIN INVOLVING JOINTS OF FINGERS OF BOTH HANDS: ICD-10-CM

## 2022-08-23 DIAGNOSIS — E11.9 TYPE 2 DIABETES MELLITUS WITHOUT COMPLICATION, WITHOUT LONG-TERM CURRENT USE OF INSULIN: ICD-10-CM

## 2022-08-23 DIAGNOSIS — I10 ESSENTIAL HYPERTENSION: ICD-10-CM

## 2022-08-23 DIAGNOSIS — M25.542 PAIN INVOLVING JOINTS OF FINGERS OF BOTH HANDS: Primary | ICD-10-CM

## 2022-08-23 DIAGNOSIS — M25.541 PAIN INVOLVING JOINTS OF FINGERS OF BOTH HANDS: Primary | ICD-10-CM

## 2022-08-23 LAB
CCP AB SER IA-ACNC: <0.5 U/ML
CRP SERPL-MCNC: 1.2 MG/L (ref 0–8.2)
ERYTHROCYTE [SEDIMENTATION RATE] IN BLOOD BY PHOTOMETRIC METHOD: 3 MM/HR (ref 0–36)
URATE SERPL-MCNC: 5.9 MG/DL (ref 2.4–5.7)

## 2022-08-23 PROCEDURE — 1160F RVW MEDS BY RX/DR IN RCRD: CPT | Mod: CPTII,S$GLB,, | Performed by: INTERNAL MEDICINE

## 2022-08-23 PROCEDURE — 36415 COLL VENOUS BLD VENIPUNCTURE: CPT | Mod: PN | Performed by: INTERNAL MEDICINE

## 2022-08-23 PROCEDURE — 3074F SYST BP LT 130 MM HG: CPT | Mod: CPTII,S$GLB,, | Performed by: INTERNAL MEDICINE

## 2022-08-23 PROCEDURE — 3061F PR NEG MICROALBUMINURIA RESULT DOCUMENTED/REVIEW: ICD-10-PCS | Mod: CPTII,S$GLB,, | Performed by: INTERNAL MEDICINE

## 2022-08-23 PROCEDURE — 1160F PR REVIEW ALL MEDS BY PRESCRIBER/CLIN PHARMACIST DOCUMENTED: ICD-10-PCS | Mod: CPTII,S$GLB,, | Performed by: INTERNAL MEDICINE

## 2022-08-23 PROCEDURE — 3061F NEG MICROALBUMINURIA REV: CPT | Mod: CPTII,S$GLB,, | Performed by: INTERNAL MEDICINE

## 2022-08-23 PROCEDURE — 86038 ANTINUCLEAR ANTIBODIES: CPT | Performed by: INTERNAL MEDICINE

## 2022-08-23 PROCEDURE — 86200 CCP ANTIBODY: CPT | Performed by: INTERNAL MEDICINE

## 2022-08-23 PROCEDURE — 3078F PR MOST RECENT DIASTOLIC BLOOD PRESSURE < 80 MM HG: ICD-10-PCS | Mod: CPTII,S$GLB,, | Performed by: INTERNAL MEDICINE

## 2022-08-23 PROCEDURE — 3008F BODY MASS INDEX DOCD: CPT | Mod: CPTII,S$GLB,, | Performed by: INTERNAL MEDICINE

## 2022-08-23 PROCEDURE — 3072F LOW RISK FOR RETINOPATHY: CPT | Mod: CPTII,S$GLB,, | Performed by: INTERNAL MEDICINE

## 2022-08-23 PROCEDURE — 3066F NEPHROPATHY DOC TX: CPT | Mod: CPTII,S$GLB,, | Performed by: INTERNAL MEDICINE

## 2022-08-23 PROCEDURE — 3066F PR DOCUMENTATION OF TREATMENT FOR NEPHROPATHY: ICD-10-PCS | Mod: CPTII,S$GLB,, | Performed by: INTERNAL MEDICINE

## 2022-08-23 PROCEDURE — 99213 OFFICE O/P EST LOW 20 MIN: CPT | Mod: S$GLB,,, | Performed by: INTERNAL MEDICINE

## 2022-08-23 PROCEDURE — 1159F MED LIST DOCD IN RCRD: CPT | Mod: CPTII,S$GLB,, | Performed by: INTERNAL MEDICINE

## 2022-08-23 PROCEDURE — 3044F HG A1C LEVEL LT 7.0%: CPT | Mod: CPTII,S$GLB,, | Performed by: INTERNAL MEDICINE

## 2022-08-23 PROCEDURE — 99999 PR PBB SHADOW E&M-EST. PATIENT-LVL V: CPT | Mod: PBBFAC,,, | Performed by: INTERNAL MEDICINE

## 2022-08-23 PROCEDURE — 99213 PR OFFICE/OUTPT VISIT, EST, LEVL III, 20-29 MIN: ICD-10-PCS | Mod: S$GLB,,, | Performed by: INTERNAL MEDICINE

## 2022-08-23 PROCEDURE — 3008F PR BODY MASS INDEX (BMI) DOCUMENTED: ICD-10-PCS | Mod: CPTII,S$GLB,, | Performed by: INTERNAL MEDICINE

## 2022-08-23 PROCEDURE — 84550 ASSAY OF BLOOD/URIC ACID: CPT | Performed by: INTERNAL MEDICINE

## 2022-08-23 PROCEDURE — 1159F PR MEDICATION LIST DOCUMENTED IN MEDICAL RECORD: ICD-10-PCS | Mod: CPTII,S$GLB,, | Performed by: INTERNAL MEDICINE

## 2022-08-23 PROCEDURE — 86140 C-REACTIVE PROTEIN: CPT | Performed by: INTERNAL MEDICINE

## 2022-08-23 PROCEDURE — 99999 PR PBB SHADOW E&M-EST. PATIENT-LVL V: ICD-10-PCS | Mod: PBBFAC,,, | Performed by: INTERNAL MEDICINE

## 2022-08-23 PROCEDURE — 3078F DIAST BP <80 MM HG: CPT | Mod: CPTII,S$GLB,, | Performed by: INTERNAL MEDICINE

## 2022-08-23 PROCEDURE — 3074F PR MOST RECENT SYSTOLIC BLOOD PRESSURE < 130 MM HG: ICD-10-PCS | Mod: CPTII,S$GLB,, | Performed by: INTERNAL MEDICINE

## 2022-08-23 PROCEDURE — 3044F PR MOST RECENT HEMOGLOBIN A1C LEVEL <7.0%: ICD-10-PCS | Mod: CPTII,S$GLB,, | Performed by: INTERNAL MEDICINE

## 2022-08-23 PROCEDURE — 86431 RHEUMATOID FACTOR QUANT: CPT | Performed by: INTERNAL MEDICINE

## 2022-08-23 PROCEDURE — 85652 RBC SED RATE AUTOMATED: CPT | Performed by: INTERNAL MEDICINE

## 2022-08-23 PROCEDURE — 3072F PR LOW RISK FOR RETINOPATHY: ICD-10-PCS | Mod: CPTII,S$GLB,, | Performed by: INTERNAL MEDICINE

## 2022-08-23 RX ORDER — MELOXICAM 15 MG/1
15 TABLET ORAL DAILY PRN
Qty: 30 TABLET | Refills: 0 | Status: SHIPPED | OUTPATIENT
Start: 2022-08-23 | End: 2022-12-15

## 2022-08-23 NOTE — PROGRESS NOTES
Subjective:       Patient ID: Joan Rizvi is a 51 y.o. female.    Chief Complaint: Hand Pain (Bilateral hand pain , mainly thumbs getting worse )    Last seen by PCP five months ago for annual physical. Presents urgently today for joint pain in hands - a new problem not previously evaluated. Onset about two months ago, no trauma or strenuous physical activity or manual labor. Pain in joints of fingers on both hands, worse in the thumbs, constant including disturbing her rest at night. Some swelling and stiffness of the joints, also numbness and tingling of the hands. No hand weakness. Has had posterior neck pain, but no radiation of pain to upper limbs to suggest cervical radiculitis. Diagnosed with diabetes a year ago, but was quickly able to get HbA1c down from 7.0 to 5.7% last month, doubt diabetic neuropathy. Does have a family history of Rheumatoid Arthritis in mother and maternal aunt. Has used Acetaminophen with no relief. Does not currently have the Ibuprofen 800, Percocet, Tramadol or Tylenol #3 prescribed earlier this year for unrelated conditions (none of which show up on [facility] website).     PMH: includes HTN, DM2, Chol, GERD, Vit D insuff, Pancreatitis, B12 normal 545.  PSH: reviewed.   Social: former tobacco use, alcohol 1 a week, office work.   FMH: reviewed, no lupus.   Allergies: HCTZ, pineapple.   Medications: list reviewed.     Review of Systems   Constitutional: Negative for chills and fever.   Respiratory: Negative for cough and shortness of breath.    Cardiovascular: Negative for chest pain and palpitations.   Musculoskeletal: Positive for arthralgias, back pain and neck pain.   Neurological: Negative for weakness, headaches, disturbances in coordination and coordination difficulties.         Objective:    /78, Pulse 83, Temp 98.5, O2 Sat 98%  Physical Exam  Constitutional:       General: She is not in acute distress.  Cardiovascular:      Rate and Rhythm: Normal rate and regular  rhythm.   Pulmonary:      Effort: Pulmonary effort is normal. No respiratory distress.      Breath sounds: Normal breath sounds.   Musculoskeletal:         General: Normal range of motion.      Comments: Hand joint tenderness mostly in both joints of both thumbs where there is mild swelling, no deformity; no tenderness of MCP joints or wrists.    Skin:     General: Skin is warm and dry.      Findings: No erythema or rash.   Neurological:      Mental Status: She is alert.      Motor: No weakness or atrophy.         Assessment:       Problem List Items Addressed This Visit     Essential hypertension    Type 2 diabetes mellitus      Other Visit Diagnoses     Pain involving joints of fingers of both hands    -  Primary    Relevant Medications    meloxicam (MOBIC) 15 MG tablet    Other Relevant Orders    IVETH Screen w/Reflex    Rheumatoid Factor    Uric Acid    Sedimentation rate    C-Reactive Protein    CYCLIC CITRUL PEPTIDE ANTIBODY, IGG          Plan:       Pain involving joints of fingers of both hands  -     IVETH Screen w/Reflex; Future; Expected date: 08/23/2022  -     Rheumatoid Factor; Future; Expected date: 08/23/2022  -     Uric Acid; Future; Expected date: 08/23/2022  -     Sedimentation rate; Future; Expected date: 08/23/2022  -     C-Reactive Protein; Future; Expected date: 08/23/2022  -     CYCLIC CITRUL PEPTIDE ANTIBODY, IGG; Future; Expected date: 08/23/2022  -     meloxicam (MOBIC) 15 MG tablet; Take 1 tablet (15 mg total) by mouth daily as needed (Pain and inflammation.).  Dispense: 30 tablet; Refill: 0  -     Hand and spine imaging deferred at this time, consider further evaluation if labs unrevealing.    Type 2 diabetes mellitus without complication, without long-term current use of insulin - much improved with Metformin and diet.     Essential hypertension well controlled on Amlodipine.

## 2022-08-24 ENCOUNTER — PATIENT MESSAGE (OUTPATIENT)
Dept: PRIMARY CARE CLINIC | Facility: CLINIC | Age: 52
End: 2022-08-24
Payer: COMMERCIAL

## 2022-08-24 DIAGNOSIS — M25.541 PAIN INVOLVING JOINTS OF FINGERS OF BOTH HANDS: Primary | ICD-10-CM

## 2022-08-24 DIAGNOSIS — M25.542 PAIN INVOLVING JOINTS OF FINGERS OF BOTH HANDS: Primary | ICD-10-CM

## 2022-08-24 LAB
ANA SER QL IF: NORMAL
RHEUMATOID FACT SERPL-ACNC: <13 IU/ML (ref 0–15)

## 2022-09-15 ENCOUNTER — OFFICE VISIT (OUTPATIENT)
Dept: PRIMARY CARE CLINIC | Facility: CLINIC | Age: 52
End: 2022-09-15
Payer: COMMERCIAL

## 2022-09-15 ENCOUNTER — LAB VISIT (OUTPATIENT)
Dept: LAB | Facility: HOSPITAL | Age: 52
End: 2022-09-15
Attending: INTERNAL MEDICINE
Payer: COMMERCIAL

## 2022-09-15 VITALS
BODY MASS INDEX: 28.12 KG/M2 | WEIGHT: 158.69 LBS | HEIGHT: 63 IN | HEART RATE: 77 BPM | SYSTOLIC BLOOD PRESSURE: 118 MMHG | DIASTOLIC BLOOD PRESSURE: 80 MMHG | TEMPERATURE: 99 F | OXYGEN SATURATION: 97 %

## 2022-09-15 DIAGNOSIS — L29.9 ITCHING: ICD-10-CM

## 2022-09-15 DIAGNOSIS — G47.00 INSOMNIA, UNSPECIFIED TYPE: ICD-10-CM

## 2022-09-15 DIAGNOSIS — L29.9 ITCHING: Primary | ICD-10-CM

## 2022-09-15 LAB
BASOPHILS # BLD AUTO: 0.05 K/UL (ref 0–0.2)
BASOPHILS NFR BLD: 1.1 % (ref 0–1.9)
DIFFERENTIAL METHOD: ABNORMAL
EOSINOPHIL # BLD AUTO: 0.1 K/UL (ref 0–0.5)
EOSINOPHIL NFR BLD: 1.1 % (ref 0–8)
ERYTHROCYTE [DISTWIDTH] IN BLOOD BY AUTOMATED COUNT: 13.2 % (ref 11.5–14.5)
HCT VFR BLD AUTO: 35.4 % (ref 37–48.5)
HGB BLD-MCNC: 12 G/DL (ref 12–16)
IMM GRANULOCYTES # BLD AUTO: 0.01 K/UL (ref 0–0.04)
IMM GRANULOCYTES NFR BLD AUTO: 0.2 % (ref 0–0.5)
LYMPHOCYTES # BLD AUTO: 2.5 K/UL (ref 1–4.8)
LYMPHOCYTES NFR BLD: 54.6 % (ref 18–48)
MCH RBC QN AUTO: 34.2 PG (ref 27–31)
MCHC RBC AUTO-ENTMCNC: 33.9 G/DL (ref 32–36)
MCV RBC AUTO: 101 FL (ref 82–98)
MONOCYTES # BLD AUTO: 0.5 K/UL (ref 0.3–1)
MONOCYTES NFR BLD: 11.5 % (ref 4–15)
NEUTROPHILS # BLD AUTO: 1.5 K/UL (ref 1.8–7.7)
NEUTROPHILS NFR BLD: 31.5 % (ref 38–73)
NRBC BLD-RTO: 0 /100 WBC
PLATELET # BLD AUTO: 313 K/UL (ref 150–450)
PMV BLD AUTO: 9.6 FL (ref 9.2–12.9)
RBC # BLD AUTO: 3.51 M/UL (ref 4–5.4)
WBC # BLD AUTO: 4.6 K/UL (ref 3.9–12.7)

## 2022-09-15 PROCEDURE — 3061F PR NEG MICROALBUMINURIA RESULT DOCUMENTED/REVIEW: ICD-10-PCS | Mod: CPTII,S$GLB,, | Performed by: INTERNAL MEDICINE

## 2022-09-15 PROCEDURE — 99213 PR OFFICE/OUTPT VISIT, EST, LEVL III, 20-29 MIN: ICD-10-PCS | Mod: S$GLB,,, | Performed by: INTERNAL MEDICINE

## 2022-09-15 PROCEDURE — 1160F RVW MEDS BY RX/DR IN RCRD: CPT | Mod: CPTII,S$GLB,, | Performed by: INTERNAL MEDICINE

## 2022-09-15 PROCEDURE — 99999 PR PBB SHADOW E&M-EST. PATIENT-LVL V: CPT | Mod: PBBFAC,,, | Performed by: INTERNAL MEDICINE

## 2022-09-15 PROCEDURE — 3066F PR DOCUMENTATION OF TREATMENT FOR NEPHROPATHY: ICD-10-PCS | Mod: CPTII,S$GLB,, | Performed by: INTERNAL MEDICINE

## 2022-09-15 PROCEDURE — 1160F PR REVIEW ALL MEDS BY PRESCRIBER/CLIN PHARMACIST DOCUMENTED: ICD-10-PCS | Mod: CPTII,S$GLB,, | Performed by: INTERNAL MEDICINE

## 2022-09-15 PROCEDURE — 3044F HG A1C LEVEL LT 7.0%: CPT | Mod: CPTII,S$GLB,, | Performed by: INTERNAL MEDICINE

## 2022-09-15 PROCEDURE — 80053 COMPREHEN METABOLIC PANEL: CPT | Performed by: INTERNAL MEDICINE

## 2022-09-15 PROCEDURE — 1159F MED LIST DOCD IN RCRD: CPT | Mod: CPTII,S$GLB,, | Performed by: INTERNAL MEDICINE

## 2022-09-15 PROCEDURE — 99999 PR PBB SHADOW E&M-EST. PATIENT-LVL V: ICD-10-PCS | Mod: PBBFAC,,, | Performed by: INTERNAL MEDICINE

## 2022-09-15 PROCEDURE — 3074F SYST BP LT 130 MM HG: CPT | Mod: CPTII,S$GLB,, | Performed by: INTERNAL MEDICINE

## 2022-09-15 PROCEDURE — 3008F PR BODY MASS INDEX (BMI) DOCUMENTED: ICD-10-PCS | Mod: CPTII,S$GLB,, | Performed by: INTERNAL MEDICINE

## 2022-09-15 PROCEDURE — 3079F DIAST BP 80-89 MM HG: CPT | Mod: CPTII,S$GLB,, | Performed by: INTERNAL MEDICINE

## 2022-09-15 PROCEDURE — 3079F PR MOST RECENT DIASTOLIC BLOOD PRESSURE 80-89 MM HG: ICD-10-PCS | Mod: CPTII,S$GLB,, | Performed by: INTERNAL MEDICINE

## 2022-09-15 PROCEDURE — 3044F PR MOST RECENT HEMOGLOBIN A1C LEVEL <7.0%: ICD-10-PCS | Mod: CPTII,S$GLB,, | Performed by: INTERNAL MEDICINE

## 2022-09-15 PROCEDURE — 1159F PR MEDICATION LIST DOCUMENTED IN MEDICAL RECORD: ICD-10-PCS | Mod: CPTII,S$GLB,, | Performed by: INTERNAL MEDICINE

## 2022-09-15 PROCEDURE — 3072F PR LOW RISK FOR RETINOPATHY: ICD-10-PCS | Mod: CPTII,S$GLB,, | Performed by: INTERNAL MEDICINE

## 2022-09-15 PROCEDURE — 99213 OFFICE O/P EST LOW 20 MIN: CPT | Mod: S$GLB,,, | Performed by: INTERNAL MEDICINE

## 2022-09-15 PROCEDURE — 36415 COLL VENOUS BLD VENIPUNCTURE: CPT | Mod: PN | Performed by: INTERNAL MEDICINE

## 2022-09-15 PROCEDURE — 3008F BODY MASS INDEX DOCD: CPT | Mod: CPTII,S$GLB,, | Performed by: INTERNAL MEDICINE

## 2022-09-15 PROCEDURE — 3072F LOW RISK FOR RETINOPATHY: CPT | Mod: CPTII,S$GLB,, | Performed by: INTERNAL MEDICINE

## 2022-09-15 PROCEDURE — 3066F NEPHROPATHY DOC TX: CPT | Mod: CPTII,S$GLB,, | Performed by: INTERNAL MEDICINE

## 2022-09-15 PROCEDURE — 3074F PR MOST RECENT SYSTOLIC BLOOD PRESSURE < 130 MM HG: ICD-10-PCS | Mod: CPTII,S$GLB,, | Performed by: INTERNAL MEDICINE

## 2022-09-15 PROCEDURE — 3061F NEG MICROALBUMINURIA REV: CPT | Mod: CPTII,S$GLB,, | Performed by: INTERNAL MEDICINE

## 2022-09-15 PROCEDURE — 85025 COMPLETE CBC W/AUTO DIFF WBC: CPT | Performed by: INTERNAL MEDICINE

## 2022-09-15 RX ORDER — ZOLPIDEM TARTRATE 5 MG/1
5 TABLET ORAL NIGHTLY PRN
Qty: 20 TABLET | Refills: 0 | Status: SHIPPED | OUTPATIENT
Start: 2022-09-15 | End: 2023-01-23 | Stop reason: SDUPTHER

## 2022-09-15 NOTE — PROGRESS NOTES
"Subjective:       Patient ID: Joan Rizvi is a 51 y.o. female.    Chief Complaint: ITCHING (ALL OVER BODY FOR TWO DAYS )    Presents for urgent visit scheduled today for itching. Describes generalized body pruritis that began yesterday. No rash noted. Denies any new exposures except that she recently started taking Meloxicam 15mg about three weeks ago for arthritis pain. No recent travel, no change in personal hygiene products or laundry detergent. Only known food allergy is pineapple which she has avoided.     Past history, meds and allergies reviewed. Not currently taking the Zyrtec that is listed.    Review of Systems   Constitutional:  Negative for chills, diaphoresis and fever.   HENT:  Negative for nasal congestion, facial swelling and trouble swallowing.    Eyes:  Negative for discharge, redness and itching.   Respiratory:  Negative for cough, chest tightness, shortness of breath and wheezing.    Cardiovascular:  Negative for chest pain and leg swelling.   Gastrointestinal:  Negative for abdominal pain, diarrhea, nausea and vomiting.   Genitourinary:  Positive for hot flashes.   Integumentary:  Negative for color change, rash and mole/lesion.   Neurological:  Negative for dizziness and headaches.   Psychiatric/Behavioral:  Positive for sleep disturbance.         Trouble sleeping lately, no relief with Melatonin or Benadryl, requests Rx sleep aid.       Objective:    /80, Pulse 77, Temp 98.9, O2 Sat 97%, Ht 5' 3", Wt 158.7  Physical Exam  Constitutional:       General: She is not in acute distress.     Appearance: She is not ill-appearing.   HENT:      Nose: Nose normal. No congestion.   Eyes:      Conjunctiva/sclera: Conjunctivae normal.   Cardiovascular:      Rate and Rhythm: Normal rate and regular rhythm.   Pulmonary:      Effort: Pulmonary effort is normal. No respiratory distress.      Breath sounds: Normal breath sounds.   Musculoskeletal:         General: Normal range of motion.      Right " lower leg: No edema.      Left lower leg: No edema.   Skin:     General: Skin is warm and dry.      Coloration: Skin is not pale.      Findings: No bruising, erythema, lesion or rash.   Neurological:      Mental Status: She is alert.   Psychiatric:         Mood and Affect: Mood normal.         Behavior: Behavior normal.       Assessment:       Problem List Items Addressed This Visit    None  Visit Diagnoses       Itching    -  Primary    Relevant Orders    CBC Auto Differential    Comprehensive Metabolic Panel    Insomnia, unspecified type        Relevant Medications    zolpidem (AMBIEN) 5 MG Tab              Plan:       Itching  -     CBC Auto Differential; Future; Expected date: 09/15/2022  -     Comprehensive Metabolic Panel; Future; Expected date: 09/15/2022  -     start taking your Zyrtec 10mg daily, topical moisturizers recommended, stay cool including avoidance of hot bath/shower.     Insomnia, unspecified type  -     zolpidem (AMBIEN) 5 MG Tab; Take 1 tablet (5 mg total) by mouth nightly as needed (Insomnia.).  Dispense: 20 tablet; Refill: 0

## 2022-09-16 ENCOUNTER — PATIENT MESSAGE (OUTPATIENT)
Dept: PRIMARY CARE CLINIC | Facility: CLINIC | Age: 52
End: 2022-09-16
Payer: COMMERCIAL

## 2022-09-16 LAB
ALBUMIN SERPL BCP-MCNC: 4.1 G/DL (ref 3.5–5.2)
ALP SERPL-CCNC: 101 U/L (ref 55–135)
ALT SERPL W/O P-5'-P-CCNC: 22 U/L (ref 10–44)
ANION GAP SERPL CALC-SCNC: 5 MMOL/L (ref 8–16)
AST SERPL-CCNC: 22 U/L (ref 10–40)
BILIRUB SERPL-MCNC: 0.3 MG/DL (ref 0.1–1)
BUN SERPL-MCNC: 13 MG/DL (ref 6–20)
CALCIUM SERPL-MCNC: 9.7 MG/DL (ref 8.7–10.5)
CHLORIDE SERPL-SCNC: 103 MMOL/L (ref 95–110)
CO2 SERPL-SCNC: 28 MMOL/L (ref 23–29)
CREAT SERPL-MCNC: 0.8 MG/DL (ref 0.5–1.4)
EST. GFR  (NO RACE VARIABLE): >60 ML/MIN/1.73 M^2
GLUCOSE SERPL-MCNC: 136 MG/DL (ref 70–110)
POTASSIUM SERPL-SCNC: 3.7 MMOL/L (ref 3.5–5.1)
PROT SERPL-MCNC: 7.3 G/DL (ref 6–8.4)
SODIUM SERPL-SCNC: 136 MMOL/L (ref 136–145)

## 2022-10-03 ENCOUNTER — PATIENT MESSAGE (OUTPATIENT)
Dept: PRIMARY CARE CLINIC | Facility: CLINIC | Age: 52
End: 2022-10-03
Payer: COMMERCIAL

## 2022-10-03 DIAGNOSIS — M25.541 PAIN INVOLVING JOINTS OF FINGERS OF BOTH HANDS: ICD-10-CM

## 2022-10-03 DIAGNOSIS — M25.542 PAIN INVOLVING JOINTS OF FINGERS OF BOTH HANDS: ICD-10-CM

## 2022-10-03 RX ORDER — MELOXICAM 15 MG/1
15 TABLET ORAL DAILY PRN
Qty: 30 TABLET | Refills: 0 | Status: CANCELLED | OUTPATIENT
Start: 2022-10-03

## 2022-10-03 NOTE — TELEPHONE ENCOUNTER
Refill Routing Note   Medication(s) are not appropriate for processing by [facility] for the following reason(s):      - Outside of protocol    [facility] action(s):  Route          Medication reconciliation completed: No     Appointments  past 12m or future 3m with PCP    Date Provider   Last Visit   9/15/2022 Melissa Neely MD   Next Visit   Visit date not found Melissa Neely MD   ED visits in past 90 days: 0        Note composed:2:48 PM 10/03/2022

## 2022-10-05 ENCOUNTER — HOSPITAL ENCOUNTER (OUTPATIENT)
Facility: HOSPITAL | Age: 52
Discharge: HOME | End: 2022-10-07
Attending: EMERGENCY MEDICINE | Admitting: HOSPITALIST
Payer: COMMERCIAL

## 2022-10-05 DIAGNOSIS — K85.90 ACUTE PANCREATITIS, UNSPECIFIED COMPLICATION STATUS, UNSPECIFIED PANCREATITIS TYPE: Primary | ICD-10-CM

## 2022-10-05 DIAGNOSIS — K85.90 ACUTE ON CHRONIC PANCREATITIS: ICD-10-CM

## 2022-10-05 DIAGNOSIS — K86.1 ACUTE ON CHRONIC PANCREATITIS: ICD-10-CM

## 2022-10-05 DIAGNOSIS — R10.9 ABDOMINAL PAIN: ICD-10-CM

## 2022-10-05 LAB
ALBUMIN SERPL BCP-MCNC: 4.2 G/DL (ref 3.5–5.2)
ALP SERPL-CCNC: 145 U/L (ref 55–135)
ALT SERPL W/O P-5'-P-CCNC: 32 U/L (ref 10–44)
ANION GAP SERPL CALC-SCNC: 11 MMOL/L (ref 8–16)
AST SERPL-CCNC: 28 U/L (ref 10–40)
BACTERIA #/AREA URNS AUTO: ABNORMAL /HPF
BASOPHILS # BLD AUTO: 0.02 K/UL (ref 0–0.2)
BASOPHILS NFR BLD: 0.4 % (ref 0–1.9)
BILIRUB SERPL-MCNC: 0.6 MG/DL (ref 0.1–1)
BILIRUB UR QL STRIP: NEGATIVE
BUN SERPL-MCNC: 12 MG/DL (ref 6–20)
CALCIUM SERPL-MCNC: 9.7 MG/DL (ref 8.7–10.5)
CHLORIDE SERPL-SCNC: 97 MMOL/L (ref 95–110)
CLARITY UR REFRACT.AUTO: ABNORMAL
CO2 SERPL-SCNC: 26 MMOL/L (ref 23–29)
COLOR UR AUTO: YELLOW
CREAT SERPL-MCNC: 0.8 MG/DL (ref 0.5–1.4)
DIFFERENTIAL METHOD: ABNORMAL
EOSINOPHIL # BLD AUTO: 0 K/UL (ref 0–0.5)
EOSINOPHIL NFR BLD: 0 % (ref 0–8)
ERYTHROCYTE [DISTWIDTH] IN BLOOD BY AUTOMATED COUNT: 12.9 % (ref 11.5–14.5)
EST. GFR  (NO RACE VARIABLE): >60 ML/MIN/1.73 M^2
ESTIMATED AVG GLUCOSE: 117 MG/DL (ref 68–131)
GLUCOSE SERPL-MCNC: 122 MG/DL (ref 70–110)
GLUCOSE UR QL STRIP: NEGATIVE
HBA1C MFR BLD: 5.7 % (ref 4–5.6)
HCT VFR BLD AUTO: 39.2 % (ref 37–48.5)
HCV AB SERPL QL IA: NORMAL
HGB BLD-MCNC: 13.4 G/DL (ref 12–16)
HGB UR QL STRIP: NEGATIVE
HYALINE CASTS UR QL AUTO: 48 /LPF
IMM GRANULOCYTES # BLD AUTO: 0.01 K/UL (ref 0–0.04)
IMM GRANULOCYTES NFR BLD AUTO: 0.2 % (ref 0–0.5)
KETONES UR QL STRIP: ABNORMAL
LEUKOCYTE ESTERASE UR QL STRIP: NEGATIVE
LIPASE SERPL-CCNC: 103 U/L (ref 4–60)
LYMPHOCYTES # BLD AUTO: 0.8 K/UL (ref 1–4.8)
LYMPHOCYTES NFR BLD: 15.5 % (ref 18–48)
MCH RBC QN AUTO: 33.8 PG (ref 27–31)
MCHC RBC AUTO-ENTMCNC: 34.2 G/DL (ref 32–36)
MCV RBC AUTO: 99 FL (ref 82–98)
MICROSCOPIC COMMENT: ABNORMAL
MONOCYTES # BLD AUTO: 0.3 K/UL (ref 0.3–1)
MONOCYTES NFR BLD: 5.5 % (ref 4–15)
NEUTROPHILS # BLD AUTO: 4.1 K/UL (ref 1.8–7.7)
NEUTROPHILS NFR BLD: 78.4 % (ref 38–73)
NITRITE UR QL STRIP: NEGATIVE
NRBC BLD-RTO: 0 /100 WBC
PH UR STRIP: 5 [PH] (ref 5–8)
PLATELET # BLD AUTO: 309 K/UL (ref 150–450)
PMV BLD AUTO: 8.7 FL (ref 9.2–12.9)
POTASSIUM SERPL-SCNC: 3.7 MMOL/L (ref 3.5–5.1)
PROT SERPL-MCNC: 7.8 G/DL (ref 6–8.4)
PROT UR QL STRIP: ABNORMAL
RBC # BLD AUTO: 3.97 M/UL (ref 4–5.4)
RBC #/AREA URNS AUTO: 4 /HPF (ref 0–4)
SODIUM SERPL-SCNC: 134 MMOL/L (ref 136–145)
SP GR UR STRIP: 1.01 (ref 1–1.03)
SQUAMOUS #/AREA URNS AUTO: 16 /HPF
URN SPEC COLLECT METH UR: ABNORMAL
WBC # BLD AUTO: 5.23 K/UL (ref 3.9–12.7)
WBC #/AREA URNS AUTO: 5 /HPF (ref 0–5)

## 2022-10-05 PROCEDURE — 83036 HEMOGLOBIN GLYCOSYLATED A1C: CPT | Performed by: EMERGENCY MEDICINE

## 2022-10-05 PROCEDURE — 96361 HYDRATE IV INFUSION ADD-ON: CPT

## 2022-10-05 PROCEDURE — 96374 THER/PROPH/DIAG INJ IV PUSH: CPT | Mod: 59

## 2022-10-05 PROCEDURE — 99285 PR EMERGENCY DEPT VISIT,LEVEL V: ICD-10-PCS | Mod: ,,, | Performed by: PHYSICIAN ASSISTANT

## 2022-10-05 PROCEDURE — 25000003 PHARM REV CODE 250: Performed by: HOSPITALIST

## 2022-10-05 PROCEDURE — 86803 HEPATITIS C AB TEST: CPT | Performed by: EMERGENCY MEDICINE

## 2022-10-05 PROCEDURE — 93005 ELECTROCARDIOGRAM TRACING: CPT

## 2022-10-05 PROCEDURE — 99285 EMERGENCY DEPT VISIT HI MDM: CPT | Mod: ,,, | Performed by: PHYSICIAN ASSISTANT

## 2022-10-05 PROCEDURE — 63600175 PHARM REV CODE 636 W HCPCS: Performed by: HOSPITALIST

## 2022-10-05 PROCEDURE — G0378 HOSPITAL OBSERVATION PER HR: HCPCS

## 2022-10-05 PROCEDURE — 93010 EKG 12-LEAD: ICD-10-PCS | Mod: ,,, | Performed by: INTERNAL MEDICINE

## 2022-10-05 PROCEDURE — 81001 URINALYSIS AUTO W/SCOPE: CPT | Performed by: EMERGENCY MEDICINE

## 2022-10-05 PROCEDURE — 25000003 PHARM REV CODE 250: Performed by: PHYSICIAN ASSISTANT

## 2022-10-05 PROCEDURE — 96372 THER/PROPH/DIAG INJ SC/IM: CPT | Performed by: HOSPITALIST

## 2022-10-05 PROCEDURE — 80053 COMPREHEN METABOLIC PANEL: CPT | Performed by: EMERGENCY MEDICINE

## 2022-10-05 PROCEDURE — 63600175 PHARM REV CODE 636 W HCPCS: Performed by: PHYSICIAN ASSISTANT

## 2022-10-05 PROCEDURE — 99220 PR INITIAL OBSERVATION CARE,LEVL III: ICD-10-PCS | Mod: ,,, | Performed by: HOSPITALIST

## 2022-10-05 PROCEDURE — 96375 TX/PRO/DX INJ NEW DRUG ADDON: CPT

## 2022-10-05 PROCEDURE — 83690 ASSAY OF LIPASE: CPT | Performed by: PHYSICIAN ASSISTANT

## 2022-10-05 PROCEDURE — 96376 TX/PRO/DX INJ SAME DRUG ADON: CPT

## 2022-10-05 PROCEDURE — 99285 EMERGENCY DEPT VISIT HI MDM: CPT | Mod: 25

## 2022-10-05 PROCEDURE — 93010 ELECTROCARDIOGRAM REPORT: CPT | Mod: ,,, | Performed by: INTERNAL MEDICINE

## 2022-10-05 PROCEDURE — 85025 COMPLETE CBC W/AUTO DIFF WBC: CPT | Performed by: EMERGENCY MEDICINE

## 2022-10-05 PROCEDURE — 99220 PR INITIAL OBSERVATION CARE,LEVL III: CPT | Mod: ,,, | Performed by: HOSPITALIST

## 2022-10-05 PROCEDURE — 25500020 PHARM REV CODE 255: Performed by: EMERGENCY MEDICINE

## 2022-10-05 RX ORDER — NALOXONE HCL 0.4 MG/ML
0.02 VIAL (ML) INJECTION
Status: DISCONTINUED | OUTPATIENT
Start: 2022-10-05 | End: 2022-10-07 | Stop reason: HOSPADM

## 2022-10-05 RX ORDER — ONDANSETRON 2 MG/ML
4 INJECTION INTRAMUSCULAR; INTRAVENOUS EVERY 8 HOURS PRN
Status: DISCONTINUED | OUTPATIENT
Start: 2022-10-05 | End: 2022-10-06

## 2022-10-05 RX ORDER — PROCHLORPERAZINE EDISYLATE 5 MG/ML
5 INJECTION INTRAMUSCULAR; INTRAVENOUS EVERY 6 HOURS PRN
Status: DISCONTINUED | OUTPATIENT
Start: 2022-10-05 | End: 2022-10-07 | Stop reason: HOSPADM

## 2022-10-05 RX ORDER — SODIUM CHLORIDE 0.9 % (FLUSH) 0.9 %
10 SYRINGE (ML) INJECTION
Status: DISCONTINUED | OUTPATIENT
Start: 2022-10-05 | End: 2022-10-07 | Stop reason: HOSPADM

## 2022-10-05 RX ORDER — AMLODIPINE BESYLATE 10 MG/1
10 TABLET ORAL DAILY
Status: DISCONTINUED | OUTPATIENT
Start: 2022-10-06 | End: 2022-10-07 | Stop reason: HOSPADM

## 2022-10-05 RX ORDER — MORPHINE SULFATE 4 MG/ML
4 INJECTION, SOLUTION INTRAMUSCULAR; INTRAVENOUS
Status: COMPLETED | OUTPATIENT
Start: 2022-10-05 | End: 2022-10-05

## 2022-10-05 RX ORDER — INSULIN ASPART 100 [IU]/ML
0-5 INJECTION, SOLUTION INTRAVENOUS; SUBCUTANEOUS EVERY 6 HOURS PRN
Status: DISCONTINUED | OUTPATIENT
Start: 2022-10-05 | End: 2022-10-07 | Stop reason: HOSPADM

## 2022-10-05 RX ORDER — SUCRALFATE 1 G/10ML
1 SUSPENSION ORAL
Status: COMPLETED | OUTPATIENT
Start: 2022-10-05 | End: 2022-10-05

## 2022-10-05 RX ORDER — ENOXAPARIN SODIUM 100 MG/ML
40 INJECTION SUBCUTANEOUS EVERY 24 HOURS
Status: DISCONTINUED | OUTPATIENT
Start: 2022-10-05 | End: 2022-10-07 | Stop reason: HOSPADM

## 2022-10-05 RX ORDER — MAG HYDROX/ALUMINUM HYD/SIMETH 200-200-20
5 SUSPENSION, ORAL (FINAL DOSE FORM) ORAL
Status: COMPLETED | OUTPATIENT
Start: 2022-10-05 | End: 2022-10-05

## 2022-10-05 RX ORDER — IPRATROPIUM BROMIDE AND ALBUTEROL SULFATE 2.5; .5 MG/3ML; MG/3ML
3 SOLUTION RESPIRATORY (INHALATION) EVERY 6 HOURS PRN
Status: DISCONTINUED | OUTPATIENT
Start: 2022-10-05 | End: 2022-10-07 | Stop reason: HOSPADM

## 2022-10-05 RX ORDER — GLUCAGON 1 MG
1 KIT INJECTION
Status: DISCONTINUED | OUTPATIENT
Start: 2022-10-05 | End: 2022-10-07 | Stop reason: HOSPADM

## 2022-10-05 RX ORDER — IBUPROFEN 200 MG
24 TABLET ORAL
Status: DISCONTINUED | OUTPATIENT
Start: 2022-10-05 | End: 2022-10-07 | Stop reason: HOSPADM

## 2022-10-05 RX ORDER — ONDANSETRON 2 MG/ML
4 INJECTION INTRAMUSCULAR; INTRAVENOUS
Status: COMPLETED | OUTPATIENT
Start: 2022-10-05 | End: 2022-10-05

## 2022-10-05 RX ORDER — VENLAFAXINE HYDROCHLORIDE 75 MG/1
75 CAPSULE, EXTENDED RELEASE ORAL DAILY
Status: DISCONTINUED | OUTPATIENT
Start: 2022-10-06 | End: 2022-10-07 | Stop reason: HOSPADM

## 2022-10-05 RX ORDER — IBUPROFEN 200 MG
16 TABLET ORAL
Status: DISCONTINUED | OUTPATIENT
Start: 2022-10-05 | End: 2022-10-07 | Stop reason: HOSPADM

## 2022-10-05 RX ORDER — HYDROMORPHONE HYDROCHLORIDE 1 MG/ML
0.5 INJECTION, SOLUTION INTRAMUSCULAR; INTRAVENOUS; SUBCUTANEOUS
Status: DISCONTINUED | OUTPATIENT
Start: 2022-10-05 | End: 2022-10-06

## 2022-10-05 RX ORDER — ACETAMINOPHEN 325 MG/1
650 TABLET ORAL EVERY 4 HOURS PRN
Status: DISCONTINUED | OUTPATIENT
Start: 2022-10-05 | End: 2022-10-07 | Stop reason: HOSPADM

## 2022-10-05 RX ORDER — TALC
6 POWDER (GRAM) TOPICAL NIGHTLY PRN
Status: DISCONTINUED | OUTPATIENT
Start: 2022-10-05 | End: 2022-10-07 | Stop reason: HOSPADM

## 2022-10-05 RX ORDER — DICYCLOMINE HYDROCHLORIDE 10 MG/1
10 CAPSULE ORAL 4 TIMES DAILY
Status: DISCONTINUED | OUTPATIENT
Start: 2022-10-05 | End: 2022-10-07 | Stop reason: HOSPADM

## 2022-10-05 RX ORDER — SODIUM CHLORIDE, SODIUM LACTATE, POTASSIUM CHLORIDE, CALCIUM CHLORIDE 600; 310; 30; 20 MG/100ML; MG/100ML; MG/100ML; MG/100ML
INJECTION, SOLUTION INTRAVENOUS CONTINUOUS
Status: DISCONTINUED | OUTPATIENT
Start: 2022-10-05 | End: 2022-10-07

## 2022-10-05 RX ADMIN — DICYCLOMINE HYDROCHLORIDE 10 MG: 10 CAPSULE ORAL at 09:10

## 2022-10-05 RX ADMIN — IOHEXOL 75 ML: 350 INJECTION, SOLUTION INTRAVENOUS at 06:10

## 2022-10-05 RX ADMIN — SODIUM CHLORIDE, SODIUM LACTATE, POTASSIUM CHLORIDE, AND CALCIUM CHLORIDE: .6; .31; .03; .02 INJECTION, SOLUTION INTRAVENOUS at 07:10

## 2022-10-05 RX ADMIN — SUCRALFATE 1 G: 1 SUSPENSION ORAL at 04:10

## 2022-10-05 RX ADMIN — ONDANSETRON 4 MG: 2 INJECTION INTRAMUSCULAR; INTRAVENOUS at 10:10

## 2022-10-05 RX ADMIN — ALUMINUM HYDROXIDE, MAGNESIUM HYDROXIDE, AND SIMETHICONE 5 ML: 200; 200; 20 SUSPENSION ORAL at 02:10

## 2022-10-05 RX ADMIN — MORPHINE SULFATE 4 MG: 4 INJECTION INTRAVENOUS at 07:10

## 2022-10-05 RX ADMIN — HYDROMORPHONE HYDROCHLORIDE 0.5 MG: 1 INJECTION, SOLUTION INTRAMUSCULAR; INTRAVENOUS; SUBCUTANEOUS at 10:10

## 2022-10-05 RX ADMIN — ENOXAPARIN SODIUM 40 MG: 100 INJECTION SUBCUTANEOUS at 08:10

## 2022-10-05 RX ADMIN — ONDANSETRON 4 MG: 2 INJECTION INTRAMUSCULAR; INTRAVENOUS at 02:10

## 2022-10-05 RX ADMIN — MORPHINE SULFATE 4 MG: 4 INJECTION INTRAVENOUS at 04:10

## 2022-10-05 NOTE — ED NOTES
Patient identifiers verified and correct for Ms Rizvi  C/C: Abd pain SEE NN  APPEARANCE: awake and alert in NAD.  SKIN: warm, dry and intact. No breakdown or bruising.  MUSCULOSKELETAL: Patient moving all extremities spontaneously, no obvious swelling or deformities noted. Ambulates independently.  RESPIRATORY: Denies shortness of breath.Respirations unlabored.   CARDIAC: Denies CP, 2+ distal pulses; no peripheral edema  ABDOMEN: ABdomen soft, round, pain to mid upper abdomen positive nausea, no emesis  : voids spontaneously, denies difficulty  Neurologic: AAO x 4; follows commands equal strength in all extremities; denies numbness/tingling. Denies dizziness Denies weakness

## 2022-10-05 NOTE — ED PROVIDER NOTES
"Encounter Date: 10/5/2022       History     Chief Complaint   Patient presents with    Abdominal Pain     C/o abdominal pain onset x2 days ago     The history is provided by the patient and medical records. No  was used.     Joan Rizvi is a 52 y.o. female with medical history of HTN, HLD, T2DM, GERD, Hx of cholecystectomy presenting to the ED with the chief complaint of abdominal pain.     Reports waking up yesterday with epigastric abdominal "stabbing" pain that radiates to her back and worsens with eating. Reports associated nausea without emesis. Reports having Lasagna with red sauce the night before and contributes symptoms to acid reflux. Reports compliance with her Protonix. Last BM yesterday and normal. Reports social ETOH use 1-2x/week. No recreational drug use. Denies fever, chest pain, SOB, cough, diarrhea, constipation, dysuria, hematuria. No melena or hematochezia.    Review of patient's allergies indicates:   Allergen Reactions    Pineapple Swelling    Hydrochlorothiazide Other (See Comments)     CAUSING PANCREATITIS      Past Medical History:   Diagnosis Date    GERD (gastroesophageal reflux disease)     Hyperlipidemia associated with type 2 diabetes mellitus     Hypertension     Mental disorder     Pancreatitis     Type 2 diabetes mellitus     Vitamin D deficiency      Past Surgical History:   Procedure Laterality Date     SECTION      COLONOSCOPY N/A 3/18/2021    Procedure: COLONOSCOPY;  Surgeon: Celestino Walker MD;  Location: [facility] (4TH FLR);  Service: Endoscopy;  Laterality: N/A;  covid screening PCW 3/15 - christen    ENDOMETRIAL ABLATION      ENDOSCOPIC ULTRASOUND OF UPPER GASTROINTESTINAL TRACT N/A 2019    Procedure: ULTRASOUND, UPPER GI TRACT, ENDOSCOPIC;  Surgeon: Kristian Wakefield MD;  Location: [facility] (2ND FLR);  Service: Endoscopy;  Laterality: N/A;  To be done end of  along with EGD evaluation to r/o PUD.    ENDOSCOPIC ULTRASOUND OF UPPER " GASTROINTESTINAL TRACT N/A 11/6/2019    Procedure: ULTRASOUND, UPPER GI TRACT, ENDOSCOPIC;  Surgeon: Po Noonan MD;  Location: [facility] ENDO (2ND FLR);  Service: Endoscopy;  Laterality: N/A;  CT still shows a spot in the pancreas. It has not changed, and likely just represents an area of inflammation, but I think we should take another look with EUS.  Dr SHERLEY Wakefield  PM prep - pg  10/31/19 appt confirmed-rb    ENDOSCOPIC ULTRASOUND OF UPPER GASTROINTESTINAL TRACT N/A 10/15/2021    Procedure: ULTRASOUND, UPPER GI TRACT, ENDOSCOPIC;  Surgeon: Rick Mooney MD;  Location: [facility] ENDO (2ND FLR);  Service: Endoscopy;  Laterality: N/A;  Covid-19 test 10/12 .Eduin-instructions sent via portal 10/5-MG    ESOPHAGOGASTRODUODENOSCOPY N/A 6/24/2019    Procedure: EGD (ESOPHAGOGASTRODUODENOSCOPY);  Surgeon: Kristian Wakefield MD;  Location: [facility] ENDO (2ND FLR);  Service: Endoscopy;  Laterality: N/A;    LAPAROSCOPIC CHOLECYSTECTOMY N/A 7/24/2019    Procedure: CHOLECYSTECTOMY, LAPAROSCOPIC;  Surgeon: Huber Neri MD;  Location: [facility] OR 2ND FLR;  Service: General;  Laterality: N/A;    TUBAL LIGATION  04/1994    upper gi       Family History   Problem Relation Age of Onset    Heart failure Mother     Stroke Mother     Aneurysm Mother     Alcohol abuse Mother     Diabetes Maternal Aunt     Diabetes Maternal Uncle     Heart disease Maternal Uncle     Hypertension Father     Alcohol abuse Father     Liver disease Father     No Known Problems Sister     No Known Problems Maternal Grandmother     No Known Problems Maternal Grandfather     Coronary artery disease Paternal Grandmother     Dementia Paternal Grandmother     Dementia Paternal Grandfather     No Known Problems Sister     No Known Problems Brother     No Known Problems Paternal Aunt     No Known Problems Paternal Uncle     Colon cancer Neg Hx     Esophageal cancer Neg Hx     Ovarian cancer Neg Hx     Breast cancer Neg Hx     Amblyopia Neg Hx     Blindness Neg Hx     Cancer  Neg Hx     Cataracts Neg Hx     Glaucoma Neg Hx     Macular degeneration Neg Hx     Retinal detachment Neg Hx     Strabismus Neg Hx     Thyroid disease Neg Hx      Social History     Tobacco Use    Smoking status: Former     Years: 10.00     Types: Cigarettes    Smokeless tobacco: Never    Tobacco comments:     2017   Substance Use Topics    Alcohol use: Yes     Alcohol/week: 2.0 standard drinks     Types: 2 Glasses of wine per week     Comment: social     Drug use: No     Review of Systems   Constitutional:  Negative for fever.   HENT:  Negative for sore throat.    Eyes:  Negative for pain.   Respiratory:  Negative for shortness of breath.    Cardiovascular:  Negative for chest pain.   Gastrointestinal:  Positive for abdominal pain and nausea.   Genitourinary:  Negative for dysuria.   Musculoskeletal:  Negative for back pain.   Skin:  Negative for rash.   Neurological:  Negative for weakness.   Hematological:  Does not bruise/bleed easily.     Physical Exam     Initial Vitals [10/05/22 1234]   BP Pulse Resp Temp SpO2   136/73 64 18 99.5 °F (37.5 °C) 99 %      MAP       --         Physical Exam    Constitutional: She appears well-developed and well-nourished. She is not diaphoretic. No distress.   HENT:   Head: Normocephalic and atraumatic.   Mouth/Throat: Oropharynx is clear and moist. No oropharyngeal exudate.   Eyes: Conjunctivae and EOM are normal. Pupils are equal, round, and reactive to light. No scleral icterus.   Neck: Neck supple.   Normal range of motion.  Cardiovascular:  Normal rate and regular rhythm.           Pulmonary/Chest: Breath sounds normal. No respiratory distress. She has no wheezes.   Abdominal: Abdomen is soft. She exhibits no distension. There is abdominal tenderness (epigastric).   No CVA tenderness There is no rebound.   Musculoskeletal:         General: No tenderness or edema. Normal range of motion.      Cervical back: Normal range of motion and neck supple.     Neurological: She is  alert and oriented to person, place, and time. She has normal strength. No sensory deficit.   Skin: Skin is warm and dry. No rash noted. No erythema.   Psychiatric: She has a normal mood and affect.       ED Course   Procedures  Labs Reviewed   CBC W/ AUTO DIFFERENTIAL - Abnormal; Notable for the following components:       Result Value    RBC 3.97 (*)     MCV 99 (*)     MCH 33.8 (*)     MPV 8.7 (*)     Lymph # 0.8 (*)     Gran % 78.4 (*)     Lymph % 15.5 (*)     All other components within normal limits    Narrative:     Release to patient->Immediate   COMPREHENSIVE METABOLIC PANEL - Abnormal; Notable for the following components:    Sodium 134 (*)     Glucose 122 (*)     Alkaline Phosphatase 145 (*)     All other components within normal limits    Narrative:     Release to patient->Immediate   URINALYSIS, REFLEX TO URINE CULTURE - Abnormal; Notable for the following components:    Appearance, UA Cloudy (*)     Protein, UA 1+ (*)     Ketones, UA Trace (*)     All other components within normal limits    Narrative:     In and Out Cath as needed it patient unable to void  Specimen Source->Urine   LIPASE - Abnormal; Notable for the following components:    Lipase 103 (*)     All other components within normal limits   URINALYSIS MICROSCOPIC - Abnormal; Notable for the following components:    Hyaline Casts, UA 48 (*)     All other components within normal limits    Narrative:     In and Out Cath as needed it patient unable to void  Specimen Source->Urine   HEMOGLOBIN A1C - Abnormal; Notable for the following components:    Hemoglobin A1C 5.7 (*)     All other components within normal limits    Narrative:     ADD ON A1C PER DR TONIA DE ANDA/ORDER# [id number] @ 20:32      Release to patient->Immediate   HEPATITIS C ANTIBODY    Narrative:     Release to patient->Immediate   HEMOGLOBIN A1C   POCT GLUCOSE MONITORING CONTINUOUS        ECG Results              EKG 12-lead (Final result)  Result time 10/05/22 18:03:00      Final  result by Interface, Lab In [facility] (10/05/22 18:03:00)                   Narrative:    Test Reason : R10.9,    Vent. Rate : 059 BPM     Atrial Rate : 059 BPM     P-R Int : 134 ms          QRS Dur : 078 ms      QT Int : 412 ms       P-R-T Axes : 075 071 069 degrees     QTc Int : 407 ms    Sinus bradycardia  Otherwise normal ECG  When compared with ECG of 22-MAY-2019 14:04,  No significant change was found  Confirmed by Chito GUPTA MD ([id number]) on 10/5/2022 6:02:53 PM    Referred By: MARGO   SELF           Confirmed By:Chito GUPTA MD                                  Imaging Results              CT Abdomen Pelvis With Contrast (Final result)  Result time 10/05/22 19:02:39      Final result by Huber Lion MD (10/05/22 19:02:39)                   Impression:      1. Findings concerning for acute, uncomplicated regional pancreatitis involving the head and neck.  No discrete pancreatic mass; however, given the regional location underlying infiltrative type pancreatic neoplastic process not excluded on the basis of this examination.  Consider follow-up with imaging after acute illness has resolved to exclude underlying neoplasm.  2. Remote cholecystectomy.  3. Suspected hepatic steatosis with multifocal scattered ill-defined areas of parenchymal enhancement suggesting sequela of transient hepatic attenuation differences, noting no discrete hepatic mass seen.  Clinical correlation and with LFTs advised.  4. Minimal colonic diverticulosis without acute diverticulitis.  5. Few additional findings as above.      Electronically signed by: Huber Lion MD  Date:    10/05/2022  Time:    19:02               Narrative:    EXAMINATION:  CT ABDOMEN PELVIS WITH CONTRAST    CLINICAL HISTORY:  Abdominal abscess/infection suspected;Pancreatitis, acute, severe;    TECHNIQUE:  Low dose axial images, sagittal and coronal reformations were obtained from the lung bases to the pubic symphysis following the IV administration of 75 mL of  Omnipaque 350 .  Oral contrast was not given.    COMPARISON:  MRI abdomen 09/24/2021, abdominal ultrasound 07/20/2020 and CT abdomen 08/23/2019    FINDINGS:  Imaged lung bases are clear.  Base of the heart is within normal limits.    Remote cholecystectomy.  Grossly similar mild prominence of the central intrahepatic and extrahepatic biliary ducts.    Liver is normal in size with diffuse parenchymal hypoattenuation and multifocal scattered areas of ill-defined parenchymal enhancement throughout each lobe suggesting sequela of transient hepatic attenuation difference.  Main portal vein appears patent.  Spleen is normal in size noting a few scattered small parenchymal calcified granulomas.  Stomach is mild-to-moderately distended with intraluminal contents without wall thickening or adjacent inflammation.  Duodenum is normal in caliber.  Bilateral adrenal glands are within normal limits.  Suspected tiny accessory splenule along the inferior splenic hilum.    Pancreas is normal in size noting slight heterogeneous hypoattenuating appearance of the pancreatic head and neck with mild stranding about the pancreatic head and neck concerning for sequela of regional acute pancreatitis.  No discrete enhancing pancreatic mass, parenchymal calcification, pseudocyst, significant ductal dilatation or peripancreatic fluid collection seen at this time.  Celiac axis and SMA are patent.  CARRINGTON is patent.  No significant aortic atherosclerosis.  No aortic aneurysm or dissection.    Bilateral kidneys are normal in size, shape and location with symmetric normal enhancement.  No hydronephrosis or significant perinephric stranding.  Few scattered tiny hypoattenuating cortical foci at each kidney which are too small to characterize.  Ureters are nondilated.  Urinary bladder is suboptimally distended.  Uterus and bilateral adnexa are grossly within normal limits.  Pelvic phleboliths noted.  No significant amount of free fluid seen in the  pelvis.    No ascites, free air or lymphadenopathy by CT criteria.    Appendix and terminal ileum are within normal limits.  A few scattered colonic diverticula without acute diverticulitis.  No convincing evidence of bowel obstruction or acute inflammation.  No pneumatosis or portal venous gas.    Osseous structures appear stable without acute process seen.                                       Medications   lactated ringers infusion ( Intravenous New Bag 10/5/22 1947)   sodium chloride 0.9% flush 10 mL (has no administration in time range)   albuterol-ipratropium 2.5 mg-0.5 mg/3 mL nebulizer solution 3 mL (has no administration in time range)   melatonin tablet 6 mg (has no administration in time range)   ondansetron injection 4 mg (has no administration in time range)   prochlorperazine injection Soln 5 mg (has no administration in time range)   acetaminophen tablet 650 mg (has no administration in time range)   naloxone 0.4 mg/mL injection 0.02 mg (has no administration in time range)   glucose chewable tablet 16 g (has no administration in time range)   glucose chewable tablet 24 g (has no administration in time range)   enoxaparin injection 40 mg (has no administration in time range)   amLODIPine tablet 10 mg (has no administration in time range)   venlafaxine 24 hr capsule 75 mg (has no administration in time range)   HYDROmorphone injection 0.5 mg (has no administration in time range)   glucagon (human recombinant) injection 1 mg (has no administration in time range)   dextrose 10% bolus 125 mL (has no administration in time range)   dextrose 10% bolus 250 mL (has no administration in time range)   insulin aspart U-100 pen 0-5 Units (has no administration in time range)   dicyclomine capsule 10 mg (has no administration in time range)   lipase-protease-amylase 24,000-76,000-120,000 units capsule 1 capsule (has no administration in time range)   ondansetron injection 4 mg (4 mg Intravenous Given 10/5/22 9536)    aluminum-magnesium hydroxide-simethicone 200-200-20 mg/5 mL suspension 5 mL (5 mLs Oral Given 10/5/22 1426)   lactated ringers bolus 1,000 mL (0 mLs Intravenous Stopped 10/5/22 1640)   morphine injection 4 mg (4 mg Intravenous Given 10/5/22 1604)   sucralfate 100 mg/mL suspension 1 g (1 g Oral Given 10/5/22 1658)   iohexoL (OMNIPAQUE 350) injection 75 mL (75 mLs Intravenous Given 10/5/22 1820)   morphine injection 4 mg (4 mg Intravenous Given 10/5/22 1943)     Medical Decision Making:   History:   Old Medical Records: I decided to obtain old medical records.  Old Records Summarized: records from clinic visits.  Independently Interpreted Test(s):   I have ordered and independently interpreted EKG Reading(s) - see summary below  Clinical Tests:   Lab Tests: Ordered and Reviewed  Radiological Study: Ordered and Reviewed  Medical Tests: Ordered and Reviewed  Other:   I have discussed this case with another health care provider.       <> Summary of the Discussion: Hospital Medicine     APC / Resident Notes:   52 y.o. female with medical history of HTN, HLD, T2DM, GERD, Hx of cholecystectomy presenting to the ED c/o 2 days of epigastric abdominal with nausea. DDx includes but not limited to GERD, dyspepsia, viral gastroenteritis, pancreatitis, hiatal hernia, PUD. Epigastric tenderness on exam and pain worsens with eating and lower suspicion for ACS. ECG without concerning ischemic changes or arrhythmia.       ED Course as of 10/05/22 2138   Wed Oct 05, 2022   1405 Sinus bradycardia 59 bpm. No STEMI [BA]   1938 Lipase(!): 103 [BA]   1938 WBC: 5.23 [BA]   1938 Creatinine: 0.8 [BA]   1938 UA negative for UTI [BA]   1938 CT showing evidence of acute uncomplicated pancreatitis [BA]   1938 Multimodal regimen of pain control given in the ED. Continues to have epigastric pain with minimal sips of water. Discussed with , placed in observation for further management of pancreatitis and pain control. Patient expresses  understanding and agreeable to the plan.  [BA]      ED Course User Index  [BA] Huber Espinoza PA-C                   Clinical Impression:   Final diagnoses:  [R10.9] Abdominal pain  [K85.90] Acute pancreatitis, unspecified complication status, unspecified pancreatitis type (Primary)        ED Disposition Condition    Observation                 Huber Espinoza PA-C  10/05/22 8392

## 2022-10-06 LAB
ALBUMIN SERPL BCP-MCNC: 3.8 G/DL (ref 3.5–5.2)
ALP SERPL-CCNC: 127 U/L (ref 55–135)
ALT SERPL W/O P-5'-P-CCNC: 27 U/L (ref 10–44)
ANION GAP SERPL CALC-SCNC: 11 MMOL/L (ref 8–16)
AST SERPL-CCNC: 22 U/L (ref 10–40)
BASOPHILS # BLD AUTO: 0.02 K/UL (ref 0–0.2)
BASOPHILS NFR BLD: 0.3 % (ref 0–1.9)
BILIRUB SERPL-MCNC: 0.5 MG/DL (ref 0.1–1)
BUN SERPL-MCNC: 6 MG/DL (ref 6–20)
CALCIUM SERPL-MCNC: 9.6 MG/DL (ref 8.7–10.5)
CHLORIDE SERPL-SCNC: 100 MMOL/L (ref 95–110)
CO2 SERPL-SCNC: 26 MMOL/L (ref 23–29)
CREAT SERPL-MCNC: 0.7 MG/DL (ref 0.5–1.4)
DIFFERENTIAL METHOD: ABNORMAL
EOSINOPHIL # BLD AUTO: 0 K/UL (ref 0–0.5)
EOSINOPHIL NFR BLD: 0 % (ref 0–8)
ERYTHROCYTE [DISTWIDTH] IN BLOOD BY AUTOMATED COUNT: 12.8 % (ref 11.5–14.5)
EST. GFR  (NO RACE VARIABLE): >60 ML/MIN/1.73 M^2
GLUCOSE SERPL-MCNC: 117 MG/DL (ref 70–110)
HCT VFR BLD AUTO: 36.5 % (ref 37–48.5)
HGB BLD-MCNC: 12.6 G/DL (ref 12–16)
IMM GRANULOCYTES # BLD AUTO: 0.01 K/UL (ref 0–0.04)
IMM GRANULOCYTES NFR BLD AUTO: 0.2 % (ref 0–0.5)
LYMPHOCYTES # BLD AUTO: 1.2 K/UL (ref 1–4.8)
LYMPHOCYTES NFR BLD: 21.1 % (ref 18–48)
MCH RBC QN AUTO: 34.1 PG (ref 27–31)
MCHC RBC AUTO-ENTMCNC: 34.5 G/DL (ref 32–36)
MCV RBC AUTO: 99 FL (ref 82–98)
MONOCYTES # BLD AUTO: 0.5 K/UL (ref 0.3–1)
MONOCYTES NFR BLD: 8.5 % (ref 4–15)
NEUTROPHILS # BLD AUTO: 4.1 K/UL (ref 1.8–7.7)
NEUTROPHILS NFR BLD: 69.9 % (ref 38–73)
NRBC BLD-RTO: 0 /100 WBC
PLATELET # BLD AUTO: 296 K/UL (ref 150–450)
PMV BLD AUTO: 9.4 FL (ref 9.2–12.9)
POCT GLUCOSE: 116 MG/DL (ref 70–110)
POCT GLUCOSE: 118 MG/DL (ref 70–110)
POCT GLUCOSE: 118 MG/DL (ref 70–110)
POCT GLUCOSE: 139 MG/DL (ref 70–110)
POTASSIUM SERPL-SCNC: 3.6 MMOL/L (ref 3.5–5.1)
PROT SERPL-MCNC: 7.1 G/DL (ref 6–8.4)
RBC # BLD AUTO: 3.7 M/UL (ref 4–5.4)
SODIUM SERPL-SCNC: 137 MMOL/L (ref 136–145)
WBC # BLD AUTO: 5.88 K/UL (ref 3.9–12.7)

## 2022-10-06 PROCEDURE — 96372 THER/PROPH/DIAG INJ SC/IM: CPT | Performed by: HOSPITALIST

## 2022-10-06 PROCEDURE — 25000003 PHARM REV CODE 250

## 2022-10-06 PROCEDURE — 25000003 PHARM REV CODE 250: Performed by: PHYSICIAN ASSISTANT

## 2022-10-06 PROCEDURE — 85025 COMPLETE CBC W/AUTO DIFF WBC: CPT | Performed by: HOSPITALIST

## 2022-10-06 PROCEDURE — 99226 PR SUBSEQUENT OBSERVATION CARE,LEVEL III: CPT | Mod: ,,,

## 2022-10-06 PROCEDURE — 25000003 PHARM REV CODE 250: Performed by: HOSPITALIST

## 2022-10-06 PROCEDURE — G0378 HOSPITAL OBSERVATION PER HR: HCPCS

## 2022-10-06 PROCEDURE — 96361 HYDRATE IV INFUSION ADD-ON: CPT

## 2022-10-06 PROCEDURE — 36415 COLL VENOUS BLD VENIPUNCTURE: CPT | Performed by: HOSPITALIST

## 2022-10-06 PROCEDURE — 96376 TX/PRO/DX INJ SAME DRUG ADON: CPT

## 2022-10-06 PROCEDURE — 63600175 PHARM REV CODE 636 W HCPCS: Performed by: HOSPITALIST

## 2022-10-06 PROCEDURE — 99226 PR SUBSEQUENT OBSERVATION CARE,LEVEL III: ICD-10-PCS | Mod: ,,,

## 2022-10-06 PROCEDURE — 80053 COMPREHEN METABOLIC PANEL: CPT | Performed by: HOSPITALIST

## 2022-10-06 RX ORDER — OXYCODONE HYDROCHLORIDE 5 MG/1
5 TABLET ORAL EVERY 6 HOURS PRN
Status: DISCONTINUED | OUTPATIENT
Start: 2022-10-06 | End: 2022-10-07 | Stop reason: HOSPADM

## 2022-10-06 RX ORDER — CALCIUM CARBONATE 200(500)MG
500 TABLET,CHEWABLE ORAL 3 TIMES DAILY PRN
Status: DISCONTINUED | OUTPATIENT
Start: 2022-10-06 | End: 2022-10-07 | Stop reason: HOSPADM

## 2022-10-06 RX ORDER — HYDROMORPHONE HYDROCHLORIDE 1 MG/ML
0.5 INJECTION, SOLUTION INTRAMUSCULAR; INTRAVENOUS; SUBCUTANEOUS EVERY 6 HOURS PRN
Status: DISCONTINUED | OUTPATIENT
Start: 2022-10-06 | End: 2022-10-07 | Stop reason: HOSPADM

## 2022-10-06 RX ORDER — OXYCODONE HYDROCHLORIDE 5 MG/1
5 TABLET ORAL EVERY 4 HOURS PRN
Status: DISCONTINUED | OUTPATIENT
Start: 2022-10-06 | End: 2022-10-06

## 2022-10-06 RX ORDER — ONDANSETRON 4 MG/1
4 TABLET, FILM COATED ORAL EVERY 8 HOURS PRN
Status: DISCONTINUED | OUTPATIENT
Start: 2022-10-06 | End: 2022-10-07 | Stop reason: HOSPADM

## 2022-10-06 RX ADMIN — PANCRELIPASE 1 CAPSULE: 24000; 76000; 120000 CAPSULE, DELAYED RELEASE PELLETS ORAL at 04:10

## 2022-10-06 RX ADMIN — SODIUM CHLORIDE, SODIUM LACTATE, POTASSIUM CHLORIDE, AND CALCIUM CHLORIDE: .6; .31; .03; .02 INJECTION, SOLUTION INTRAVENOUS at 01:10

## 2022-10-06 RX ADMIN — CALCIUM CARBONATE (ANTACID) CHEW TAB 500 MG 500 MG: 500 CHEW TAB at 01:10

## 2022-10-06 RX ADMIN — DICYCLOMINE HYDROCHLORIDE 10 MG: 10 CAPSULE ORAL at 04:10

## 2022-10-06 RX ADMIN — AMLODIPINE BESYLATE 10 MG: 10 TABLET ORAL at 08:10

## 2022-10-06 RX ADMIN — ONDANSETRON HYDROCHLORIDE 4 MG: 4 TABLET, FILM COATED ORAL at 01:10

## 2022-10-06 RX ADMIN — HYDROMORPHONE HYDROCHLORIDE 0.5 MG: 1 INJECTION, SOLUTION INTRAMUSCULAR; INTRAVENOUS; SUBCUTANEOUS at 01:10

## 2022-10-06 RX ADMIN — VENLAFAXINE HYDROCHLORIDE 75 MG: 75 CAPSULE, EXTENDED RELEASE ORAL at 08:10

## 2022-10-06 RX ADMIN — HYDROMORPHONE HYDROCHLORIDE 0.5 MG: 1 INJECTION, SOLUTION INTRAMUSCULAR; INTRAVENOUS; SUBCUTANEOUS at 04:10

## 2022-10-06 RX ADMIN — OXYCODONE 5 MG: 5 TABLET ORAL at 01:10

## 2022-10-06 RX ADMIN — PANCRELIPASE 1 CAPSULE: 24000; 76000; 120000 CAPSULE, DELAYED RELEASE PELLETS ORAL at 08:10

## 2022-10-06 RX ADMIN — DICYCLOMINE HYDROCHLORIDE 10 MG: 10 CAPSULE ORAL at 08:10

## 2022-10-06 RX ADMIN — OXYCODONE 5 MG: 5 TABLET ORAL at 08:10

## 2022-10-06 RX ADMIN — ONDANSETRON 4 MG: 2 INJECTION INTRAMUSCULAR; INTRAVENOUS at 06:10

## 2022-10-06 RX ADMIN — ENOXAPARIN SODIUM 40 MG: 100 INJECTION SUBCUTANEOUS at 04:10

## 2022-10-06 RX ADMIN — PANCRELIPASE 1 CAPSULE: 24000; 76000; 120000 CAPSULE, DELAYED RELEASE PELLETS ORAL at 01:10

## 2022-10-06 RX ADMIN — HYDROMORPHONE HYDROCHLORIDE 0.5 MG: 1 INJECTION, SOLUTION INTRAMUSCULAR; INTRAVENOUS; SUBCUTANEOUS at 09:10

## 2022-10-06 RX ADMIN — DICYCLOMINE HYDROCHLORIDE 10 MG: 10 CAPSULE ORAL at 01:10

## 2022-10-06 NOTE — ASSESSMENT & PLAN NOTE
Pt. with epigastric pain radiating to back and nausea. Pt endorses previous episodes  -CT abdomen shows findings concerning for acute, uncomplicated regional pancreatitis involving the head and neck as well as hepatic steatosis  -Etiology unclear, pt. Reports alcohol use which is likely contributing. Counseled on cessation.  - IV fluids  - CLD>> transitioned to full liquid  - PO PRN pain medications  - bentyl 4x daily  -Continue to monitor for symptomatic improvement

## 2022-10-06 NOTE — ASSESSMENT & PLAN NOTE
-Pt. With epigastric pain rdaiting to back and nausea. CT abdomen shows Findings concerning for acute, uncomplicated regional pancreatitis involving the head and neck as well as hepatic steatosis  -Etiology unclear, pt. Reports alcohol use which is likely contributing. Counseled on cessation.  -IV fluids, bowel rest with clear liquid diet, and pain meds PRN  -Continue to monitor for symptomatic improvement

## 2022-10-06 NOTE — SUBJECTIVE & OBJECTIVE
Interval History: NAEON. AF, VSS. 150 ml/hr continuous fluids. Pain managed on IV meds. Denied any nausea or vomiting with clear liquid diet. Will transition to full liquids with PO pain meds.     Review of Systems   Constitutional:  Positive for appetite change. Negative for activity change, chills, fever and unexpected weight change.   HENT:  Negative for congestion and sore throat.    Respiratory:  Negative for cough, shortness of breath and wheezing.    Cardiovascular:  Negative for chest pain, palpitations and leg swelling.   Gastrointestinal:  Positive for abdominal pain. Negative for abdominal distention, blood in stool, constipation, diarrhea, nausea and vomiting.   Genitourinary:  Negative for difficulty urinating, dysuria, frequency, hematuria and urgency.   Musculoskeletal:  Positive for back pain (improving). Negative for arthralgias and myalgias.   Skin:  Negative for color change and rash.   Neurological:  Negative for dizziness, tremors and seizures.   Objective:     Vital Signs (Most Recent):  Temp: 97 °F (36.1 °C) (10/06/22 1121)  Pulse: 77 (10/06/22 1121)  Resp: 18 (10/06/22 1336)  BP: (!) 142/75 (10/06/22 1121)  SpO2: 98 % (10/06/22 1121)   Vital Signs (24h Range):  Temp:  [97 °F (36.1 °C)-99 °F (37.2 °C)] 97 °F (36.1 °C)  Pulse:  [60-77] 77  Resp:  [16-18] 18  SpO2:  [92 %-100 %] 98 %  BP: (141-171)/(42-84) 142/75     Weight: 66.7 kg (147 lb)  Body mass index is 24.46 kg/m².  No intake or output data in the 24 hours ending 10/06/22 1344   Physical Exam  Vitals reviewed.   Constitutional:       General: She is not in acute distress.     Appearance: She is well-developed.   HENT:      Head: Normocephalic and atraumatic.   Eyes:      Extraocular Movements: Extraocular movements intact.      Pupils: Pupils are equal, round, and reactive to light.   Neck:      Vascular: No JVD.      Trachea: No tracheal deviation.   Cardiovascular:      Rate and Rhythm: Normal rate and regular rhythm.      Heart  sounds: No murmur heard.    No friction rub. No gallop.   Pulmonary:      Effort: No respiratory distress.      Breath sounds: Normal breath sounds. No wheezing or rales.   Abdominal:      General: Bowel sounds are normal. There is no distension.      Palpations: Abdomen is soft. There is no mass.      Tenderness: There is no abdominal tenderness.      Comments: TTP in epigastrium    Musculoskeletal:         General: No deformity.      Cervical back: Neck supple.   Lymphadenopathy:      Cervical: No cervical adenopathy.   Skin:     General: Skin is warm and dry.      Findings: No rash.   Neurological:      Mental Status: She is alert and oriented to person, place, and time.       Significant Labs: All pertinent labs within the past 24 hours have been reviewed.  BMP:   Recent Labs   Lab 10/06/22  0312   *      K 3.6      CO2 26   BUN 6   CREATININE 0.7   CALCIUM 9.6     CBC:   Recent Labs   Lab 10/05/22  1340 10/06/22  0312   WBC 5.23 5.88   HGB 13.4 12.6   HCT 39.2 36.5*    296     Lipase:   Recent Labs   Lab 10/05/22  1412   LIPASE 103*       Significant Imaging: I have reviewed all pertinent imaging results/findings within the past 24 hours.    Imaging Results              CT Abdomen Pelvis With Contrast (Final result)  Result time 10/05/22 19:02:39      Final result by Huber Lion MD (10/05/22 19:02:39)                   Impression:      1. Findings concerning for acute, uncomplicated regional pancreatitis involving the head and neck.  No discrete pancreatic mass; however, given the regional location underlying infiltrative type pancreatic neoplastic process not excluded on the basis of this examination.  Consider follow-up with imaging after acute illness has resolved to exclude underlying neoplasm.  2. Remote cholecystectomy.  3. Suspected hepatic steatosis with multifocal scattered ill-defined areas of parenchymal enhancement suggesting sequela of transient hepatic attenuation  differences, noting no discrete hepatic mass seen.  Clinical correlation and with LFTs advised.  4. Minimal colonic diverticulosis without acute diverticulitis.  5. Few additional findings as above.      Electronically signed by: Huber Lion MD  Date:    10/05/2022  Time:    19:02               Narrative:    EXAMINATION:  CT ABDOMEN PELVIS WITH CONTRAST    CLINICAL HISTORY:  Abdominal abscess/infection suspected;Pancreatitis, acute, severe;    TECHNIQUE:  Low dose axial images, sagittal and coronal reformations were obtained from the lung bases to the pubic symphysis following the IV administration of 75 mL of Omnipaque 350 .  Oral contrast was not given.    COMPARISON:  MRI abdomen 09/24/2021, abdominal ultrasound 07/20/2020 and CT abdomen 08/23/2019    FINDINGS:  Imaged lung bases are clear.  Base of the heart is within normal limits.    Remote cholecystectomy.  Grossly similar mild prominence of the central intrahepatic and extrahepatic biliary ducts.    Liver is normal in size with diffuse parenchymal hypoattenuation and multifocal scattered areas of ill-defined parenchymal enhancement throughout each lobe suggesting sequela of transient hepatic attenuation difference.  Main portal vein appears patent.  Spleen is normal in size noting a few scattered small parenchymal calcified granulomas.  Stomach is mild-to-moderately distended with intraluminal contents without wall thickening or adjacent inflammation.  Duodenum is normal in caliber.  Bilateral adrenal glands are within normal limits.  Suspected tiny accessory splenule along the inferior splenic hilum.    Pancreas is normal in size noting slight heterogeneous hypoattenuating appearance of the pancreatic head and neck with mild stranding about the pancreatic head and neck concerning for sequela of regional acute pancreatitis.  No discrete enhancing pancreatic mass, parenchymal calcification, pseudocyst, significant ductal dilatation or peripancreatic fluid  collection seen at this time.  Celiac axis and SMA are patent.  CARRINGTON is patent.  No significant aortic atherosclerosis.  No aortic aneurysm or dissection.    Bilateral kidneys are normal in size, shape and location with symmetric normal enhancement.  No hydronephrosis or significant perinephric stranding.  Few scattered tiny hypoattenuating cortical foci at each kidney which are too small to characterize.  Ureters are nondilated.  Urinary bladder is suboptimally distended.  Uterus and bilateral adnexa are grossly within normal limits.  Pelvic phleboliths noted.  No significant amount of free fluid seen in the pelvis.    No ascites, free air or lymphadenopathy by CT criteria.    Appendix and terminal ileum are within normal limits.  A few scattered colonic diverticula without acute diverticulitis.  No convincing evidence of bowel obstruction or acute inflammation.  No pneumatosis or portal venous gas.    Osseous structures appear stable without acute process seen.

## 2022-10-06 NOTE — ED NOTES
Pt sitting in chair, respirations even, unlabored, NAD noted, answering questions appropriately. Pt updated on plan of care, will continue to monitor.

## 2022-10-06 NOTE — H&P
[facility] - Emergency Dept  [facility] Medicine  History & Physical    Patient Name: Joan Rizvi  MRN: [id number]  Patient Class: OP- Observation  Admission Date: 10/5/2022  Attending Physician: Bienvenido Salinas MD   Primary Care Provider: Zeynep Salgado MD         Patient information was obtained from patient, past medical records and ER records.     Subjective:     Principal Problem:Acute on chronic pancreatitis    Chief Complaint:   Chief Complaint   Patient presents with    Abdominal Pain     C/o abdominal pain onset x2 days ago        HPI: 51 yo F with PMHx recurrent pancreatitis and HTN who presents to the ED complaining of abdominal pain x 2 days. Pt. Reports epigastric sharp abdominal pain with radiation to her back which started yesterday morning. Pt. Reports pain is worse when eating, and she has not been able to tolerate much PO intake due to pain and nausea. She denies any vomiting, fevers, chills, lightheadedness, or palpitations. Pt. Has had recurrent issues with pancreatitis and has been worked up by GI in the past without clear etiology. Biopsy of lesion on pancreatic tail from 10/2019 showed fat necrosis, no evidence of malignancy.      Past Medical History:   Diagnosis Date    GERD (gastroesophageal reflux disease)     Hyperlipidemia associated with type 2 diabetes mellitus     Hypertension     Mental disorder     Pancreatitis     Type 2 diabetes mellitus     Vitamin D deficiency        Past Surgical History:   Procedure Laterality Date     SECTION      COLONOSCOPY N/A 3/18/2021    Procedure: COLONOSCOPY;  Surgeon: Celestino Walker MD;  Location: [facility] (4TH FLR);  Service: Endoscopy;  Laterality: N/A;  covid screening PCW 3/15 - christen    ENDOMETRIAL ABLATION      ENDOSCOPIC ULTRASOUND OF UPPER GASTROINTESTINAL TRACT N/A 2019    Procedure: ULTRASOUND, UPPER GI TRACT, ENDOSCOPIC;  Surgeon: Kristian Wakefield MD;  Location: [facility] (2ND FLR);  Service: Endoscopy;   Laterality: N/A;  To be done end of June along with EGD evaluation to r/o PUD.    ENDOSCOPIC ULTRASOUND OF UPPER GASTROINTESTINAL TRACT N/A 11/6/2019    Procedure: ULTRASOUND, UPPER GI TRACT, ENDOSCOPIC;  Surgeon: Po Noonan MD;  Location: [facility] (2ND FLR);  Service: Endoscopy;  Laterality: N/A;  CT still shows a spot in the pancreas. It has not changed, and likely just represents an area of inflammation, but I think we should take another look with EUS.  Dr SHERLEY Wakefield  PM prep - pg  10/31/19 appt confirmed-rb    ENDOSCOPIC ULTRASOUND OF UPPER GASTROINTESTINAL TRACT N/A 10/15/2021    Procedure: ULTRASOUND, UPPER GI TRACT, ENDOSCOPIC;  Surgeon: Rick Mooney MD;  [facility]  Service: Endoscopy;  Laterality: N/A;  Covid-19 test 10/12 [facility]-instructions sent via portal 10/5-MG    ESOPHAGOGASTRODUODENOSCOPY N/A 6/24/2019    Procedure: EGD (ESOPHAGOGASTRODUODENOSCOPY);  Surgeon: Kristian Wakefield MD;  Location: [facility] (2ND FLR);  Service: Endoscopy;  Laterality: N/A;    LAPAROSCOPIC CHOLECYSTECTOMY N/A 7/24/2019    Procedure: CHOLECYSTECTOMY, LAPAROSCOPIC;  Surgeon: Huber Neri MD;  [facility];  Service: General;  Laterality: N/A;    TUBAL LIGATION  04/1994    upper gi         Review of patient's allergies indicates:   Allergen Reactions    Pineapple Swelling    Hydrochlorothiazide Other (See Comments)     CAUSING PANCREATITIS        Current Facility-Administered Medications on File Prior to Encounter   Medication    0.9%  NaCl infusion    sodium chloride 0.9% flush 10 mL     Current Outpatient Medications on File Prior to Encounter   Medication Sig    amLODIPine (NORVASC) 10 MG tablet Take 1 tablet (10 mg total) by mouth once daily.    atorvastatin (LIPITOR) 20 MG tablet Take 1 tablet (20 mg total) by mouth once daily.    meloxicam (MOBIC) 15 MG tablet Take 1 tablet (15 mg total) by mouth daily as needed (Pain and inflammation.).    metFORMIN  (GLUCOPHAGE-XR) 500 MG ER 24hr tablet Take 1 tablet (500 mg total) by mouth 2 (two) times a day.    pantoprazole (PROTONIX) 40 MG tablet Take 1 tablet (40 mg total) by mouth once daily.    alcohol swabs (ALCOHOL WIPES) PadM Monitor as directed. Per insurance coverage.    blood sugar diagnostic Strp Monitor as directed twice daily. Per insurance coverage. (Patient taking differently: Monitor as directed twice daily. Per insurance coverage.)    blood-glucose meter kit Monitor as directed. Per insurance coverage.    cetirizine (ZYRTEC) 10 MG tablet TAKE 1 TABLET BY MOUTH EVERY DAY AS NEEDED FOR ALLERGY    chlorhexidine (PERIDEX) 0.12 % solution SMARTSIG:15 By Mouth 3 Times Daily    dicyclomine (BENTYL) 10 MG capsule Take 10 mg by mouth.    ergocalciferol (ERGOCALCIFEROL) 50,000 unit Cap Take 1 capsule (50,000 Units total) by mouth every 7 days.    fluticasone propionate (FLONASE) 50 mcg/actuation nasal spray Instill 2 sprays (100 mcg total) in each nostril 2 (two) times daily as needed for Rhinitis or Allergies.    lancets Misc Per insurance coverage.    lipase-protease-amylase 24,000-76,000-120,000 units (CREON) 24,000-76,000 -120,000 unit capsule Take 2 capsules by mouth 4 (four) times daily with meals and nightly.    senna-docusate 8.6-50 mg (PERICOLACE) 8.6-50 mg per tablet Take 1 tablet by mouth 2 (two) times daily as needed.    sucralfate (CARAFATE) 1 gram tablet Take 1 tablet (1 g total) by mouth 3 (three) times daily as needed (dyspepsia).    venlafaxine (EFFEXOR-XR) 75 MG 24 hr capsule Take 1 capsule (75 mg total) by mouth once daily.    zolpidem (AMBIEN) 5 MG Tab Take 1 tablet (5 mg total) by mouth nightly as needed (Insomnia.).     Family History       Problem Relation (Age of Onset)    Alcohol abuse Mother, Father    Aneurysm Mother    Coronary artery disease Paternal Grandmother    Dementia Paternal Grandmother, Paternal Grandfather    Diabetes Maternal Aunt, Maternal Uncle    Heart disease  Maternal Uncle    Heart failure Mother    Hypertension Father    Liver disease Father    No Known Problems Sister, Maternal Grandmother, Maternal Grandfather, Sister, Brother, Paternal Aunt, Paternal Uncle    Stroke Mother          Tobacco Use    Smoking status: Former     Years: 10.00     Types: Cigarettes    Smokeless tobacco: Never    Tobacco comments:     2017   Substance and Sexual Activity    Alcohol use: Yes     Alcohol/week: 2.0 standard drinks     Types: 2 Glasses of wine per week     Comment: social     Drug use: No    Sexual activity: Yes     Partners: Male     Birth control/protection: See Surgical Hx     Comment: tubal ligation in 1994     Review of Systems   Constitutional:  Positive for appetite change. Negative for activity change, chills, fever and unexpected weight change.   HENT:  Negative for congestion and sore throat.    Respiratory:  Negative for cough and shortness of breath.    Cardiovascular:  Negative for chest pain, palpitations and leg swelling.   Gastrointestinal:  Positive for abdominal pain. Negative for abdominal distention, blood in stool, constipation, diarrhea, nausea and vomiting.   Genitourinary:  Negative for difficulty urinating, dysuria and hematuria.   Musculoskeletal:  Positive for back pain. Negative for arthralgias and myalgias.   Skin:  Negative for color change and rash.   Neurological:  Negative for dizziness, tremors and seizures.   Objective:     Vital Signs (Most Recent):  Temp: 98.3 °F (36.8 °C) (10/05/22 1827)  Pulse: 64 (10/05/22 1827)  Resp: 18 (10/05/22 1943)  BP: (!) 171/76 (10/05/22 1827)  SpO2: 99 % (10/05/22 1827)   Vital Signs (24h Range):  Temp:  [97.8 °F (36.6 °C)-99.5 °F (37.5 °C)] 98.3 °F (36.8 °C)  Pulse:  [60-64] 64  Resp:  [16-18] 18  SpO2:  [98 %-100 %] 99 %  BP: (136-171)/(42-84) 171/76     Weight: 66.7 kg (147 lb)  Body mass index is 24.46 kg/m².    Physical Exam  Vitals reviewed.   Constitutional:       General: She is not in acute  distress.     Appearance: She is well-developed.   HENT:      Head: Normocephalic and atraumatic.   Eyes:      Extraocular Movements: Extraocular movements intact.      Pupils: Pupils are equal, round, and reactive to light.   Neck:      Vascular: No JVD.      Trachea: No tracheal deviation.   Cardiovascular:      Rate and Rhythm: Normal rate and regular rhythm.      Heart sounds: No murmur heard.    No friction rub. No gallop.   Pulmonary:      Effort: No respiratory distress.      Breath sounds: Normal breath sounds. No wheezing or rales.   Abdominal:      General: Bowel sounds are normal. There is no distension.      Palpations: Abdomen is soft. There is no mass.      Tenderness: There is no abdominal tenderness.      Comments: Moderate TTP in epigastric region and RUQ   Musculoskeletal:         General: No deformity.      Cervical back: Neck supple.   Lymphadenopathy:      Cervical: No cervical adenopathy.   Skin:     General: Skin is warm and dry.      Findings: No rash.   Neurological:      Mental Status: She is alert and oriented to person, place, and time.       CRANIAL NERVES     CN III, IV, VI   Pupils are equal, round, and reactive to light.     Significant Labs: All pertinent labs within the past 24 hours have been reviewed.    Significant Imaging: I have reviewed all pertinent imaging results/findings within the past 24 hours.    Assessment/Plan:     Acute on chronic pancreatitis  -Pt. With epigastric pain rdaiting to back and nausea. CT abdomen shows Findings concerning for acute, uncomplicated regional pancreatitis involving the head and neck as well as hepatic steatosis  -Etiology unclear, pt. Reports only occasional which is likely contributing but does not seem to be primary etiology. Counseled on cessation.  -IV fluids, bowel rest with clear liquid diet, and pain meds PRN  -Continue to monitor for symptomatic improvement      Type 2 diabetes mellitus, without long-term current use of insulin  -A1c  ordered and pending. Home regimen metformin 500 mg BID  -Hold PO meds while admitted, SSI ordered    Essential hypertension  -Continue home amlodipine        VTE Risk Mitigation (From admission, onward)         Ordered     enoxaparin injection 40 mg  Daily         10/05/22 1941     IP VTE HIGH RISK PATIENT  Once         10/05/22 1941                   Braden Akers MD  Department of Hospital Medicine   [facility] - Emergency Dept

## 2022-10-06 NOTE — ASSESSMENT & PLAN NOTE
-A1c ordered and pending. Home regimen metformin 500 mg BID  -Hold PO meds while admitted, SSI ordered

## 2022-10-06 NOTE — PLAN OF CARE
[facility] - Observation 11H  Discharge Assessment    Primary Care Provider: Zeynep Salgado MD     Discharge Assessment (most recent)       BRIEF DISCHARGE ASSESSMENT - 10/06/22 1142          Discharge Planning    Assessment Type Discharge Planning Brief Assessment     Resource/Environmental Concerns none     Support Systems Family members;Children;Spouse/significant other     Equipment Currently Used at Home none     Current Living Arrangements home/apartment/condo     Patient/Family Anticipates Transition to home     Patient/Family Anticipated Services at Transition none     DME Needed Upon Discharge  none     Discharge Plan A Home     Discharge Plan B Home        Physical Activity    On average, how many days per week do you engage in moderate to strenuous exercise (like a brisk walk)? 3 days     On average, how many minutes do you engage in exercise at this level? 30 min        Financial Resource Strain    How hard is it for you to pay for the very basics like food, housing, medical care, and heating? Not very hard        Housing Stability    In the last 12 months, was there a time when you were not able to pay the mortgage or rent on time? Yes     In the last 12 months, how many places have you lived? 1     In the last 12 months, was there a time when you did not have a steady place to sleep or slept in a shelter (including now)? Yes        Transportation Needs    In the past 12 months, has lack of transportation kept you from medical appointments or from getting medications? No     In the past 12 months, has lack of transportation kept you from meetings, work, or from getting things needed for daily living? No        Food Insecurity    Within the past 12 months, you worried that your food would run out before you got the money to buy more. Never true     Within the past 12 months, the food you bought just didn't last and you didn't have money to get more. Sometimes true        Stress    Do you feel  stress - tense, restless, nervous, or anxious, or unable to sleep at night because your mind is troubled all the time - these days? To some extent        Social Connections    In a typical week, how many times do you talk on the phone with family, friends, or neighbors? Once a week     How often do you get together with friends or relatives? Once a week     How often do you attend [employer] or Worship services? More than 4 times per year     Do you belong to any clubs or organizations such as [employer] groups, unions, fraternal or athletic groups, or school groups? Yes     How often do you attend meetings of the clubs or organizations you belong to? 1 to 4 times per year     Are you , , , , never , or living with a partner? Living with partner        Alcohol Use    Q1: How often do you have a drink containing alcohol? 2-4 times a month     Q2: How many drinks containing alcohol do you have on a typical day when you are drinking? 1 or 2     Q3: How often do you have six or more drinks on one occasion? Never                   Pt is a 52 y.o. female admitted with Acute Chronic Pancreatitis and has a PMH of pancreatitis and HTN. She lives with a SO in a 2nd floor apartment. She has no issues climbing the stairs. She is independent with all of her ADLs and IADLs and has not required DME in the past. [facility] given to patient and/or family with understanding verbalized.   name and number and estimated discharge date written on white board in patient's room with request to call for any questions or concerns.  Will continue to follow for needs.  Po Matthews RN,BSN

## 2022-10-06 NOTE — SUBJECTIVE & OBJECTIVE
Past Medical History:   Diagnosis Date    GERD (gastroesophageal reflux disease)     Hyperlipidemia associated with type 2 diabetes mellitus     Hypertension     Mental disorder     Pancreatitis     Type 2 diabetes mellitus     Vitamin D deficiency        Past Surgical History:   Procedure Laterality Date     SECTION      COLONOSCOPY N/A 3/18/2021    Procedure: COLONOSCOPY;  Surgeon: Celestino Walker MD;  Location: [facility] ENDO (4TH FLR);  Service: Endoscopy;  Laterality: N/A;  covid screening PCW 3/15 - christen    ENDOMETRIAL ABLATION      ENDOSCOPIC ULTRASOUND OF UPPER GASTROINTESTINAL TRACT N/A 2019    Procedure: ULTRASOUND, UPPER GI TRACT, ENDOSCOPIC;  Surgeon: Kristian Wakefield MD;  Location: [facility] ENDO (2ND FLR);  Service: Endoscopy;  Laterality: N/A;  To be done end of  along with EGD evaluation to r/o PUD.    ENDOSCOPIC ULTRASOUND OF UPPER GASTROINTESTINAL TRACT N/A 2019    Procedure: ULTRASOUND, UPPER GI TRACT, ENDOSCOPIC;  Surgeon: Po Noonan MD;  Location: [facility] ENDO (2ND FLR);  Service: Endoscopy;  Laterality: N/A;  CT still shows a spot in the pancreas. It has not changed, and likely just represents an area of inflammation, but I think we should take another look with EUS.  Dr SHERLEY Wakefield  PM prep - pg  10/31/19 appt confirmed-rb    ENDOSCOPIC ULTRASOUND OF UPPER GASTROINTESTINAL TRACT N/A 10/15/2021    Procedure: ULTRASOUND, UPPER GI TRACT, ENDOSCOPIC;  Surgeon: Rick Mooney MD;  Location: [facility] ENDO (2ND FLR);  Service: Endoscopy;  Laterality: N/A;  Covid-19 test 10/12 [facility]-instructions sent via portal 10/5-MG    ESOPHAGOGASTRODUODENOSCOPY N/A 2019    Procedure: EGD (ESOPHAGOGASTRODUODENOSCOPY);  Surgeon: Kristian Wakefield MD;  Location: [facility] ENDO (2ND FLR);  Service: Endoscopy;  Laterality: N/A;    LAPAROSCOPIC CHOLECYSTECTOMY N/A 2019    Procedure: CHOLECYSTECTOMY, LAPAROSCOPIC;  Surgeon: Huber Neri MD;  Location: [facility] OR 2ND FLR;  Service: General;  Laterality:  N/A;    TUBAL LIGATION  04/1994    upper gi         Review of patient's allergies indicates:   Allergen Reactions    Pineapple Swelling    Hydrochlorothiazide Other (See Comments)     CAUSING PANCREATITIS        Current Facility-Administered Medications on File Prior to Encounter   Medication    0.9%  NaCl infusion    sodium chloride 0.9% flush 10 mL     Current Outpatient Medications on File Prior to Encounter   Medication Sig    amLODIPine (NORVASC) 10 MG tablet Take 1 tablet (10 mg total) by mouth once daily.    atorvastatin (LIPITOR) 20 MG tablet Take 1 tablet (20 mg total) by mouth once daily.    meloxicam (MOBIC) 15 MG tablet Take 1 tablet (15 mg total) by mouth daily as needed (Pain and inflammation.).    metFORMIN (GLUCOPHAGE-XR) 500 MG ER 24hr tablet Take 1 tablet (500 mg total) by mouth 2 (two) times a day.    pantoprazole (PROTONIX) 40 MG tablet Take 1 tablet (40 mg total) by mouth once daily.    alcohol swabs (ALCOHOL WIPES) PadM Monitor as directed. Per insurance coverage.    blood sugar diagnostic Strp Monitor as directed twice daily. Per insurance coverage. (Patient taking differently: Monitor as directed twice daily. Per insurance coverage.)    blood-glucose meter kit Monitor as directed. Per insurance coverage.    cetirizine (ZYRTEC) 10 MG tablet TAKE 1 TABLET BY MOUTH EVERY DAY AS NEEDED FOR ALLERGY    chlorhexidine (PERIDEX) 0.12 % solution SMARTSIG:15 By Mouth 3 Times Daily    dicyclomine (BENTYL) 10 MG capsule Take 10 mg by mouth.    ergocalciferol (ERGOCALCIFEROL) 50,000 unit Cap Take 1 capsule (50,000 Units total) by mouth every 7 days.    fluticasone propionate (FLONASE) 50 mcg/actuation nasal spray Instill 2 sprays (100 mcg total) in each nostril 2 (two) times daily as needed for Rhinitis or Allergies.    lancets Misc Per insurance coverage.    lipase-protease-amylase 24,000-76,000-120,000 units (CREON) 24,000-76,000 -120,000 unit capsule Take 2 capsules by mouth 4 (four) times daily with  meals and nightly.    senna-docusate 8.6-50 mg (PERICOLACE) 8.6-50 mg per tablet Take 1 tablet by mouth 2 (two) times daily as needed.    sucralfate (CARAFATE) 1 gram tablet Take 1 tablet (1 g total) by mouth 3 (three) times daily as needed (dyspepsia).    venlafaxine (EFFEXOR-XR) 75 MG 24 hr capsule Take 1 capsule (75 mg total) by mouth once daily.    zolpidem (AMBIEN) 5 MG Tab Take 1 tablet (5 mg total) by mouth nightly as needed (Insomnia.).     Family History       Problem Relation (Age of Onset)    Alcohol abuse Mother, Father    Aneurysm Mother    Coronary artery disease Paternal Grandmother    Dementia Paternal Grandmother, Paternal Grandfather    Diabetes Maternal Aunt, Maternal Uncle    Heart disease Maternal Uncle    Heart failure Mother    Hypertension Father    Liver disease Father    No Known Problems Sister, Maternal Grandmother, Maternal Grandfather, Sister, Brother, Paternal Aunt, Paternal Uncle    Stroke Mother          Tobacco Use    Smoking status: Former     Years: 10.00     Types: Cigarettes    Smokeless tobacco: Never    Tobacco comments:     2017   Substance and Sexual Activity    Alcohol use: Yes     Alcohol/week: 2.0 standard drinks     Types: 2 Glasses of wine per week     Comment: social     Drug use: No    Sexual activity: Yes     Partners: Male     Birth control/protection: See Surgical Hx     Comment: tubal ligation in 1994     Review of Systems   Constitutional:  Positive for appetite change. Negative for activity change, chills, fever and unexpected weight change.   HENT:  Negative for congestion and sore throat.    Respiratory:  Negative for cough and shortness of breath.    Cardiovascular:  Negative for chest pain, palpitations and leg swelling.   Gastrointestinal:  Positive for abdominal pain. Negative for abdominal distention, blood in stool, constipation, diarrhea, nausea and vomiting.   Genitourinary:  Negative for difficulty urinating, dysuria and hematuria.   Musculoskeletal:   Positive for back pain. Negative for arthralgias and myalgias.   Skin:  Negative for color change and rash.   Neurological:  Negative for dizziness, tremors and seizures.   Objective:     Vital Signs (Most Recent):  Temp: 98.3 °F (36.8 °C) (10/05/22 1827)  Pulse: 64 (10/05/22 1827)  Resp: 18 (10/05/22 1943)  BP: (!) 171/76 (10/05/22 1827)  SpO2: 99 % (10/05/22 1827)   Vital Signs (24h Range):  Temp:  [97.8 °F (36.6 °C)-99.5 °F (37.5 °C)] 98.3 °F (36.8 °C)  Pulse:  [60-64] 64  Resp:  [16-18] 18  SpO2:  [98 %-100 %] 99 %  BP: (136-171)/(42-84) 171/76     Weight: 66.7 kg (147 lb)  Body mass index is 24.46 kg/m².    Physical Exam  Vitals reviewed.   Constitutional:       General: She is not in acute distress.     Appearance: She is well-developed.   HENT:      Head: Normocephalic and atraumatic.   Eyes:      Extraocular Movements: Extraocular movements intact.      Pupils: Pupils are equal, round, and reactive to light.   Neck:      Vascular: No JVD.      Trachea: No tracheal deviation.   Cardiovascular:      Rate and Rhythm: Normal rate and regular rhythm.      Heart sounds: No murmur heard.    No friction rub. No gallop.   Pulmonary:      Effort: No respiratory distress.      Breath sounds: Normal breath sounds. No wheezing or rales.   Abdominal:      General: Bowel sounds are normal. There is no distension.      Palpations: Abdomen is soft. There is no mass.      Tenderness: There is no abdominal tenderness.      Comments: Moderate TTP in epigastric region and RUQ   Musculoskeletal:         General: No deformity.      Cervical back: Neck supple.   Lymphadenopathy:      Cervical: No cervical adenopathy.   Skin:     General: Skin is warm and dry.      Findings: No rash.   Neurological:      Mental Status: She is alert and oriented to person, place, and time.       CRANIAL NERVES     CN III, IV, VI   Pupils are equal, round, and reactive to light.     Significant Labs: All pertinent labs within the past 24 hours have  been reviewed.    Significant Imaging: I have reviewed all pertinent imaging results/findings within the past 24 hours.

## 2022-10-06 NOTE — PROGRESS NOTES
[facility] [address] [place] [address] Medicine  Progress Note    Patient Name: Joan Rizvi  MRN: [id number]  Patient Class: OP- Observation   Admission Date: 10/5/2022  Length of Stay: 0 days  Attending Physician: Kelly Estevez MD  Primary Care Provider: Zeynep Salgado MD        Subjective:     Principal Problem:Acute on chronic pancreatitis        HPI:  51 yo F with PMHx recurrent pancreatitis and HTN who presents to the ED complaining of abdominal pain x 2 days. Pt. Reports epigastric sharp abdominal pain with radiation to her back which started yesterday morning. Pt. Reports pain is worse when eating, and she has not been able to tolerate much PO intake due to pain and nausea. She denies any vomiting, fevers, chills, lightheadedness, or palpitations. Pt. Has had recurrent issues with pancreatitis and has been worked up by GI in the past without clear etiology. Biopsy of lesion on pancreatic tail from 10/2019 showed fat necrosis, no evidence of malignancy. Pt. Reports occasional alcohol use, last drink on [birth year]. She states she only had a couple of drinks that day and prior to that she has not had a drink in a long time.      Overview/Hospital Course:  Kaelyn Rizvi is a 52 y.o. female admitted to observation for acute on chronic pancreatitis. Tolerated CLD and abdominal pain managed. Transitioned to oral meds/ full liquid diet.       Interval History: NAEON. AF, VSS. 150 ml/hr continuous fluids. Pain managed on IV meds. Denied any nausea or vomiting with clear liquid diet. Will transition to full liquids with PO pain meds.     Review of Systems   Constitutional:  Positive for appetite change. Negative for activity change, chills, fever and unexpected weight change.   HENT:  Negative for congestion and sore throat.    Respiratory:  Negative for cough, shortness of breath and wheezing.    Cardiovascular:  Negative for chest pain, palpitations and leg swelling.   Gastrointestinal:  Positive  for abdominal pain. Negative for abdominal distention, blood in stool, constipation, diarrhea, nausea and vomiting.   Genitourinary:  Negative for difficulty urinating, dysuria, frequency, hematuria and urgency.   Musculoskeletal:  Positive for back pain (improving). Negative for arthralgias and myalgias.   Skin:  Negative for color change and rash.   Neurological:  Negative for dizziness, tremors and seizures.   Objective:     Vital Signs (Most Recent):  Temp: 97 °F (36.1 °C) (10/06/22 1121)  Pulse: 77 (10/06/22 1121)  Resp: 18 (10/06/22 1336)  BP: (!) 142/75 (10/06/22 1121)  SpO2: 98 % (10/06/22 1121)   Vital Signs (24h Range):  Temp:  [97 °F (36.1 °C)-99 °F (37.2 °C)] 97 °F (36.1 °C)  Pulse:  [60-77] 77  Resp:  [16-18] 18  SpO2:  [92 %-100 %] 98 %  BP: (141-171)/(42-84) 142/75     Weight: 66.7 kg (147 lb)  Body mass index is 24.46 kg/m².  No intake or output data in the 24 hours ending 10/06/22 1344   Physical Exam  Vitals reviewed.   Constitutional:       General: She is not in acute distress.     Appearance: She is well-developed.   HENT:      Head: Normocephalic and atraumatic.   Eyes:      Extraocular Movements: Extraocular movements intact.      Pupils: Pupils are equal, round, and reactive to light.   Neck:      Vascular: No JVD.      Trachea: No tracheal deviation.   Cardiovascular:      Rate and Rhythm: Normal rate and regular rhythm.      Heart sounds: No murmur heard.    No friction rub. No gallop.   Pulmonary:      Effort: No respiratory distress.      Breath sounds: Normal breath sounds. No wheezing or rales.   Abdominal:      General: Bowel sounds are normal. There is no distension.      Palpations: Abdomen is soft. There is no mass.      Tenderness: There is no abdominal tenderness.      Comments: TTP in epigastrium    Musculoskeletal:         General: No deformity.      Cervical back: Neck supple.   Lymphadenopathy:      Cervical: No cervical adenopathy.   Skin:     General: Skin is warm and dry.       Findings: No rash.   Neurological:      Mental Status: She is alert and oriented to person, place, and time.       Significant Labs: All pertinent labs within the past 24 hours have been reviewed.  BMP:   Recent Labs   Lab 10/06/22  0312   *      K 3.6      CO2 26   BUN 6   CREATININE 0.7   CALCIUM 9.6     CBC:   Recent Labs   Lab 10/05/22  1340 10/06/22  0312   WBC 5.23 5.88   HGB 13.4 12.6   HCT 39.2 36.5*    296     Lipase:   Recent Labs   Lab 10/05/22  1412   LIPASE 103*       Significant Imaging: I have reviewed all pertinent imaging results/findings within the past 24 hours.    Imaging Results              CT Abdomen Pelvis With Contrast (Final result)  Result time 10/05/22 19:02:39      Final result by Huber Lion MD (10/05/22 19:02:39)                   Impression:      1. Findings concerning for acute, uncomplicated regional pancreatitis involving the head and neck.  No discrete pancreatic mass; however, given the regional location underlying infiltrative type pancreatic neoplastic process not excluded on the basis of this examination.  Consider follow-up with imaging after acute illness has resolved to exclude underlying neoplasm.  2. Remote cholecystectomy.  3. Suspected hepatic steatosis with multifocal scattered ill-defined areas of parenchymal enhancement suggesting sequela of transient hepatic attenuation differences, noting no discrete hepatic mass seen.  Clinical correlation and with LFTs advised.  4. Minimal colonic diverticulosis without acute diverticulitis.  5. Few additional findings as above.      Electronically signed by: Huber Lion MD  Date:    10/05/2022  Time:    19:02               Narrative:    EXAMINATION:  CT ABDOMEN PELVIS WITH CONTRAST    CLINICAL HISTORY:  Abdominal abscess/infection suspected;Pancreatitis, acute, severe;    TECHNIQUE:  Low dose axial images, sagittal and coronal reformations were obtained from the lung bases to the pubic  symphysis following the IV administration of 75 mL of Omnipaque 350 .  Oral contrast was not given.    COMPARISON:  MRI abdomen 09/24/2021, abdominal ultrasound 07/20/2020 and CT abdomen 08/23/2019    FINDINGS:  Imaged lung bases are clear.  Base of the heart is within normal limits.    Remote cholecystectomy.  Grossly similar mild prominence of the central intrahepatic and extrahepatic biliary ducts.    Liver is normal in size with diffuse parenchymal hypoattenuation and multifocal scattered areas of ill-defined parenchymal enhancement throughout each lobe suggesting sequela of transient hepatic attenuation difference.  Main portal vein appears patent.  Spleen is normal in size noting a few scattered small parenchymal calcified granulomas.  Stomach is mild-to-moderately distended with intraluminal contents without wall thickening or adjacent inflammation.  Duodenum is normal in caliber.  Bilateral adrenal glands are within normal limits.  Suspected tiny accessory splenule along the inferior splenic hilum.    Pancreas is normal in size noting slight heterogeneous hypoattenuating appearance of the pancreatic head and neck with mild stranding about the pancreatic head and neck concerning for sequela of regional acute pancreatitis.  No discrete enhancing pancreatic mass, parenchymal calcification, pseudocyst, significant ductal dilatation or peripancreatic fluid collection seen at this time.  Celiac axis and SMA are patent.  C[place] is patent.  No significant aortic atherosclerosis.  No aortic aneurysm or dissection.    Bilateral kidneys are normal in size, shape and location with symmetric normal enhancement.  No hydronephrosis or significant perinephric stranding.  Few scattered tiny hypoattenuating cortical foci at each kidney which are too small to characterize.  Ureters are nondilated.  Urinary bladder is suboptimally distended.  Uterus and bilateral adnexa are grossly within normal limits.  Pelvic phleboliths noted.   No significant amount of free fluid seen in the pelvis.    No ascites, free air or lymphadenopathy by CT criteria.    Appendix and terminal ileum are within normal limits.  A few scattered colonic diverticula without acute diverticulitis.  No convincing evidence of bowel obstruction or acute inflammation.  No pneumatosis or portal venous gas.    Osseous structures appear stable without acute process seen.                                          Assessment/Plan:      * Acute on chronic pancreatitis  Pt. with epigastric pain radiating to back and nausea. Pt endorses previous episodes  -CT abdomen shows findings concerning for acute, uncomplicated regional pancreatitis involving the head and neck as well as hepatic steatosis  -Etiology unclear, pt. Reports alcohol use which is likely contributing. Counseled on cessation.  - IV fluids  - CLD>> transitioned to full liquid  - PO PRN pain medications  - bentyl 4x daily  -Continue to monitor for symptomatic improvement      Type 2 diabetes mellitus, without long-term current use of insulin  A1C 5.7  -Hold PO meds until able to tolerate a diet, SSI ordered    Essential hypertension  -Continue home amlodipine      VTE Risk Mitigation (From admission, onward)         Ordered     enoxaparin injection 40 mg  Daily         10/05/22 1941     IP VTE HIGH RISK PATIENT  Once         10/05/22 1941                Discharge Planning   YANIQUE: 10/7/2022     Code Status: Full Code   Is the patient medically ready for discharge?: No    Reason for patient still in hospital (select all that apply): Patient trending condition and Treatment  Discharge Plan A: Home                  Katia Trotter PA-C  Department of Hospital Medicine   [facility] 11[facility]

## 2022-10-06 NOTE — NURSING
Patient arrived to unit. No distress noted.vital signs stable. Complaints of pain. Pain meds given. Will continue to monitor patient at this time.

## 2022-10-06 NOTE — HOSPITAL COURSE
Kaelyn Rizvi is a 52 y.o. female admitted to observation for acute on chronic pancreatitis. Improving on continuous Tolerated CLD and abdominal pain managed. Transitioned to oral meds/ full liquid diet. Tolerated well. Transitioned to bland diet. Abdominal pain is controlled. Patient has improved and is stable for discharge. All questions answered at bedside with verbal understanding. Counseled patient on a bland diet and will transition to a regular diet when able to tolerate. Follow-up with GI and PCP.

## 2022-10-07 VITALS
TEMPERATURE: 96 F | OXYGEN SATURATION: 99 % | SYSTOLIC BLOOD PRESSURE: 140 MMHG | HEART RATE: 68 BPM | HEIGHT: 65 IN | DIASTOLIC BLOOD PRESSURE: 79 MMHG | BODY MASS INDEX: 24.49 KG/M2 | WEIGHT: 147 LBS | RESPIRATION RATE: 18 BRPM

## 2022-10-07 LAB — POCT GLUCOSE: 131 MG/DL (ref 70–110)

## 2022-10-07 PROCEDURE — 25000003 PHARM REV CODE 250: Performed by: HOSPITALIST

## 2022-10-07 PROCEDURE — G0378 HOSPITAL OBSERVATION PER HR: HCPCS

## 2022-10-07 PROCEDURE — 99217 PR OBSERVATION CARE DISCHARGE: CPT | Mod: ,,,

## 2022-10-07 PROCEDURE — 96361 HYDRATE IV INFUSION ADD-ON: CPT

## 2022-10-07 PROCEDURE — 25000003 PHARM REV CODE 250

## 2022-10-07 PROCEDURE — 99217 PR OBSERVATION CARE DISCHARGE: ICD-10-PCS | Mod: ,,,

## 2022-10-07 RX ORDER — OXYCODONE HYDROCHLORIDE 5 MG/1
5 TABLET ORAL
Qty: 5 TABLET | Refills: 0 | Status: SHIPPED | OUTPATIENT
Start: 2022-10-07 | End: 2022-10-12

## 2022-10-07 RX ORDER — ONDANSETRON 4 MG/1
4 TABLET, FILM COATED ORAL EVERY 8 HOURS PRN
Qty: 20 TABLET | Refills: 0 | Status: SHIPPED | OUTPATIENT
Start: 2022-10-07 | End: 2022-10-14

## 2022-10-07 RX ADMIN — DICYCLOMINE HYDROCHLORIDE 10 MG: 10 CAPSULE ORAL at 12:10

## 2022-10-07 RX ADMIN — VENLAFAXINE HYDROCHLORIDE 75 MG: 75 CAPSULE, EXTENDED RELEASE ORAL at 08:10

## 2022-10-07 RX ADMIN — OXYCODONE 5 MG: 5 TABLET ORAL at 05:10

## 2022-10-07 RX ADMIN — ONDANSETRON HYDROCHLORIDE 4 MG: 4 TABLET, FILM COATED ORAL at 08:10

## 2022-10-07 RX ADMIN — OXYCODONE 5 MG: 5 TABLET ORAL at 12:10

## 2022-10-07 RX ADMIN — DICYCLOMINE HYDROCHLORIDE 10 MG: 10 CAPSULE ORAL at 08:10

## 2022-10-07 RX ADMIN — PANCRELIPASE 1 CAPSULE: 24000; 76000; 120000 CAPSULE, DELAYED RELEASE PELLETS ORAL at 12:10

## 2022-10-07 RX ADMIN — AMLODIPINE BESYLATE 10 MG: 10 TABLET ORAL at 08:10

## 2022-10-07 RX ADMIN — PANCRELIPASE 1 CAPSULE: 24000; 76000; 120000 CAPSULE, DELAYED RELEASE PELLETS ORAL at 08:10

## 2022-10-07 NOTE — PLAN OF CARE
[facility] 11[facility]  Discharge Final Note    Primary Care Provider: Zeynep Salgado MD    Expected Discharge Date: 10/7/2022    Future Appointments   Date Time Provider Department Center   12/9/2022  9:30 AM Rachel Moreno NP [facility]   12/19/2022 10:30 AM Edgar Rodgers MD [facility] [place]   1/5/2023 12:45 PM [facility] [place]       Pt discharged home with no services.  Po Matthews, RN,BSN        Final Discharge Note (most recent)       Final Note - 10/07/22 1540          Final Note    Assessment Type Final Discharge Note     Anticipated Discharge Disposition Home or Self Care     Hospital Resources/Appts/Education Provided Provided patient/caregiver with written discharge plan information;Appointments scheduled and added to AVS        Post-Acute Status    Discharge Delays None known at this time                     Important Message from Medicare

## 2022-10-07 NOTE — ASSESSMENT & PLAN NOTE
Pt. with epigastric pain radiating to back and nausea. Pt endorses previous episodes. Improving   -CT abdomen shows findings concerning for acute, uncomplicated regional pancreatitis involving the head and neck as well as hepatic steatosis  -Etiology unclear, pt. Reports alcohol use which is likely contributing. Counseled on cessation.  - IV fluids  - CLD>> transitioned to full liquid, transitioned to bland diet (tolerated well)  - PO PRN pain medications  - bentyl 4x daily  - PO zofran PRN

## 2022-10-07 NOTE — DISCHARGE SUMMARY
[facility]ation [address] [place] [address] Medicine  Discharge Summary      Patient Name: Joan Rizvi  MRN: [id number]  Patient Class: OP- Observation  Admission Date: 10/5/2022  Hospital Length of Stay: 0 days  Discharge Date and Time:  10/07/2022 11:11 AM  Attending Physician: Kelly Estevez MD   Discharging Provider: Katia Trotter PA-C  Primary Care Provider: Zeynep Salgado MD  [facility] Medicine Team: [facility] Katia Trotter PA-C    HPI:   53 yo F with PMHx recurrent pancreatitis and HTN who presents to the ED complaining of abdominal pain x 2 days. Pt. Reports epigastric sharp abdominal pain with radiation to her back which started yesterday morning. Pt. Reports pain is worse when eating, and she has not been able to tolerate much PO intake due to pain and nausea. She denies any vomiting, fevers, chills, lightheadedness, or palpitations. Pt. Has had recurrent issues with pancreatitis and has been worked up by GI in the past without clear etiology. Biopsy of lesion on pancreatic tail from 10/2019 showed fat necrosis, no evidence of malignancy. Pt. Reports occasional alcohol use, last drink on her [birth year]. She states she only had a couple of drinks that day and prior to that she has not had a drink in a long time.      * No surgery found *      Hospital Course:   Kaelyn Rizvi is a 52 y.o. female admitted to observation for acute on chronic pancreatitis. Improving on continuous Tolerated CLD and abdominal pain managed. Transitioned to oral meds/ full liquid diet. Tolerated well. Transitioned to bland diet. Abdominal pain is controlled. Patient has improved and is stable for discharge. All questions answered at bedside with verbal understanding. Counseled patient on a bland diet and will transition to a regular diet when able to tolerate. Follow-up with GI and PCP.       Goals of Care Treatment Preferences:  Code Status: Full Code      Consults:     * Acute on chronic pancreatitis  Pt. with  epigastric pain radiating to back and nausea. Pt endorses previous episodes. Improving   -CT abdomen shows findings concerning for acute, uncomplicated regional pancreatitis involving the head and neck as well as hepatic steatosis  -Etiology unclear, pt. Reports alcohol use which is likely contributing. Counseled on cessation.  - IV fluids  - CLD>> transitioned to full liquid, transitioned to bland diet (tolerated well)  - PO PRN pain medications  - bentyl 4x daily  - PO zofran PRN    Hyperlipidemia associated with type 2 diabetes mellitus  Patient's FSGs are controlled on current medication regimen.  Last A1c reviewed-   Lab Results   Component Value Date    HGBA1C 5.7 (H) 10/05/2022     Most recent fingerstick glucose reviewed-   Recent Labs   Lab 10/06/22  1533 10/07/22  0445   POCTGLUCOSE 139* 131*     Current correctional scale  Low  Maintain anti-hyperglycemic dose as follows-   Antihyperglycemics (From admission, onward)    Start     Stop Route Frequency Ordered    10/05/22 2125  insulin aspart U-100 pen 0-5 Units         -- SubQ Every 6 hours PRN 10/05/22 2025        Hold Oral hypoglycemics while patient is in the hospital.  - continue home statin    Type 2 diabetes mellitus, without long-term current use of insulin  A1C 5.7  -Hold PO meds until able to tolerate a diet, SSI ordered    Gastroesophageal reflux disease without esophagitis  - continue protonix and sucralfate    Essential hypertension  -Continue home amlodipine      Final Active Diagnoses:    Diagnosis Date Noted POA    PRINCIPAL PROBLEM:  Acute on chronic pancreatitis [K85.90, K86.1] 10/05/2022 Yes    Type 2 diabetes mellitus, without long-term current use of insulin [E11.9]  Yes    Hyperlipidemia associated with type 2 diabetes mellitus [E11.69, E78.5]  Yes    Essential hypertension [I10] 09/03/2019 Yes    Gastroesophageal reflux disease without esophagitis [K21.9] 09/03/2019 Yes      Problems Resolved During this Admission:        Discharged Condition: good    Disposition: Home or Self Care    Follow Up:    Patient Instructions:      Ambulatory referral/consult to Gastroenterology   Standing Status: Future   Referral Priority: Routine Referral Type: Consultation   Referral Reason: Specialty Services Required   Requested Specialty: Gastroenterology   Number of Visits Requested: 1     Diet Adult Regular   Order Comments: [place] diet then transition to a regular diet when able to tolerate     Notify your health care provider if you experience any of the following:  temperature >100.4     Notify your health care provider if you experience any of the following:  persistent nausea and vomiting or diarrhea     Notify your health care provider if you experience any of the following:  severe uncontrolled pain     Activity as tolerated       Significant Diagnostic Studies: Labs:   CMP   Recent Labs   Lab 10/05/22  1340 10/06/22  0312   * 137   K 3.7 3.6   CL 97 100   CO2 26 26   * 117*   BUN 12 6   CREATININE 0.8 0.7   CALCIUM 9.7 9.6   PROT 7.8 7.1   ALBUMIN 4.2 3.8   BILITOT 0.6 0.5   ALKPHOS 145* 127   AST 28 22   ALT 32 27   ANIONGAP 11 11    and CBC   Recent Labs   Lab 10/05/22  1340 10/06/22  0312   WBC 5.23 5.88   HGB 13.4 12.6   HCT 39.2 36.5*    296       Pending Diagnostic Studies:     None         Medications:  Reconciled Home Medications:      Medication List      START taking these medications    ondansetron 4 MG tablet  Commonly known as: ZOFRAN  Take 1 tablet (4 mg total) by mouth every 8 (eight) hours as needed for Nausea.     oxyCODONE 5 MG immediate release tablet  Commonly known as: ROXICODONE  Take 1 tablet (5 mg total) by mouth every 24 hours as needed for Pain.        CONTINUE taking these medications    ACCU-CHEK GUIDE TEST STRIPS Strp  Generic drug: blood sugar diagnostic  Monitor as directed twice daily. Per insurance coverage.     alcohol swabs Padm  Commonly known as: ALCOHOL WIPES  Monitor as  directed. Per insurance coverage.     amLODIPine 10 MG tablet  Commonly known as: NORVASC  Take 1 tablet (10 mg total) by mouth once daily.     atorvastatin 20 MG tablet  Commonly known as: LIPITOR  Take 1 tablet (20 mg total) by mouth once daily.     blood-glucose meter kit  Monitor as directed. Per insurance coverage.     cetirizine 10 MG tablet  Commonly known as: ZYRTEC  TAKE 1 TABLET BY MOUTH EVERY DAY AS NEEDED FOR ALLERGY     chlorhexidine 0.12 % solution  Commonly known as: PERIDEX  SMARTSIG:15 By Mouth 3 Times Daily     CREON 24,000-76,000 -120,000 unit capsule  Generic drug: lipase-protease-amylase 24,000-76,000-120,000 units  Take 2 capsules by mouth 4 (four) times daily with meals and nightly.     dicyclomine 10 MG capsule  Commonly known as: BENTYL  Take 10 mg by mouth.     fluticasone propionate 50 mcg/actuation nasal spray  Commonly known as: FLONASE  Instill 2 sprays (100 mcg total) in each nostril 2 (two) times daily as needed for Rhinitis or Allergies.     lancets Misc  Per insurance coverage.     meloxicam 15 MG tablet  Commonly known as: MOBIC  Take 1 tablet (15 mg total) by mouth daily as needed (Pain and inflammation.).     metFORMIN 500 MG ER 24hr tablet  Commonly known as: GLUCOPHAGE-XR  Take 1 tablet (500 mg total) by mouth 2 (two) times a day.     pantoprazole 40 MG tablet  Commonly known as: PROTONIX  Take 1 tablet (40 mg total) by mouth once daily.     senna-docusate 8.6-50 mg 8.6-50 mg per tablet  Commonly known as: PERICOLACE  Take 1 tablet by mouth 2 (two) times daily as needed.     sucralfate 1 gram tablet  Commonly known as: CARAFATE  Take 1 tablet (1 g total) by mouth 3 (three) times daily as needed (dyspepsia).     venlafaxine 75 MG 24 hr capsule  Commonly known as: EFFEXOR-XR  Take 1 capsule (75 mg total) by mouth once daily.     VITAMIN D2 50,000 unit Cap  Generic drug: ergocalciferol  Take 1 capsule (50,000 Units total) by mouth every 7 days.     zolpidem 5 MG Tab  Commonly  known as: AMBIEN  Take 1 tablet (5 mg total) by mouth nightly as needed (Insomnia.).            Indwelling Lines/Drains at time of discharge:   Lines/Drains/Airways     None                 Time spent on the discharge of patient: 36 minutes         Katia Trotter PA-C  Department of Hospital Medicine  [facility]

## 2022-10-07 NOTE — PLAN OF CARE
Problem: Adult Inpatient Plan of Care  Goal: Optimal Comfort and Wellbeing  Outcome: Ongoing, Progressing     Problem: Adult Inpatient Plan of Care  Goal: Patient-Specific Goal (Individualized)  Outcome: Ongoing, Progressing     Problem: Adult Inpatient Plan of Care  Goal: Plan of Care Review  Outcome: Ongoing, Progressing

## 2022-10-07 NOTE — NURSING
No s/s of distress.VS stable . resp even and unlabored. Safety precautions maintained. Bed in low position call light within reach patient instructed to call if needed will cont to monitor

## 2022-10-07 NOTE — ASSESSMENT & PLAN NOTE
Patient's FSGs are controlled on current medication regimen.  Last A1c reviewed-   Lab Results   Component Value Date    HGBA1C 5.7 (H) 10/05/2022     Most recent fingerstick glucose reviewed-   Recent Labs   Lab 10/06/22  1533 10/07/22  0445   POCTGLUCOSE 139* 131*     Current correctional scale  Low  Maintain anti-hyperglycemic dose as follows-   Antihyperglycemics (From admission, onward)    Start     Stop Route Frequency Ordered    10/05/22 2125  insulin aspart U-100 pen 0-5 Units         -- SubQ Every 6 hours PRN 10/05/22 2025        Hold Oral hypoglycemics while patient is in the hospital.  - continue home statin

## 2022-10-07 NOTE — NURSING
Discharge instructions reviewed and copy given. Pt verbalized understanding. Pt discharged per w/c with all personal belongings.

## 2022-10-10 LAB
LEFT EYE DM RETINOPATHY: NEGATIVE
RIGHT EYE DM RETINOPATHY: NEGATIVE

## 2022-11-16 DIAGNOSIS — E11.9 TYPE 2 DIABETES MELLITUS WITHOUT COMPLICATION, WITHOUT LONG-TERM CURRENT USE OF INSULIN: ICD-10-CM

## 2022-11-16 RX ORDER — DICYCLOMINE HYDROCHLORIDE 10 MG/1
10 CAPSULE ORAL 3 TIMES DAILY PRN
Qty: 90 CAPSULE | Refills: 0 | Status: SHIPPED | OUTPATIENT
Start: 2022-11-16 | End: 2023-01-05 | Stop reason: SDUPTHER

## 2022-11-16 RX ORDER — METFORMIN HYDROCHLORIDE 500 MG/1
500 TABLET, EXTENDED RELEASE ORAL 2 TIMES DAILY
Qty: 180 TABLET | Refills: 0 | Status: SHIPPED | OUTPATIENT
Start: 2022-11-16 | End: 2023-01-05 | Stop reason: SDUPTHER

## 2022-11-16 NOTE — TELEPHONE ENCOUNTER
----- Message from Siri Partida sent at 11/16/2022  9:53 AM CST -----  Contact: [phone]  Requesting an RX refill or new RX.  Is this a refill or new RX: Refill 1  RX name and strength (copy/paste from chart):  metFORMIN (GLUCOPHAGE-XR) 500 MG ER 24hr tablet  Is this a 30 day or 90 day RX:   Pharmacy name and phone # (copy/paste from chart):  [facility]/pharmacy #[id number] - [place] - [address] Phone:  [phone]  Fax:  [phone]      Requesting an RX refill or new RX.  Is this a refill or new RX: Refill 2  RX name and strength (copy/paste from chart):  dicyclomine (BENTYL) 10 MG capsule  Is this a 30 day or 90 day RX:   Pharmacy name and phone # (copy/paste from chart):  [facility]/pharmacy #[id number] [facility] [place] - [address] Phone:  [phone]  Fax:  [phone]      The doctors have asked that we provide their patients with the following 2 reminders -- prescription refills can take up to 72 hours, and a friendly reminder that in the future you can use your [facility] account to request refills:

## 2022-11-23 ENCOUNTER — TELEPHONE (OUTPATIENT)
Dept: ENDOSCOPY | Facility: HOSPITAL | Age: 52
End: 2022-11-23
Payer: COMMERCIAL

## 2022-11-23 ENCOUNTER — OFFICE VISIT (OUTPATIENT)
Dept: GASTROENTEROLOGY | Facility: CLINIC | Age: 52
End: 2022-11-23
Payer: COMMERCIAL

## 2022-11-23 VITALS
HEIGHT: 63 IN | WEIGHT: 156.88 LBS | DIASTOLIC BLOOD PRESSURE: 82 MMHG | BODY MASS INDEX: 27.8 KG/M2 | OXYGEN SATURATION: 98 % | HEART RATE: 68 BPM | SYSTOLIC BLOOD PRESSURE: 135 MMHG

## 2022-11-23 PROCEDURE — 99999 PR PBB SHADOW E&M-EST. PATIENT-LVL IV: ICD-10-PCS | Mod: PBBFAC,,,

## 2022-11-23 PROCEDURE — 3008F BODY MASS INDEX DOCD: CPT | Mod: CPTII,S$GLB,,

## 2022-11-23 PROCEDURE — 3066F NEPHROPATHY DOC TX: CPT | Mod: CPTII,S$GLB,,

## 2022-11-23 PROCEDURE — 3075F PR MOST RECENT SYSTOLIC BLOOD PRESS GE 130-139MM HG: ICD-10-PCS | Mod: CPTII,S$GLB,,

## 2022-11-23 PROCEDURE — 99999 PR PBB SHADOW E&M-EST. PATIENT-LVL IV: CPT | Mod: PBBFAC,,,

## 2022-11-23 PROCEDURE — 3008F PR BODY MASS INDEX (BMI) DOCUMENTED: ICD-10-PCS | Mod: CPTII,S$GLB,,

## 2022-11-23 PROCEDURE — 99213 OFFICE O/P EST LOW 20 MIN: CPT | Mod: S$GLB,,,

## 2022-11-23 PROCEDURE — 3061F PR NEG MICROALBUMINURIA RESULT DOCUMENTED/REVIEW: ICD-10-PCS | Mod: CPTII,S$GLB,,

## 2022-11-23 PROCEDURE — 3079F PR MOST RECENT DIASTOLIC BLOOD PRESSURE 80-89 MM HG: ICD-10-PCS | Mod: CPTII,S$GLB,,

## 2022-11-23 PROCEDURE — 3066F PR DOCUMENTATION OF TREATMENT FOR NEPHROPATHY: ICD-10-PCS | Mod: CPTII,S$GLB,,

## 2022-11-23 PROCEDURE — 3079F DIAST BP 80-89 MM HG: CPT | Mod: CPTII,S$GLB,,

## 2022-11-23 PROCEDURE — 3061F NEG MICROALBUMINURIA REV: CPT | Mod: CPTII,S$GLB,,

## 2022-11-23 PROCEDURE — 3075F SYST BP GE 130 - 139MM HG: CPT | Mod: CPTII,S$GLB,,

## 2022-11-23 PROCEDURE — 1159F PR MEDICATION LIST DOCUMENTED IN MEDICAL RECORD: ICD-10-PCS | Mod: CPTII,S$GLB,,

## 2022-11-23 PROCEDURE — 3072F PR LOW RISK FOR RETINOPATHY: ICD-10-PCS | Mod: CPTII,S$GLB,,

## 2022-11-23 PROCEDURE — 1159F MED LIST DOCD IN RCRD: CPT | Mod: CPTII,S$GLB,,

## 2022-11-23 PROCEDURE — 3044F PR MOST RECENT HEMOGLOBIN A1C LEVEL <7.0%: ICD-10-PCS | Mod: CPTII,S$GLB,,

## 2022-11-23 PROCEDURE — 3072F LOW RISK FOR RETINOPATHY: CPT | Mod: CPTII,S$GLB,,

## 2022-11-23 PROCEDURE — 3044F HG A1C LEVEL LT 7.0%: CPT | Mod: CPTII,S$GLB,,

## 2022-11-23 PROCEDURE — 99213 PR OFFICE/OUTPT VISIT, EST, LEVL III, 20-29 MIN: ICD-10-PCS | Mod: S$GLB,,,

## 2022-11-23 NOTE — PROGRESS NOTES
[facility]    Reason for Visit:  The encounter diagnosis was Recurrent pancreatitis.    PCP:   Zeynep Salgado         Initial HPI   This is a 52 y.o. female presenting for acute on chronic pancreatitis. Has had multiple episodes in last 6 years. Recent ED encounter at [facility] for pancreatitis exhibited as n/v and abdominal pain. CT from that encounter is not available. However, CT A/P 10/5/22 exhibited  slight heterogeneous hypoattenuating appearance of the pancreatic head and neck with mild stranding about the pancreatic head and neck concerning for sequela of regional acute pancreatitis.  No discrete enhancing pancreatic mass, parenchymal calcification, pseudocyst, significant ductal dilatation or peripancreatic fluid collection. Patient reports that most recent pancreatitis episode resolved approximately 4 days ago. Denies abdominal pain, n/v, hematochezia at this time.     Remote hx of cholecystectomy ~6 years ago. Triglycerides WNL. Ca+ WNL. No Fhx of pancreatitis or pancreatic CA.     Tobacco use- former smoker  ETOH intake- 2 drinks per week   NSAID use- occasional ibuprofen  Blood thinners-  n/a    ROS:  Review of Systems   Constitutional:  Negative for chills, diaphoresis and fever.   Eyes:  Negative for blurred vision, double vision and redness.   Gastrointestinal:  Negative for abdominal pain, blood in stool, constipation, diarrhea, nausea and vomiting.   Skin:  Negative for rash.   Neurological:  Negative for speech change and loss of consciousness.      Medical History:  has a past medical history of GERD (gastroesophageal reflux disease), Hyperlipidemia associated with type 2 diabetes mellitus, Hypertension, Mental disorder, Pancreatitis, Type 2 diabetes mellitus, and Vitamin D deficiency.    Surgical History:  has a past surgical history that includes  section; Endometrial ablation; upper gi; Endoscopic ultrasound of upper gastrointestinal tract (N/A,  6/24/2019); Esophagogastroduodenoscopy (N/A, 6/24/2019); Laparoscopic cholecystectomy (N/A, 7/24/2019); Tubal ligation (04/1994); Endoscopic ultrasound of upper gastrointestinal tract (N/A, 11/6/2019); Colonoscopy (N/A, 3/18/2021); and Endoscopic ultrasound of upper gastrointestinal tract (N/A, 10/15/2021).    Family History: family history includes Alcohol abuse in her father and mother; Aneurysm in her mother; Coronary artery disease in her paternal grandmother; Dementia in her paternal grandfather and paternal grandmother; Diabetes in her maternal aunt and maternal uncle; Heart disease in her maternal uncle; Heart failure in her mother; Hypertension in her father; Liver disease in her father; No Known Problems in her brother, maternal grandfather, maternal grandmother, paternal aunt, paternal uncle, sister, and sister; Stroke in her mother..       Review of patient's allergies indicates:   Allergen Reactions    Pineapple Swelling    Hydrochlorothiazide Other (See Comments)     CAUSING PANCREATITIS        Current Outpatient Medications on File Prior to Visit   Medication Sig Dispense Refill    alcohol swabs (ALCOHOL WIPES) PadM Monitor as directed. Per insurance coverage. 200 each 3    amLODIPine (NORVASC) 10 MG tablet Take 1 tablet (10 mg total) by mouth once daily. 90 tablet 0    atorvastatin (LIPITOR) 20 MG tablet Take 1 tablet (20 mg total) by mouth once daily. 90 tablet 3    blood sugar diagnostic Strp Monitor as directed twice daily. Per insurance coverage. (Patient taking differently: Monitor as directed twice daily. Per insurance coverage.) 200 each 3    blood-glucose meter kit Monitor as directed. Per insurance coverage. 1 each 0    cetirizine (ZYRTEC) 10 MG tablet TAKE 1 TABLET BY MOUTH EVERY DAY AS NEEDED FOR ALLERGY 90 tablet 3    chlorhexidine (PERIDEX) 0.12 % solution SMARTSIG:15 By Mouth 3 Times Daily      dicyclomine (BENTYL) 10 MG capsule Take 1 capsule (10 mg total) by mouth 3 (three) times daily  "as needed (Abdominal pain.). 90 capsule 0    ergocalciferol (ERGOCALCIFEROL) 50,000 unit Cap Take 1 capsule (50,000 Units total) by mouth every 7 days. 12 capsule 3    fluticasone propionate (FLONASE) 50 mcg/actuation nasal spray Instill 2 sprays (100 mcg total) in each nostril 2 (two) times daily as needed for Rhinitis or Allergies. 16 g 11    lancets Misc Per insurance coverage. 200 each 3    lipase-protease-amylase 24,000-76,000-120,000 units (CREON) 24,000-76,000 -120,000 unit capsule Take 2 capsules by mouth 4 (four) times daily with meals and nightly. 240 capsule 1    meloxicam (MOBIC) 15 MG tablet Take 1 tablet (15 mg total) by mouth daily as needed (Pain and inflammation.). 30 tablet 0    metFORMIN (GLUCOPHAGE-XR) 500 MG ER 24hr tablet Take 1 tablet (500 mg total) by mouth 2 (two) times a day. 180 tablet 0    pantoprazole (PROTONIX) 40 MG tablet Take 1 tablet (40 mg total) by mouth once daily. 90 tablet 0    senna-docusate 8.6-50 mg (PERICOLACE) 8.6-50 mg per tablet Take 1 tablet by mouth 2 (two) times daily as needed.      sucralfate (CARAFATE) 1 gram tablet Take 1 tablet (1 g total) by mouth 3 (three) times daily as needed (dyspepsia). 90 tablet 0    venlafaxine (EFFEXOR-XR) 75 MG 24 hr capsule Take 1 capsule (75 mg total) by mouth once daily. 90 capsule 3    zolpidem (AMBIEN) 5 MG Tab Take 1 tablet (5 mg total) by mouth nightly as needed (Insomnia.). 20 tablet 0     Current Facility-Administered Medications on File Prior to Visit   Medication Dose Route Frequency Provider Last Rate Last Admin    0.9%  NaCl infusion   Intravenous Continuous Kristian Wakefield MD        sodium chloride 0.9% flush 10 mL  10 mL Intravenous PRN Kristian Wakefield MD             Objective Findings:    Vital Signs:  /82   Pulse 68   Ht 5' 3" (1.6 m)   Wt 71.2 kg (156 lb 14.4 oz)   LMP 04/21/2021   SpO2 98%   BMI 27.79 kg/m²   Body mass index is 27.79 kg/m².    Physical Exam:  Physical Exam  Vitals reviewed.   Constitutional: "       General: She is not in acute distress.     Appearance: She is not toxic-appearing or diaphoretic.   Eyes:      General: No scleral icterus.  Abdominal:      General: Abdomen is flat. Bowel sounds are normal. There is no distension.      Palpations: Abdomen is soft.      Tenderness: There is no abdominal tenderness. There is no guarding or rebound.   Skin:     General: Skin is warm and dry.      Coloration: Skin is not jaundiced.   Neurological:      Mental Status: She is alert and oriented to person, place, and time.           Labs:  Lab Results   Component Value Date    WBC 5.88 10/06/2022    HGB 12.6 10/06/2022    HCT 36.5 (L) 10/06/2022     10/06/2022    CRP 1.2 08/23/2022    CHOL 252 (H) 07/15/2022    TRIG 75 07/15/2022     (H) 07/15/2022    ALKPHOS 127 10/06/2022    LIPASE 103 (H) 10/05/2022    ALT 27 10/06/2022    AST 22 10/06/2022     10/06/2022    K 3.6 10/06/2022     10/06/2022    CREATININE 0.7 10/06/2022    BUN 6 10/06/2022    CO2 26 10/06/2022    TSH 0.926 07/15/2022    HGBA1C 5.7 (H) 10/05/2022       Imaging reviewed:  CT Abdomen Pelvis With Contrast 10/5/22  FINDINGS:  Imaged lung bases are clear.  Base of the heart is within normal limits.     Remote cholecystectomy.  Grossly similar mild prominence of the central intrahepatic and extrahepatic biliary ducts.     Liver is normal in size with diffuse parenchymal hypoattenuation and multifocal scattered areas of ill-defined parenchymal enhancement throughout each lobe suggesting sequela of transient hepatic attenuation difference.  Main portal vein appears patent.  Spleen is normal in size noting a few scattered small parenchymal calcified granulomas.  Stomach is mild-to-moderately distended with intraluminal contents without wall thickening or adjacent inflammation.  Duodenum is normal in caliber.  Bilateral adrenal glands are within normal limits.  Suspected tiny accessory splenule along the inferior splenic hilum.      Pancreas is normal in size noting slight heterogeneous hypoattenuating appearance of the pancreatic head and neck with mild stranding about the pancreatic head and neck concerning for sequela of regional acute pancreatitis.  No discrete enhancing pancreatic mass, parenchymal calcification, pseudocyst, significant ductal dilatation or peripancreatic fluid collection seen at this time.  Celiac axis and SMA are patent.  C[place] is patent.  No significant aortic atherosclerosis.  No aortic aneurysm or dissection.     Bilateral kidneys are normal in size, shape and location with symmetric normal enhancement.  No hydronephrosis or significant perinephric stranding.  Few scattered tiny hypoattenuating cortical foci at each kidney which are too small to characterize.  Ureters are nondilated.  Urinary bladder is suboptimally distended.  Uterus and bilateral adnexa are grossly within normal limits.  Pelvic phleboliths noted.  No significant amount of free fluid seen in the pelvis.     No ascites, free air or lymphadenopathy by CT criteria.     Appendix and terminal ileum are within normal limits.  A few scattered colonic diverticula without acute diverticulitis.  No convincing evidence of bowel obstruction or acute inflammation.  No pneumatosis or portal venous gas.     Osseous structures appear stable without acute process seen.     Impression:     1. Findings concerning for acute, uncomplicated regional pancreatitis involving the head and neck.  No discrete pancreatic mass; however, given the regional location underlying infiltrative type pancreatic neoplastic process not excluded on the basis of this examination.  Consider follow-up with imaging after acute illness has resolved to exclude underlying neoplasm.  2. Remote cholecystectomy.  3. Suspected hepatic steatosis with multifocal scattered ill-defined areas of parenchymal enhancement suggesting sequela of transient hepatic attenuation differences, noting no discrete  hepatic mass seen.  Clinical correlation and with LFTs advised.  4. Minimal colonic diverticulosis without acute diverticulitis.  5. Few additional findings as above.      MRI MRCP Without Contrast 9/24/22  FINDINGS:  ABDOMEN:     LIVER: Normal in size with normal homogeneous background signal.  No focal lesion.     SPLEEN: Normal size without focal lesion.     PANCREAS: Normal parenchymal signal.  No definite peripancreatic inflammatory changes.  No organized fluid collection.  Incompletely characterized on noncontrast imaging.     ADRENAL: Normal.     KIDNEY: No solid renal mass.  No hydronephrosis.     RETROPERITONEUM: No adenopathy.     MRCP:     Gallbladder is surgically absent.  Small cystic duct remnant adjacent to the bile duct.  Mild prominence of central and extrahepatic biliary ducts.  No distal intrahepatic biliary duct dilatation.  Common bile duct measures up to 8 mm.  Slight abrupt tapering within the distal common bowel duct, just proximal to the pancreatic head (coronal series 3, image 22).  Distal common bile duct at the level the pancreatic head and periampullary region tapers appropriately.  No high-grade stricture or retained stone.  Pancreatic duct measures within normal limits.     Impression:     Mild prominence of central intrahepatic and extrahepatic biliary ducts in the setting of cholecystectomy.  No evidence of high-grade stricture or retained stone.     No pancreatic inflammatory changes identified.     Additional findings as above.    Endoscopy reviewed:  Upper EUS 10/15/21  Impression:            - The celiac trunk was endosonographically normal.                          - There was no sign of significant pathology in                          the common bile duct.                          - A mass was identified in the pancreatic tail.                          This was staged T1 N0 Mx by endosonographic                          criteria. The staging applies if malignancy is                           confirmed. Fine needle biopsy performed. Likely                          inflammatory.                          - Pancreatic parenchymal abnormalities consisting                          of hyperechoic strands were noted in the entire                          pancreas.   Final Pathologic Diagnosis Pancreas, tail (biopsy):   Fat necrosis, debris, pigment and blood   Small fragment of benign oxyntic type mucosa   Multiple deeper levels examined          Assessment:  1. Recurrent pancreatitis      Recommendations:  Low fat diabetic diet  Follow up with GI RD  ETOH cessation  CT A/P pancreas protocol in 2 weeks to assess for improvement of pancreatitis. Can consider EUS if pancreatic head abnormalities persistent on CT.           Thank you so much for allowing me to participate in the care of Joan Rizvi.         Gayle Suazo, MSN, FNP-C  Advanced Endoscopy Nurse Practitioner  [facility]

## 2022-12-04 ENCOUNTER — TELEPHONE (OUTPATIENT)
Dept: ENDOSCOPY | Facility: HOSPITAL | Age: 52
End: 2022-12-04
Payer: COMMERCIAL

## 2022-12-06 ENCOUNTER — PATIENT MESSAGE (OUTPATIENT)
Dept: ENDOSCOPY | Facility: HOSPITAL | Age: 52
End: 2022-12-06
Payer: COMMERCIAL

## 2022-12-08 ENCOUNTER — PATIENT OUTREACH (OUTPATIENT)
Dept: ADMINISTRATIVE | Facility: OTHER | Age: 52
End: 2022-12-08
Payer: COMMERCIAL

## 2022-12-12 NOTE — PROGRESS NOTES
CHW - Outreach Attempt    Community Health Worker left a voicemail message for 1st attempt to contact patient regarding: DEYSI  Community Health Worker to attempt to contact patient on: 12/19        CHW - Outreach Attempt    Community Health Worker could not leave a voicemail message for 2nd attempt to contact patient regarding: DEYSI  Community Health Worker to attempt to contact patient on: 01/05        CHW - Outreach Attempt    Community Health Worker could not leave a voicemail message for 3rd attempt to contact patient regarding: DEYSI        CHW - Unable to Contact    Community Health Worker to close episode at this time due to three missed attempts for patient contact.

## 2022-12-13 ENCOUNTER — PATIENT MESSAGE (OUTPATIENT)
Dept: PRIMARY CARE CLINIC | Facility: CLINIC | Age: 52
End: 2022-12-13
Payer: COMMERCIAL

## 2022-12-14 NOTE — PROGRESS NOTES
ALLERGY & IMMUNOLOGY CLINIC - INITIAL CONSULTATION     HISTORY OF PRESENT ILLNESS     Patient ID: Joan Rizvi is a 52 y.o. female    CC: recurrent pruritus and rash    HPI: Joan Rizvi is a 52 y.o. female presenting for recurrent pruritus and rash.    She says current episode started about 4 days ago. Has happened a few times before. Last time it happened was in 9/2022 lasted 7-8 days. Last time it happened before that was few years ago.    Pruritus is all over body from scalp to bottom of feet. Started about 4 days. She gets little bumps (look just like goose bumps). The bumps aren't currently prominent, but she says they become more noticeable when the pruritus is worse. No urticaria or angioedema. No new medications. She hasn't had NSAIDs recently.   In the past, it has passed on its own.  She has tried benadryl, which might help a little, makes her fall asleep.   When she has the episodes, the itching is constant.   Hot water seems to make it worse. No other exacerbating factors identified. She has not been drinking alcohol recently.  Calamine lotion helps.   She feels like clothes irritate her. She says in between episodes, the bumps can stick around.   She says it is sometimes burning and painful.    Patient denies fevers, chills, unexplained weight loss, blood in stool, melena, easy bruising or bleeding, unusual lumps or bumps. + NS attributed to menopause.  Patient reports she is up to date on colonoscopy, mammogram and pap.  She has an appointment coming up with rheum for joint pains.     REVIEW OF SYSTEMS     CONST: no F/C, no unexplained weight changes  PULM: no SOB, no wheezing, no cough  GI: no BRBPR/melena  H/O: no easy bruising or bleeding  MSK: + joint pains  DERM: + rashes, + pruritus      MEDICAL HISTORY     Vaccines:    Immunization History   Administered Date(s) Administered    COVID-19, MRNA, LN-S, PF (Pfizer) (Purple Cap) 03/19/2021, 04/09/2021, 12/10/2021    Tdap 09/19/2017      Medical Hx:   Patient Active Problem List   Diagnosis    Pancreatic lesion    History of pancreatitis    Essential hypertension    Gastroesophageal reflux disease without esophagitis    Snoring    Overweight (BMI 25.0-29.9)    Perimenopausal symptoms    Vaccine refused by patient    Vitamin D deficiency    Type 2 diabetes mellitus, without long-term current use of insulin    Hyperlipidemia associated with type 2 diabetes mellitus    Wears contact lenses    Refractive error    Screening for colon cancer    Abnormal finding on GI tract imaging    Acute on chronic pancreatitis     Surgical Hx:   Past Surgical History:   Procedure Laterality Date     SECTION      COLONOSCOPY N/A 3/18/2021    Procedure: COLONOSCOPY;  Surgeon: Celestino Walker MD;  Location: [facility] (4TH FLR);  Service: Endoscopy;  Laterality: N/A;  covid screening PCW 3/15 - christen    ENDOMETRIAL ABLATION      ENDOSCOPIC ULTRASOUND OF UPPER GASTROINTESTINAL TRACT N/A 2019    Procedure: ULTRASOUND, UPPER GI TRACT, ENDOSCOPIC;  Surgeon: Kristian Wakefield MD;  Location: [facility] (2ND FLR);  Service: Endoscopy;  Laterality: N/A;  To be done end of  along with EGD evaluation to r/o PUD.    ENDOSCOPIC ULTRASOUND OF UPPER GASTROINTESTINAL TRACT N/A 2019    Procedure: ULTRASOUND, UPPER GI TRACT, ENDOSCOPIC;  Surgeon: Po Noonan MD;  Location: [facility] (2ND FLR);  Service: Endoscopy;  Laterality: N/A;  CT still shows a spot in the pancreas. It has not changed, and likely just represents an area of inflammation, but I think we should take another look with EUS.  Dr SHERLEY Wakefield  PM prep - pg  10/31/19 appt confirmed-rb    ENDOSCOPIC ULTRASOUND OF UPPER GASTROINTESTINAL TRACT N/A 10/15/2021    Procedure: ULTRASOUND, UPPER GI TRACT, ENDOSCOPIC;  Surgeon: Rick Mooney MD;  Location: [facility] (2ND FLR);  Service: Endoscopy;  Laterality: N/A;  Covid-19 test 10/12 [facility]-instructions sent via portal 10/5-MG    ESOPHAGOGASTRODUODENOSCOPY  N/A 6/24/2019    Procedure: EGD (ESOPHAGOGASTRODUODENOSCOPY);  Surgeon: Kristian Wakefield MD;  Location: [facility] (2ND FLR);  Service: Endoscopy;  Laterality: N/A;    LAPAROSCOPIC CHOLECYSTECTOMY N/A 7/24/2019    Procedure: CHOLECYSTECTOMY, LAPAROSCOPIC;  Surgeon: Huber Neri MD;  Location: [facility] OR 2ND FLR;  Service: General;  Laterality: N/A;    TUBAL LIGATION  04/1994    upper gi       Medications:   Current Outpatient Medications on File Prior to Visit   Medication Sig Dispense Refill    alcohol swabs (ALCOHOL WIPES) PadM Monitor as directed. Per insurance coverage. 200 each 3    amLODIPine (NORVASC) 10 MG tablet Take 1 tablet (10 mg total) by mouth once daily. 90 tablet 0    atorvastatin (LIPITOR) 20 MG tablet Take 1 tablet (20 mg total) by mouth once daily. 90 tablet 3    blood sugar diagnostic Strp Monitor as directed twice daily. Per insurance coverage. (Patient taking differently: Monitor as directed twice daily. Per insurance coverage.) 200 each 3    blood-glucose meter kit Monitor as directed. Per insurance coverage. 1 each 0    cetirizine (ZYRTEC) 10 MG tablet TAKE 1 TABLET BY MOUTH EVERY DAY AS NEEDED FOR ALLERGY 90 tablet 3    chlorhexidine (PERIDEX) 0.12 % solution SMARTSIG:15 By Mouth 3 Times Daily      dicyclomine (BENTYL) 10 MG capsule Take 1 capsule (10 mg total) by mouth 3 (three) times daily as needed (Abdominal pain.). 90 capsule 0    ergocalciferol (ERGOCALCIFEROL) 50,000 unit Cap Take 1 capsule (50,000 Units total) by mouth every 7 days. 12 capsule 3    fluticasone propionate (FLONASE) 50 mcg/actuation nasal spray Instill 2 sprays (100 mcg total) in each nostril 2 (two) times daily as needed for Rhinitis or Allergies. 16 g 11    lancets Misc Per insurance coverage. 200 each 3    metFORMIN (GLUCOPHAGE-XR) 500 MG ER 24hr tablet Take 1 tablet (500 mg total) by mouth 2 (two) times a day. 180 tablet 0    pantoprazole (PROTONIX) 40 MG tablet Take 1 tablet (40 mg total) by mouth once daily. 90  tablet 0    senna-docusate 8.6-50 mg (PERICOLACE) 8.6-50 mg per tablet Take 1 tablet by mouth 2 (two) times daily as needed.      zolpidem (AMBIEN) 5 MG Tab Take 1 tablet (5 mg total) by mouth nightly as needed (Insomnia.). 20 tablet 0    lipase-protease-amylase 24,000-76,000-120,000 units (CREON) 24,000-76,000 -120,000 unit capsule Take 2 capsules by mouth 4 (four) times daily with meals and nightly. 240 capsule 1    sucralfate (CARAFATE) 1 gram tablet Take 1 tablet (1 g total) by mouth 3 (three) times daily as needed (dyspepsia). (Patient not taking: Reported on 11/23/2022) 90 tablet 0    venlafaxine (EFFEXOR-XR) 75 MG 24 hr capsule Take 1 capsule (75 mg total) by mouth once daily. (Patient not taking: Reported on 11/23/2022) 90 capsule 3    [DISCONTINUED] meloxicam (MOBIC) 15 MG tablet Take 1 tablet (15 mg total) by mouth daily as needed (Pain and inflammation.). (Patient not taking: Reported on 11/23/2022) 30 tablet 0     Current Facility-Administered Medications on File Prior to Visit   Medication Dose Route Frequency Provider Last Rate Last Admin    0.9%  NaCl infusion   Intravenous Continuous Kristian Wakefield MD        sodium chloride 0.9% flush 10 mL  10 mL Intravenous PRN Kristian Wakefield MD         H/o Asthma: denies  H/o Eczema: denies    Drug Allergies:   Review of patient's allergies indicates:   Allergen Reactions    Pineapple Swelling    Hydrochlorothiazide Other (See Comments)     CAUSING PANCREATITIS      Social Hx:   Social History     Tobacco Use    Smoking status: Former     Years: 10.00     Types: Cigarettes    Smokeless tobacco: Never    Tobacco comments:     2017   Substance Use Topics    Alcohol use: Yes     Alcohol/week: 2.0 standard drinks     Types: 2 Glasses of wine per week     Comment: social     Drug use: No     Family Hx:   Family History   Problem Relation Age of Onset    Heart failure Mother     Stroke Mother     Aneurysm Mother     Alcohol abuse Mother     Diabetes Maternal Aunt      "Diabetes Maternal Uncle     Heart disease Maternal Uncle     Hypertension Father     Alcohol abuse Father     Liver disease Father     No Known Problems Sister     No Known Problems Maternal Grandmother     No Known Problems Maternal Grandfather     Coronary artery disease Paternal Grandmother     Dementia Paternal Grandmother     Dementia Paternal Grandfather     No Known Problems Sister     No Known Problems Brother     No Known Problems Paternal Aunt     No Known Problems Paternal Uncle     Colon cancer Neg Hx     Esophageal cancer Neg Hx     Ovarian cancer Neg Hx     Breast cancer Neg Hx     Amblyopia Neg Hx     Blindness Neg Hx     Cancer Neg Hx     Cataracts Neg Hx     Glaucoma Neg Hx     Macular degeneration Neg Hx     Retinal detachment Neg Hx     Strabismus Neg Hx     Thyroid disease Neg Hx       PHYSICAL EXAM     VS: Ht 5' 2" (1.575 m)   Wt 70.8 kg (156 lb 1.4 oz)   LMP 04/21/2021   BMI 28.55 kg/m²   GENERAL: Alert, NAD, well-appearing, cooperative  EYES: EOMI, no conjunctival injection, no discharge, no infraorbital shiners  ORAL: MMM, no ulcers, no thrush  NECK: no thyromegaly, no LAD  LUNGS: CTAB, no w/r/c, no increased WOB  HEART: RRR, normal S1/S2, no m/g/r  EXTREMITIES: No LE edema  DERM: + excoriations on back, otherwise no rashes, no skin breaks  NEURO: normal speech, normal gait, no facial asymmetry     LABORATORY STUDIES     11/8/22:     CBC/diff - within acceptable ranges, total eos 20    11/7/22:     CMP - calcium 10.4, alk phos 137, otherwise within acceptable ranges    Component      Latest Ref Rng & Units 8/23/2022 7/15/2022   Hemoglobin A1C External      4.0 - 5.6 %  5.7 (H)   Estimated Avg Glucose      68 - 131 mg/dL  117   TSH      0.400 - 4.000 uIU/mL  0.926   HIV 1/2 Ag/Ab      Negative  Negative   IVETH Screen      Negative <1:80 Negative <1:80    Sed Rate      0 - 36 mm/Hr 3    CRP      0.0 - 8.2 mg/L 1.2    CCP Antibodies      <5.0 U/mL <0.5    Rheumatoid Factor      0.0 - 15.0 " "IU/mL <13.0       IMAGING & OTHER DIAGNOSTICS     CT abd/pelv 10/5/22:  Impression:  1. Findings concerning for acute, uncomplicated regional pancreatitis involving the head and neck.  No discrete pancreatic mass; however, given the regional location underlying infiltrative type pancreatic neoplastic process not excluded on the basis of this examination.  Consider follow-up with imaging after acute illness has resolved to exclude underlying neoplasm.  2. Remote cholecystectomy.  3. Suspected hepatic steatosis with multifocal scattered ill-defined areas of parenchymal enhancement suggesting sequela of transient hepatic attenuation differences, noting no discrete hepatic mass seen.  Clinical correlation and with LFTs advised.  4. Minimal colonic diverticulosis without acute diverticulitis.  5. Few additional findings as above.    CXR 5/22/19:  FINDINGS:  Mediastinal structures are midline. Cardiac silhouette and pulmonary vascular distribution are normal.  Lung volumes are normal and symmetric. I detect no pulmonary disease, pleural fluid, lymph node enlargement, cardiac decompensation, pneumothorax, pneumomediastinum, pneumoperitoneum or significant osseous abnormality.  IMPRESSION:   No convincing evidence of disease.  No source for chest pain identified.     CHART REVIEW     Reviewed IM notes, labs, imaging.     ASSESSMENT & PLAN     Joan Rizvi is a 52 y.o. female with     # Pruritus and rash: She has had a few episodes of pruritus with a rash she describes as looking like "goose bumps". Current episode started 4 days ago, before that was in 9/2022 lasting 7-8 days, before that was a few years ago. Currently, I don't appreciate a rash, but she says it can look more prominent when the pruritus is worse. She says the pruritus is everywhere, from scalp to soles of feet. Benadryl might help a little but causes sedation. I am unsure of etiology.  -instead of benadryl, trial cetirizine up to 20 mg BID.  -referral " for dermatology placed.  -advised her to take photos when the rash is more visible.     # Joint pains: she has appointment coming up with rheumatology.    Follow up: as needed.      Chang Olson MD  Allergy/Immunology

## 2022-12-15 ENCOUNTER — OFFICE VISIT (OUTPATIENT)
Dept: ALLERGY | Facility: CLINIC | Age: 52
End: 2022-12-15
Payer: COMMERCIAL

## 2022-12-15 VITALS — WEIGHT: 156.06 LBS | BODY MASS INDEX: 28.72 KG/M2 | HEIGHT: 62 IN

## 2022-12-15 DIAGNOSIS — R21 PAPULAR RASH: ICD-10-CM

## 2022-12-15 DIAGNOSIS — M25.50 PAIN IN JOINT, MULTIPLE SITES: ICD-10-CM

## 2022-12-15 DIAGNOSIS — L29.9 PRURITUS: Primary | ICD-10-CM

## 2022-12-15 PROCEDURE — 3008F BODY MASS INDEX DOCD: CPT | Mod: CPTII,S$GLB,, | Performed by: STUDENT IN AN ORGANIZED HEALTH CARE EDUCATION/TRAINING PROGRAM

## 2022-12-15 PROCEDURE — 3072F PR LOW RISK FOR RETINOPATHY: ICD-10-PCS | Mod: CPTII,S$GLB,, | Performed by: STUDENT IN AN ORGANIZED HEALTH CARE EDUCATION/TRAINING PROGRAM

## 2022-12-15 PROCEDURE — 1160F PR REVIEW ALL MEDS BY PRESCRIBER/CLIN PHARMACIST DOCUMENTED: ICD-10-PCS | Mod: CPTII,S$GLB,, | Performed by: STUDENT IN AN ORGANIZED HEALTH CARE EDUCATION/TRAINING PROGRAM

## 2022-12-15 PROCEDURE — 3061F NEG MICROALBUMINURIA REV: CPT | Mod: CPTII,S$GLB,, | Performed by: STUDENT IN AN ORGANIZED HEALTH CARE EDUCATION/TRAINING PROGRAM

## 2022-12-15 PROCEDURE — 3061F PR NEG MICROALBUMINURIA RESULT DOCUMENTED/REVIEW: ICD-10-PCS | Mod: CPTII,S$GLB,, | Performed by: STUDENT IN AN ORGANIZED HEALTH CARE EDUCATION/TRAINING PROGRAM

## 2022-12-15 PROCEDURE — 99204 PR OFFICE/OUTPT VISIT, NEW, LEVL IV, 45-59 MIN: ICD-10-PCS | Mod: S$GLB,,, | Performed by: STUDENT IN AN ORGANIZED HEALTH CARE EDUCATION/TRAINING PROGRAM

## 2022-12-15 PROCEDURE — 1160F RVW MEDS BY RX/DR IN RCRD: CPT | Mod: CPTII,S$GLB,, | Performed by: STUDENT IN AN ORGANIZED HEALTH CARE EDUCATION/TRAINING PROGRAM

## 2022-12-15 PROCEDURE — 1159F MED LIST DOCD IN RCRD: CPT | Mod: CPTII,S$GLB,, | Performed by: STUDENT IN AN ORGANIZED HEALTH CARE EDUCATION/TRAINING PROGRAM

## 2022-12-15 PROCEDURE — 3066F NEPHROPATHY DOC TX: CPT | Mod: CPTII,S$GLB,, | Performed by: STUDENT IN AN ORGANIZED HEALTH CARE EDUCATION/TRAINING PROGRAM

## 2022-12-15 PROCEDURE — 3044F HG A1C LEVEL LT 7.0%: CPT | Mod: CPTII,S$GLB,, | Performed by: STUDENT IN AN ORGANIZED HEALTH CARE EDUCATION/TRAINING PROGRAM

## 2022-12-15 PROCEDURE — 1159F PR MEDICATION LIST DOCUMENTED IN MEDICAL RECORD: ICD-10-PCS | Mod: CPTII,S$GLB,, | Performed by: STUDENT IN AN ORGANIZED HEALTH CARE EDUCATION/TRAINING PROGRAM

## 2022-12-15 PROCEDURE — 3008F PR BODY MASS INDEX (BMI) DOCUMENTED: ICD-10-PCS | Mod: CPTII,S$GLB,, | Performed by: STUDENT IN AN ORGANIZED HEALTH CARE EDUCATION/TRAINING PROGRAM

## 2022-12-15 PROCEDURE — 99999 PR PBB SHADOW E&M-EST. PATIENT-LVL III: ICD-10-PCS | Mod: PBBFAC,,, | Performed by: STUDENT IN AN ORGANIZED HEALTH CARE EDUCATION/TRAINING PROGRAM

## 2022-12-15 PROCEDURE — 3066F PR DOCUMENTATION OF TREATMENT FOR NEPHROPATHY: ICD-10-PCS | Mod: CPTII,S$GLB,, | Performed by: STUDENT IN AN ORGANIZED HEALTH CARE EDUCATION/TRAINING PROGRAM

## 2022-12-15 PROCEDURE — 99204 OFFICE O/P NEW MOD 45 MIN: CPT | Mod: S$GLB,,, | Performed by: STUDENT IN AN ORGANIZED HEALTH CARE EDUCATION/TRAINING PROGRAM

## 2022-12-15 PROCEDURE — 3044F PR MOST RECENT HEMOGLOBIN A1C LEVEL <7.0%: ICD-10-PCS | Mod: CPTII,S$GLB,, | Performed by: STUDENT IN AN ORGANIZED HEALTH CARE EDUCATION/TRAINING PROGRAM

## 2022-12-15 PROCEDURE — 99999 PR PBB SHADOW E&M-EST. PATIENT-LVL III: CPT | Mod: PBBFAC,,, | Performed by: STUDENT IN AN ORGANIZED HEALTH CARE EDUCATION/TRAINING PROGRAM

## 2022-12-15 PROCEDURE — 3072F LOW RISK FOR RETINOPATHY: CPT | Mod: CPTII,S$GLB,, | Performed by: STUDENT IN AN ORGANIZED HEALTH CARE EDUCATION/TRAINING PROGRAM

## 2022-12-15 RX ORDER — CETIRIZINE HYDROCHLORIDE 10 MG/1
10 TABLET ORAL DAILY
Qty: 90 TABLET | Refills: 3 | Status: SHIPPED | OUTPATIENT
Start: 2022-12-15 | End: 2023-01-05 | Stop reason: SDUPTHER

## 2022-12-19 ENCOUNTER — OFFICE VISIT (OUTPATIENT)
Dept: RHEUMATOLOGY | Facility: CLINIC | Age: 52
End: 2022-12-19
Payer: COMMERCIAL

## 2022-12-19 ENCOUNTER — HOSPITAL ENCOUNTER (OUTPATIENT)
Dept: RADIOLOGY | Facility: HOSPITAL | Age: 52
Discharge: HOME | End: 2022-12-19
Attending: INTERNAL MEDICINE
Payer: COMMERCIAL

## 2022-12-19 VITALS
HEART RATE: 87 BPM | SYSTOLIC BLOOD PRESSURE: 119 MMHG | OXYGEN SATURATION: 99 % | HEIGHT: 62 IN | WEIGHT: 160 LBS | RESPIRATION RATE: 20 BRPM | BODY MASS INDEX: 29.44 KG/M2 | DIASTOLIC BLOOD PRESSURE: 77 MMHG

## 2022-12-19 DIAGNOSIS — Z71.89 COUNSELING AND COORDINATION OF CARE: ICD-10-CM

## 2022-12-19 DIAGNOSIS — Z79.1 NSAID LONG-TERM USE: ICD-10-CM

## 2022-12-19 DIAGNOSIS — M25.541 PAIN INVOLVING JOINTS OF FINGERS OF BOTH HANDS: ICD-10-CM

## 2022-12-19 DIAGNOSIS — M25.542 PAIN INVOLVING JOINTS OF FINGERS OF BOTH HANDS: ICD-10-CM

## 2022-12-19 DIAGNOSIS — M15.9 PRIMARY OSTEOARTHRITIS INVOLVING MULTIPLE JOINTS: Primary | ICD-10-CM

## 2022-12-19 PROCEDURE — 3008F BODY MASS INDEX DOCD: CPT | Mod: CPTII,S$GLB,, | Performed by: INTERNAL MEDICINE

## 2022-12-19 PROCEDURE — 3074F PR MOST RECENT SYSTOLIC BLOOD PRESSURE < 130 MM HG: ICD-10-PCS | Mod: CPTII,S$GLB,, | Performed by: INTERNAL MEDICINE

## 2022-12-19 PROCEDURE — 99215 PR OFFICE/OUTPT VISIT, EST, LEVL V, 40-54 MIN: ICD-10-PCS | Mod: S$GLB,,, | Performed by: INTERNAL MEDICINE

## 2022-12-19 PROCEDURE — 3061F NEG MICROALBUMINURIA REV: CPT | Mod: CPTII,S$GLB,, | Performed by: INTERNAL MEDICINE

## 2022-12-19 PROCEDURE — 99999 PR PBB SHADOW E&M-EST. PATIENT-LVL V: CPT | Mod: PBBFAC,,, | Performed by: INTERNAL MEDICINE

## 2022-12-19 PROCEDURE — 3066F PR DOCUMENTATION OF TREATMENT FOR NEPHROPATHY: ICD-10-PCS | Mod: CPTII,S$GLB,, | Performed by: INTERNAL MEDICINE

## 2022-12-19 PROCEDURE — 3044F HG A1C LEVEL LT 7.0%: CPT | Mod: CPTII,S$GLB,, | Performed by: INTERNAL MEDICINE

## 2022-12-19 PROCEDURE — 77077 XR ARTHRITIS SURVEY: ICD-10-PCS | Mod: 26,,, | Performed by: RADIOLOGY

## 2022-12-19 PROCEDURE — 3074F SYST BP LT 130 MM HG: CPT | Mod: CPTII,S$GLB,, | Performed by: INTERNAL MEDICINE

## 2022-12-19 PROCEDURE — 3044F PR MOST RECENT HEMOGLOBIN A1C LEVEL <7.0%: ICD-10-PCS | Mod: CPTII,S$GLB,, | Performed by: INTERNAL MEDICINE

## 2022-12-19 PROCEDURE — 1159F PR MEDICATION LIST DOCUMENTED IN MEDICAL RECORD: ICD-10-PCS | Mod: CPTII,S$GLB,, | Performed by: INTERNAL MEDICINE

## 2022-12-19 PROCEDURE — 99999 PR PBB SHADOW E&M-EST. PATIENT-LVL V: ICD-10-PCS | Mod: PBBFAC,,, | Performed by: INTERNAL MEDICINE

## 2022-12-19 PROCEDURE — 3072F PR LOW RISK FOR RETINOPATHY: ICD-10-PCS | Mod: CPTII,S$GLB,, | Performed by: INTERNAL MEDICINE

## 2022-12-19 PROCEDURE — 77077 JOINT SURVEY SINGLE VIEW: CPT | Mod: 26,,, | Performed by: RADIOLOGY

## 2022-12-19 PROCEDURE — 99215 OFFICE O/P EST HI 40 MIN: CPT | Mod: S$GLB,,, | Performed by: INTERNAL MEDICINE

## 2022-12-19 PROCEDURE — 3061F PR NEG MICROALBUMINURIA RESULT DOCUMENTED/REVIEW: ICD-10-PCS | Mod: CPTII,S$GLB,, | Performed by: INTERNAL MEDICINE

## 2022-12-19 PROCEDURE — 3078F PR MOST RECENT DIASTOLIC BLOOD PRESSURE < 80 MM HG: ICD-10-PCS | Mod: CPTII,S$GLB,, | Performed by: INTERNAL MEDICINE

## 2022-12-19 PROCEDURE — 3066F NEPHROPATHY DOC TX: CPT | Mod: CPTII,S$GLB,, | Performed by: INTERNAL MEDICINE

## 2022-12-19 PROCEDURE — 1159F MED LIST DOCD IN RCRD: CPT | Mod: CPTII,S$GLB,, | Performed by: INTERNAL MEDICINE

## 2022-12-19 PROCEDURE — 3078F DIAST BP <80 MM HG: CPT | Mod: CPTII,S$GLB,, | Performed by: INTERNAL MEDICINE

## 2022-12-19 PROCEDURE — 77077 JOINT SURVEY SINGLE VIEW: CPT | Mod: TC,PO

## 2022-12-19 PROCEDURE — 3072F LOW RISK FOR RETINOPATHY: CPT | Mod: CPTII,S$GLB,, | Performed by: INTERNAL MEDICINE

## 2022-12-19 PROCEDURE — 3008F PR BODY MASS INDEX (BMI) DOCUMENTED: ICD-10-PCS | Mod: CPTII,S$GLB,, | Performed by: INTERNAL MEDICINE

## 2022-12-19 RX ORDER — MELOXICAM 15 MG/1
15 TABLET ORAL DAILY
Qty: 30 TABLET | Refills: 6 | Status: SHIPPED | OUTPATIENT
Start: 2022-12-19 | End: 2023-04-13 | Stop reason: SDUPTHER

## 2022-12-19 NOTE — PROGRESS NOTES
RHEUMATOLOGY OUTPATIENT CLINIC NOTE    12/19/2022    Attending Rheumatologist: Edgar Rodgers  Primary Care Provider: Zeynep Salgado MD   Physician Requesting Consultation: Melissa Neely MD  [address] [place] [address]  Chief Complaint/Reason For Consultation:  No chief complaint on file.      Subjective:       HPI  Joan Rizvi is a 52 y.o. Black or  female with medical history noted below who presents for evaluation of joint pain.     Patient presents for evaluation of joint pain. She notes over the last 6 months the onset of progressive joint pain which started in her Right thumb. Now affecting the whole hand. She notes intermittent swelling. Morning stiffness lasting about an hour. Notes using topical NSAIDs and warm water with improvement. Weather changes make it worse otherwise no clear triggers of exacerbating affects. She also reports a rash and dry skin, no PsO. No dactylitis. Occasional hip pain as well. +Fatigue, night sweats (started after menopause). No wt loss, Chills or fever, no gout attacks. FHX-Arthritis/Gout.       Review of Systems   Constitutional:  Positive for fatigue. Negative for chills, fever and unexpected weight change.   HENT:  Negative for mouth sores and trouble swallowing.    Eyes:  Negative for redness and eye dryness.   Respiratory:  Negative for cough and shortness of breath.    Cardiovascular:  Negative for chest pain.   Gastrointestinal:  Negative for abdominal distention, constipation, diarrhea, nausea and vomiting.   Genitourinary:  Negative for dysuria, genital sores and vaginal dryness.   Musculoskeletal:  Positive for arthralgias, leg pain and myalgias. Negative for back pain, gait problem, joint swelling, neck pain, neck stiffness and joint deformity.   Integumentary:  Positive for rash.   Neurological:  Negative for weakness, numbness and headaches.   Hematological:  Negative for adenopathy. Bruises/bleeds  easily.   Psychiatric/Behavioral:  Negative for confusion, decreased concentration and sleep disturbance. The patient is not nervous/anxious.    All other systems reviewed and are negative.     Chronic comorbid conditions affecting medical decision making today:  Past Medical History:   Diagnosis Date    GERD (gastroesophageal reflux disease)     Hyperlipidemia associated with type 2 diabetes mellitus     Hypertension     Mental disorder     Pancreatitis     Type 2 diabetes mellitus     Vitamin D deficiency      Past Surgical History:   Procedure Laterality Date     SECTION      COLONOSCOPY N/A 3/18/2021    Procedure: COLONOSCOPY;  Surgeon: Celestino Walker MD;  Location: [facility] ENDO (4TH FLR);  Service: Endoscopy;  Laterality: N/A;  covid screening PCW 3/15 - christen    ENDOMETRIAL ABLATION      ENDOSCOPIC ULTRASOUND OF UPPER GASTROINTESTINAL TRACT N/A 2019    Procedure: ULTRASOUND, UPPER GI TRACT, ENDOSCOPIC;  Surgeon: Kristian Wakefield MD;  Location: [facility] ENDO (2ND FLR);  Service: Endoscopy;  Laterality: N/A;  To be done end of  along with EGD evaluation to r/o PUD.    ENDOSCOPIC ULTRASOUND OF UPPER GASTROINTESTINAL TRACT N/A 2019    Procedure: ULTRASOUND, UPPER GI TRACT, ENDOSCOPIC;  Surgeon: Po Noonan MD;  Location: [facility] ENDO (2ND FLR);  Service: Endoscopy;  Laterality: N/A;  CT still shows a spot in the pancreas. It has not changed, and likely just represents an area of inflammation, but I think we should take another look with EUS.  Dr SHERLEY Wakefield  PM prep - pg  10/31/19 appt confirmed-rb    ENDOSCOPIC ULTRASOUND OF UPPER GASTROINTESTINAL TRACT N/A 10/15/2021    Procedure: ULTRASOUND, UPPER GI TRACT, ENDOSCOPIC;  Surgeon: Rick Mooney MD;  Location: [facility] ENDO (2ND FLR);  Service: Endoscopy;  Laterality: N/A;  Covid-19 test 10/12 [facility]-instructions sent via portal 10/5-MG    ESOPHAGOGASTRODUODENOSCOPY N/A 2019    Procedure: EGD (ESOPHAGOGASTRODUODENOSCOPY);  Surgeon: Kristian PANDYA  MD Twyla;  Location: [facility] ENDO (2ND FLR);  Service: Endoscopy;  Laterality: N/A;    LAPAROSCOPIC CHOLECYSTECTOMY N/A 7/24/2019    Procedure: CHOLECYSTECTOMY, LAPAROSCOPIC;  Surgeon: Huber Neri MD;  Location: [facility] OR 2ND FLR;  Service: General;  Laterality: N/A;    TUBAL LIGATION  04/1994    upper gi       Family History   Problem Relation Age of Onset    Heart failure Mother     Stroke Mother     Aneurysm Mother     Alcohol abuse Mother     Diabetes Maternal Aunt     Diabetes Maternal Uncle     Heart disease Maternal Uncle     Hypertension Father     Alcohol abuse Father     Liver disease Father     No Known Problems Sister     No Known Problems Maternal Grandmother     No Known Problems Maternal Grandfather     Coronary artery disease Paternal Grandmother     Dementia Paternal Grandmother     Dementia Paternal Grandfather     No Known Problems Sister     No Known Problems Brother     No Known Problems Paternal Aunt     No Known Problems Paternal Uncle     Colon cancer Neg Hx     Esophageal cancer Neg Hx     Ovarian cancer Neg Hx     Breast cancer Neg Hx     Amblyopia Neg Hx     Blindness Neg Hx     Cancer Neg Hx     Cataracts Neg Hx     Glaucoma Neg Hx     Macular degeneration Neg Hx     Retinal detachment Neg Hx     Strabismus Neg Hx     Thyroid disease Neg Hx      Social History     Substance and Sexual Activity   Alcohol Use Yes    Alcohol/week: 2.0 standard drinks    Types: 2 Glasses of wine per week    Comment: social      Social History     Tobacco Use   Smoking Status Former    Years: 10.00    Types: Cigarettes   Smokeless Tobacco Never   Tobacco Comments    2017     Social History     Substance and Sexual Activity   Drug Use No       Current Outpatient Medications:     alcohol swabs (ALCOHOL WIPES) PadM, Monitor as directed. Per insurance coverage., Disp: 200 each, Rfl: 3    amLODIPine (NORVASC) 10 MG tablet, Take 1 tablet (10 mg total) by mouth once daily., Disp: 90 tablet, Rfl: 0    atorvastatin  (LIPITOR) 20 MG tablet, Take 1 tablet (20 mg total) by mouth once daily., Disp: 90 tablet, Rfl: 3    blood sugar diagnostic Strp, Monitor as directed twice daily. Per insurance coverage. (Patient taking differently: Monitor as directed twice daily. Per insurance coverage.), Disp: 200 each, Rfl: 3    blood-glucose meter kit, Monitor as directed. Per insurance coverage., Disp: 1 each, Rfl: 0    cetirizine (ZYRTEC) 10 MG tablet, Take 1 tablet (10 mg total) by mouth once daily., Disp: 90 tablet, Rfl: 3    chlorhexidine (PERIDEX) 0.12 % solution, SMARTSIG:15 By Mouth 3 Times Daily, Disp: , Rfl:     dicyclomine (BENTYL) 10 MG capsule, Take 1 capsule (10 mg total) by mouth 3 (three) times daily as needed (Abdominal pain.)., Disp: 90 capsule, Rfl: 0    ergocalciferol (ERGOCALCIFEROL) 50,000 unit Cap, Take 1 capsule (50,000 Units total) by mouth every 7 days., Disp: 12 capsule, Rfl: 3    fluticasone propionate (FLONASE) 50 mcg/actuation nasal spray, Instill 2 sprays (100 mcg total) in each nostril 2 (two) times daily as needed for Rhinitis or Allergies., Disp: 16 g, Rfl: 11    lancets Misc, Per insurance coverage., Disp: 200 each, Rfl: 3    lipase-protease-amylase 24,000-76,000-120,000 units (CREON) 24,000-76,000 -120,000 unit capsule, Take 2 capsules by mouth 4 (four) times daily with meals and nightly., Disp: 240 capsule, Rfl: 1    meloxicam (MOBIC) 15 MG tablet, Take 1 tablet (15 mg total) by mouth once daily., Disp: 30 tablet, Rfl: 6    metFORMIN (GLUCOPHAGE-XR) 500 MG ER 24hr tablet, Take 1 tablet (500 mg total) by mouth 2 (two) times a day., Disp: 180 tablet, Rfl: 0    pantoprazole (PROTONIX) 40 MG tablet, Take 1 tablet (40 mg total) by mouth once daily., Disp: 90 tablet, Rfl: 0    senna-docusate 8.6-50 mg (PERICOLACE) 8.6-50 mg per tablet, Take 1 tablet by mouth 2 (two) times daily as needed., Disp: , Rfl:     sucralfate (CARAFATE) 1 gram tablet, Take 1 tablet (1 g total) by mouth 3 (three) times daily as needed  (dyspepsia). (Patient not taking: Reported on 11/23/2022), Disp: 90 tablet, Rfl: 0    venlafaxine (EFFEXOR-XR) 75 MG 24 hr capsule, Take 1 capsule (75 mg total) by mouth once daily. (Patient not taking: Reported on 11/23/2022), Disp: 90 capsule, Rfl: 3    zolpidem (AMBIEN) 5 MG Tab, Take 1 tablet (5 mg total) by mouth nightly as needed (Insomnia.)., Disp: 20 tablet, Rfl: 0  No current facility-administered medications for this visit.    Facility-Administered Medications Ordered in Other Visits:     0.9%  NaCl infusion, , Intravenous, Continuous, Kristian Wakefield MD    sodium chloride 0.9% flush 10 mL, 10 mL, Intravenous, PRN, Kristian Wakefield MD     Objective:         Vitals:    12/19/22 1050   BP: 119/77   Pulse: 87   Resp: 20     Physical Exam  Can make fist, no synovitis  Right 1st CMC TTP and right thumb heberden node  Shoulder crepitus  Knee crepitus  B/l trochanter bursa TTP  Negative ankle/MTP  No tender points  No rash     Reviewed old and all outside pertinent medical records available.    All lab results personally reviewed and interpreted by me.  Lab Results   Component Value Date    WBC 5.88 10/06/2022    HGB 12.6 10/06/2022    HCT 36.5 (L) 10/06/2022    MCV 99 (H) 10/06/2022    MCH 34.1 (H) 10/06/2022    MCHC 34.5 10/06/2022    RDW 12.8 10/06/2022     10/06/2022    MPV 9.4 10/06/2022    PLTEST Appears normal 07/15/2022       Lab Results   Component Value Date     10/06/2022    K 3.6 10/06/2022     10/06/2022    CO2 26 10/06/2022     (H) 10/06/2022    BUN 6 10/06/2022    CALCIUM 9.6 10/06/2022    PROT 7.1 10/06/2022    ALBUMIN 3.8 10/06/2022    BILITOT 0.5 10/06/2022    AST 22 10/06/2022    ALKPHOS 127 10/06/2022    ALT 27 10/06/2022       Lab Results   Component Value Date    COLORU Yellow 10/05/2022    APPEARANCEUA Cloudy (A) 10/05/2022    SPECGRAV 1.015 10/05/2022    PHUR 5.0 10/05/2022    PROTEINUA 1+ (A) 10/05/2022    KETONESU Trace (A) 10/05/2022    LEUKOCYTESUR Negative  10/05/2022    NITRITE Negative 10/05/2022       Lab Results   Component Value Date    CRP 1.2 08/23/2022       Lab Results   Component Value Date    SEDRATE 3 08/23/2022       Lab Results   Component Value Date    RF <13.0 08/23/2022    SEDRATE 3 08/23/2022       No components found for: 25OHVITDTOT, 30VILOPK2, 69JNZVHC8, METHODNOTE    Lab Results   Component Value Date    URICACID 5.9 (H) 08/23/2022       No components found for: TSPOTTB        Imaging:  All imaging reviewed and independently interpreted by me.         ASSESSMENT / PLAN:     Joan Rizvi is a 52 y.o. Black or  female with:      1. Primary osteoarthritis involving multiple joints  - patients joint pain likely OA  - discussed diagnosis and management  - prior IVETH/RF/CCP Negative  - trial of Meloxicam, SE discussed  - wt loss  - alternative therapies such as paraffin wax, Epson salts and topical gels discussed  - reassurance and exercise   - Ambulatory referral/consult to Rheumatology  - meloxicam (MOBIC) 15 MG tablet; Take 1 tablet (15 mg total) by mouth once daily.  Dispense: 30 tablet; Refill: 6  - XR Arthritis Survey; Future    2. Chronic NSAID use  - no history of GI bleed or coronary artery disease.  - previous labs without features of CKD.  - clinical significance side effect of long-term NSAID use discussed in detail.  - recommended for alternative therapies for pain management.    3. Other specified counseling  - over 10 minutes spent regarding below topics:  - Immunization counseling done.  - Weight loss counseling done.  - Nutrition and exercise counseling.  - Limitation of alcohol consumption.  - Regular exercise:  Aerobic and resistance.  - Medication counseling provided.      Follow up in about 3 months (around 3/19/2023).    Method of contact with patient concerns: [facility]    Disclaimer:  This note is prepared using voice recognition software and as such is likely to have errors and has not been  proof read. Please contact me for questions.     Time spent: 45 minutes in face to face discussion concerning diagnosis, prognosis, review of lab and test results, benefits of treatment as well as management of disease, counseling of patient and coordination of care between various health care providers.  Greater than half the time spent was used for coordination of care and counseling of patient.    Edgar Rodgers M.D.  Rheumatology Department   [facility]

## 2022-12-22 ENCOUNTER — PATIENT MESSAGE (OUTPATIENT)
Dept: DERMATOLOGY | Facility: CLINIC | Age: 52
End: 2022-12-22

## 2022-12-22 ENCOUNTER — OFFICE VISIT (OUTPATIENT)
Dept: DERMATOLOGY | Facility: CLINIC | Age: 52
End: 2022-12-22
Payer: COMMERCIAL

## 2022-12-22 DIAGNOSIS — F43.9 STRESS: ICD-10-CM

## 2022-12-22 DIAGNOSIS — L50.9 HIVES: ICD-10-CM

## 2022-12-22 DIAGNOSIS — L29.9 ITCHING: Primary | ICD-10-CM

## 2022-12-22 DIAGNOSIS — R21 PAPULAR RASH: ICD-10-CM

## 2022-12-22 DIAGNOSIS — Z78.9 DIETING: ICD-10-CM

## 2022-12-22 DIAGNOSIS — Z76.89 ENCOUNTER FOR SKIN CARE: ICD-10-CM

## 2022-12-22 PROCEDURE — 1159F MED LIST DOCD IN RCRD: CPT | Mod: CPTII,S$GLB,, | Performed by: DERMATOLOGY

## 2022-12-22 PROCEDURE — 3044F PR MOST RECENT HEMOGLOBIN A1C LEVEL <7.0%: ICD-10-PCS | Mod: CPTII,S$GLB,, | Performed by: DERMATOLOGY

## 2022-12-22 PROCEDURE — 3072F PR LOW RISK FOR RETINOPATHY: ICD-10-PCS | Mod: CPTII,S$GLB,, | Performed by: DERMATOLOGY

## 2022-12-22 PROCEDURE — 3044F HG A1C LEVEL LT 7.0%: CPT | Mod: CPTII,S$GLB,, | Performed by: DERMATOLOGY

## 2022-12-22 PROCEDURE — 99205 PR OFFICE/OUTPT VISIT, NEW, LEVL V, 60-74 MIN: ICD-10-PCS | Mod: S$GLB,,, | Performed by: DERMATOLOGY

## 2022-12-22 PROCEDURE — 1159F PR MEDICATION LIST DOCUMENTED IN MEDICAL RECORD: ICD-10-PCS | Mod: CPTII,S$GLB,, | Performed by: DERMATOLOGY

## 2022-12-22 PROCEDURE — 3066F NEPHROPATHY DOC TX: CPT | Mod: CPTII,S$GLB,, | Performed by: DERMATOLOGY

## 2022-12-22 PROCEDURE — 99999 PR PBB SHADOW E&M-EST. PATIENT-LVL V: CPT | Mod: PBBFAC,,, | Performed by: DERMATOLOGY

## 2022-12-22 PROCEDURE — 3066F PR DOCUMENTATION OF TREATMENT FOR NEPHROPATHY: ICD-10-PCS | Mod: CPTII,S$GLB,, | Performed by: DERMATOLOGY

## 2022-12-22 PROCEDURE — 99205 OFFICE O/P NEW HI 60 MIN: CPT | Mod: S$GLB,,, | Performed by: DERMATOLOGY

## 2022-12-22 PROCEDURE — 3072F LOW RISK FOR RETINOPATHY: CPT | Mod: CPTII,S$GLB,, | Performed by: DERMATOLOGY

## 2022-12-22 PROCEDURE — 3061F PR NEG MICROALBUMINURIA RESULT DOCUMENTED/REVIEW: ICD-10-PCS | Mod: CPTII,S$GLB,, | Performed by: DERMATOLOGY

## 2022-12-22 PROCEDURE — 3061F NEG MICROALBUMINURIA REV: CPT | Mod: CPTII,S$GLB,, | Performed by: DERMATOLOGY

## 2022-12-22 PROCEDURE — 99999 PR PBB SHADOW E&M-EST. PATIENT-LVL V: ICD-10-PCS | Mod: PBBFAC,,, | Performed by: DERMATOLOGY

## 2022-12-22 RX ORDER — FLUOCINONIDE 0.5 MG/G
CREAM TOPICAL 2 TIMES DAILY
Qty: 60 G | Refills: 0 | Status: SHIPPED | OUTPATIENT
Start: 2022-12-22

## 2022-12-22 NOTE — PROGRESS NOTES
Subjective:       Patient ID:  Jaon Rizvi is a 52 y.o. female who presents for   Chief Complaint   Patient presents with    Rash     Diffuse, x 1wk, itches, Tx Cerave      Rash - Initial  Affected locations: diffuse  Duration: 1 week  Signs / symptoms: itching  Severity: mild  Timing: constant  Aggravated by: friction  Relieving factors/Treatments tried: OTC moisturizer  Improvement on treatment: no relief    Review of Systems   Constitutional:  Negative for fever, chills and fatigue.   HENT:  Negative for sore throat and trouble swallowing.    Respiratory:  Negative for cough.    Musculoskeletal:  Negative for myalgias and arthralgias.   Skin:  Positive for itching and rash.   Psychiatric/Behavioral:  Positive for high stress.       Objective:    Physical Exam   Constitutional: She appears well-developed and well-nourished.   Eyes: No conjunctival no injection.   Neurological: She is alert and oriented to person, place, and time.   Psychiatric: She has a normal mood and affect.   Skin:               Diagram Legend     Erythematous scaling macule/papule c/w actinic keratosis       Vascular papule c/w angioma      Pigmented verrucoid papule/plaque c/w seborrheic keratosis      Yellow umbilicated papule c/w sebaceous hyperplasia      Irregularly shaped tan macule c/w lentigo     1-2 mm smooth white papules consistent with Milia      Movable subcutaneous cyst with punctum c/w epidermal inclusion cyst      Subcutaneous movable cyst c/w pilar cyst      Firm pink to brown papule c/w dermatofibroma      Pedunculated fleshy papule(s) c/w skin tag(s)      Evenly pigmented macule c/w junctional nevus     Mildly variegated pigmented, slightly irregular-bordered macule c/w mildly atypical nevus      Flesh colored to evenly pigmented papule c/w intradermal nevus       Pink pearly papule/plaque c/w basal cell carcinoma      Erythematous hyperkeratotic cursted plaque c/w SCC      Surgical scar with no sign of skin cancer  recurrence      Open and closed comedones      Inflammatory papules and pustules      Verrucoid papule consistent consistent with wart     Erythematous eczematous patches and plaques     Dystrophic onycholytic nail with subungual debris c/w onychomycosis     Umbilicated papule    Erythematous-base heme-crusted tan verrucoid plaque consistent with inflamed seborrheic keratosis     Erythematous Silvery Scaling Plaque c/w Psoriasis     See annotation      Assessment / Plan:        Itching  -     fluocinonide 0.05% (LIDEX) 0.05 % cream; Apply topically 2 (two) times daily. Prn itch.  Dispense: 60 g; Refill: 0  Reviewed with patient different treatment options and associated risks.    Papular rash  -     Ambulatory referral/consult to Dermatology  Previous [facility] labs and or records and notes reviewed and considered for their impact on our clinical decision making today.    Encounter for skin care  No hot water bathing reviewed.    Hives  Discussed with patient the etiology and pathogenesis of the disease or skin lesion(s) and possible treatments and aggravators.    Chronic nature of this condition discussed with patient.  Reviewed with patient different treatment options and associated risks.  Can take Claritin or Zyrtec 4-5 x day as needed.  Warned of dry mouth, difficulty urinating, hyperactivity, blurry vision, and new studies regarding dementia.  Okay to take additional Benadryl at night if unable to sleep due to itch.  Patient to consider taking over the counter stinging nettle leaf herbal supplement, preferably organic.  Consider xolair later?  No epipen needed right now due to lack of symptoms.    Dieting  Diet with increased fiber, avoidance of sugars, avoidance of dairy, avoidance of wheat and white flour (gluten) all discussed.  Avoid caffeine and alcohol and energy drinks.  Look up Yazan Bhardwaj's anti inflammatory diet.  Don't worry about extraneous details, like avoidance of tomatoes and  mushrooms.    Stress  Reviewed that condition can be triggered and worsened by stress.  Consider stress reducing exercises or activities, like walks, physical exercise, yoga, meditation, and breathing exercises.  Patient to research such activities on line.             Follow up if symptoms worsen or fail to improve.

## 2022-12-22 NOTE — PATIENT INSTRUCTIONS
Avoid using hot water    Shower sooner than later after exercise, exertion, or sweating.  Can do wash cloth wipes if more convenient.  Sweat can cause irritation and may exacerbate skin conditions.  Use corn starch as a drying powder.     Can take Claritin or Zyrtec 4-5 x day as needed.  Warned of dry mouth, difficulty urinating, hyperactivity, blurry vision, and new studies regarding dementia.     Diet with increased fiber, avoidance of sugars, avoidance of dairy, avoidance of wheat and white flour (gluten) all discussed.  Avoid caffeine and alcohol and energy drinks.  Look up Yazan Bhardwaj's anti inflammatory diet.  Don't worry about extraneous details, like avoidance of tomatoes and mushrooms.     Good skin care regimen discussed including limiting to one bath or shower per day, using lukewarm water with mild soap and moisturization to skin once to twice daily.  Consider glycerin bar soap or Dove.  Consider organic coconut oil.

## 2023-01-05 ENCOUNTER — HOSPITAL ENCOUNTER (OUTPATIENT)
Dept: RADIOLOGY | Facility: HOSPITAL | Age: 53
Discharge: HOME | End: 2023-01-05
Attending: FAMILY MEDICINE
Payer: COMMERCIAL

## 2023-01-05 DIAGNOSIS — K21.9 GASTROESOPHAGEAL REFLUX DISEASE WITHOUT ESOPHAGITIS: ICD-10-CM

## 2023-01-05 DIAGNOSIS — Z12.31 SCREENING MAMMOGRAM FOR BREAST CANCER: ICD-10-CM

## 2023-01-05 DIAGNOSIS — R10.13 EPIGASTRIC ABDOMINAL PAIN: ICD-10-CM

## 2023-01-05 DIAGNOSIS — I10 ESSENTIAL HYPERTENSION: ICD-10-CM

## 2023-01-05 PROCEDURE — 77067 SCR MAMMO BI INCL CAD: CPT | Mod: TC,PN

## 2023-01-05 PROCEDURE — 77067 MAMMO DIGITAL SCREENING BILAT WITH TOMO: ICD-10-PCS | Mod: 26,,, | Performed by: RADIOLOGY

## 2023-01-05 PROCEDURE — 77063 BREAST TOMOSYNTHESIS BI: CPT | Mod: 26,,, | Performed by: RADIOLOGY

## 2023-01-05 PROCEDURE — 77067 SCR MAMMO BI INCL CAD: CPT | Mod: 26,,, | Performed by: RADIOLOGY

## 2023-01-05 PROCEDURE — 77063 MAMMO DIGITAL SCREENING BILAT WITH TOMO: ICD-10-PCS | Mod: 26,,, | Performed by: RADIOLOGY

## 2023-01-05 RX ORDER — PANTOPRAZOLE SODIUM 40 MG/1
40 TABLET, DELAYED RELEASE ORAL DAILY
Qty: 90 TABLET | Refills: 0 | Status: SHIPPED | OUTPATIENT
Start: 2023-01-05 | End: 2023-01-23 | Stop reason: DRUGHIGH

## 2023-01-05 RX ORDER — AMLODIPINE BESYLATE 10 MG/1
10 TABLET ORAL DAILY
Qty: 90 TABLET | Refills: 0 | Status: SHIPPED | OUTPATIENT
Start: 2023-01-05 | End: 2023-01-23 | Stop reason: SDUPTHER

## 2023-01-06 RX ORDER — SUCRALFATE 1 G/1
1 TABLET ORAL 3 TIMES DAILY PRN
Qty: 90 TABLET | Refills: 0 | Status: SHIPPED | OUTPATIENT
Start: 2023-01-06 | End: 2023-11-21

## 2023-01-09 ENCOUNTER — PATIENT OUTREACH (OUTPATIENT)
Dept: ADMINISTRATIVE | Facility: HOSPITAL | Age: 53
End: 2023-01-09
Payer: COMMERCIAL

## 2023-01-09 ENCOUNTER — CLINICAL SUPPORT (OUTPATIENT)
Dept: GASTROENTEROLOGY | Facility: CLINIC | Age: 53
End: 2023-01-09
Payer: COMMERCIAL

## 2023-01-09 DIAGNOSIS — K85.00 IDIOPATHIC ACUTE PANCREATITIS WITHOUT INFECTION OR NECROSIS: Primary | ICD-10-CM

## 2023-01-09 DIAGNOSIS — E11.9 TYPE 2 DIABETES MELLITUS WITHOUT COMPLICATION, WITHOUT LONG-TERM CURRENT USE OF INSULIN: ICD-10-CM

## 2023-01-09 NOTE — PROGRESS NOTES
Health Maintenance Due   Topic Date Due    Pneumococcal Vaccines (Age 0-64) (1 - PCV) Never done    Shingles Vaccine (1 of 2) Never done    COVID-19 Vaccine (4 - Booster for Pfizer series) 02/04/2022    Eye Exam  03/11/2022    Influenza Vaccine (1) 09/01/2022

## 2023-01-23 ENCOUNTER — LAB VISIT (OUTPATIENT)
Dept: LAB | Facility: HOSPITAL | Age: 53
End: 2023-01-23
Attending: INTERNAL MEDICINE
Payer: COMMERCIAL

## 2023-01-23 ENCOUNTER — OFFICE VISIT (OUTPATIENT)
Dept: PRIMARY CARE CLINIC | Facility: CLINIC | Age: 53
End: 2023-01-23
Payer: COMMERCIAL

## 2023-01-23 VITALS
WEIGHT: 160.88 LBS | HEART RATE: 64 BPM | DIASTOLIC BLOOD PRESSURE: 78 MMHG | BODY MASS INDEX: 28.5 KG/M2 | TEMPERATURE: 99 F | SYSTOLIC BLOOD PRESSURE: 122 MMHG | OXYGEN SATURATION: 99 % | HEIGHT: 63 IN

## 2023-01-23 DIAGNOSIS — I10 PRIMARY HYPERTENSION: ICD-10-CM

## 2023-01-23 DIAGNOSIS — K21.9 GASTROESOPHAGEAL REFLUX DISEASE WITHOUT ESOPHAGITIS: ICD-10-CM

## 2023-01-23 DIAGNOSIS — E11.9 TYPE 2 DIABETES MELLITUS WITHOUT COMPLICATION, WITHOUT LONG-TERM CURRENT USE OF INSULIN: ICD-10-CM

## 2023-01-23 DIAGNOSIS — Z11.3 SCREENING FOR STDS (SEXUALLY TRANSMITTED DISEASES): ICD-10-CM

## 2023-01-23 DIAGNOSIS — I10 PRIMARY HYPERTENSION: Primary | ICD-10-CM

## 2023-01-23 DIAGNOSIS — I10 ESSENTIAL HYPERTENSION: ICD-10-CM

## 2023-01-23 DIAGNOSIS — F51.04 PSYCHOPHYSIOLOGICAL INSOMNIA: ICD-10-CM

## 2023-01-23 DIAGNOSIS — K21.9 GASTROESOPHAGEAL REFLUX DISEASE, UNSPECIFIED WHETHER ESOPHAGITIS PRESENT: ICD-10-CM

## 2023-01-23 DIAGNOSIS — F43.23 ADJUSTMENT DISORDER WITH MIXED ANXIETY AND DEPRESSED MOOD: ICD-10-CM

## 2023-01-23 DIAGNOSIS — E11.69 HYPERLIPIDEMIA ASSOCIATED WITH TYPE 2 DIABETES MELLITUS: ICD-10-CM

## 2023-01-23 DIAGNOSIS — N95.1 MENOPAUSAL SYMPTOMS: ICD-10-CM

## 2023-01-23 DIAGNOSIS — E78.5 HYPERLIPIDEMIA ASSOCIATED WITH TYPE 2 DIABETES MELLITUS: ICD-10-CM

## 2023-01-23 LAB
ALBUMIN SERPL BCP-MCNC: 4.4 G/DL (ref 3.5–5.2)
ALBUMIN/CREAT UR: 8.9 UG/MG (ref 0–30)
ALP SERPL-CCNC: 153 U/L (ref 55–135)
ALT SERPL W/O P-5'-P-CCNC: 24 U/L (ref 10–44)
ANION GAP SERPL CALC-SCNC: 9 MMOL/L (ref 8–16)
AST SERPL-CCNC: 22 U/L (ref 10–40)
BILIRUB SERPL-MCNC: 0.5 MG/DL (ref 0.1–1)
BUN SERPL-MCNC: 7 MG/DL (ref 6–20)
CALCIUM SERPL-MCNC: 10.4 MG/DL (ref 8.7–10.5)
CHLORIDE SERPL-SCNC: 102 MMOL/L (ref 95–110)
CO2 SERPL-SCNC: 29 MMOL/L (ref 23–29)
CREAT SERPL-MCNC: 0.8 MG/DL (ref 0.5–1.4)
CREAT UR-MCNC: 90 MG/DL (ref 15–325)
EST. GFR  (NO RACE VARIABLE): >60 ML/MIN/1.73 M^2
ESTIMATED AVG GLUCOSE: 137 MG/DL (ref 68–131)
GLUCOSE SERPL-MCNC: 112 MG/DL (ref 70–110)
HBA1C MFR BLD: 6.4 % (ref 4–5.6)
MICROALBUMIN UR DL<=1MG/L-MCNC: 8 UG/ML
POTASSIUM SERPL-SCNC: 3.9 MMOL/L (ref 3.5–5.1)
PROT SERPL-MCNC: 8 G/DL (ref 6–8.4)
SODIUM SERPL-SCNC: 140 MMOL/L (ref 136–145)

## 2023-01-23 PROCEDURE — 3074F SYST BP LT 130 MM HG: CPT | Mod: CPTII,S$GLB,, | Performed by: INTERNAL MEDICINE

## 2023-01-23 PROCEDURE — 99214 OFFICE O/P EST MOD 30 MIN: CPT | Mod: S$GLB,,, | Performed by: INTERNAL MEDICINE

## 2023-01-23 PROCEDURE — 3066F NEPHROPATHY DOC TX: CPT | Mod: CPTII,S$GLB,, | Performed by: INTERNAL MEDICINE

## 2023-01-23 PROCEDURE — 1160F RVW MEDS BY RX/DR IN RCRD: CPT | Mod: CPTII,S$GLB,, | Performed by: INTERNAL MEDICINE

## 2023-01-23 PROCEDURE — 1159F PR MEDICATION LIST DOCUMENTED IN MEDICAL RECORD: ICD-10-PCS | Mod: CPTII,S$GLB,, | Performed by: INTERNAL MEDICINE

## 2023-01-23 PROCEDURE — 83036 HEMOGLOBIN GLYCOSYLATED A1C: CPT | Performed by: INTERNAL MEDICINE

## 2023-01-23 PROCEDURE — 99999 PR PBB SHADOW E&M-EST. PATIENT-LVL V: CPT | Mod: PBBFAC,,, | Performed by: INTERNAL MEDICINE

## 2023-01-23 PROCEDURE — 3078F PR MOST RECENT DIASTOLIC BLOOD PRESSURE < 80 MM HG: ICD-10-PCS | Mod: CPTII,S$GLB,, | Performed by: INTERNAL MEDICINE

## 2023-01-23 PROCEDURE — 87340 HEPATITIS B SURFACE AG IA: CPT | Performed by: INTERNAL MEDICINE

## 2023-01-23 PROCEDURE — 3044F HG A1C LEVEL LT 7.0%: CPT | Mod: CPTII,S$GLB,, | Performed by: INTERNAL MEDICINE

## 2023-01-23 PROCEDURE — 1160F PR REVIEW ALL MEDS BY PRESCRIBER/CLIN PHARMACIST DOCUMENTED: ICD-10-PCS | Mod: CPTII,S$GLB,, | Performed by: INTERNAL MEDICINE

## 2023-01-23 PROCEDURE — 3078F DIAST BP <80 MM HG: CPT | Mod: CPTII,S$GLB,, | Performed by: INTERNAL MEDICINE

## 2023-01-23 PROCEDURE — 1159F MED LIST DOCD IN RCRD: CPT | Mod: CPTII,S$GLB,, | Performed by: INTERNAL MEDICINE

## 2023-01-23 PROCEDURE — 3074F PR MOST RECENT SYSTOLIC BLOOD PRESSURE < 130 MM HG: ICD-10-PCS | Mod: CPTII,S$GLB,, | Performed by: INTERNAL MEDICINE

## 2023-01-23 PROCEDURE — 86592 SYPHILIS TEST NON-TREP QUAL: CPT | Performed by: INTERNAL MEDICINE

## 2023-01-23 PROCEDURE — 3044F PR MOST RECENT HEMOGLOBIN A1C LEVEL <7.0%: ICD-10-PCS | Mod: CPTII,S$GLB,, | Performed by: INTERNAL MEDICINE

## 2023-01-23 PROCEDURE — 3008F BODY MASS INDEX DOCD: CPT | Mod: CPTII,S$GLB,, | Performed by: INTERNAL MEDICINE

## 2023-01-23 PROCEDURE — 3061F PR NEG MICROALBUMINURIA RESULT DOCUMENTED/REVIEW: ICD-10-PCS | Mod: CPTII,S$GLB,, | Performed by: INTERNAL MEDICINE

## 2023-01-23 PROCEDURE — 80053 COMPREHEN METABOLIC PANEL: CPT | Performed by: INTERNAL MEDICINE

## 2023-01-23 PROCEDURE — 3008F PR BODY MASS INDEX (BMI) DOCUMENTED: ICD-10-PCS | Mod: CPTII,S$GLB,, | Performed by: INTERNAL MEDICINE

## 2023-01-23 PROCEDURE — 36415 COLL VENOUS BLD VENIPUNCTURE: CPT | Mod: PN | Performed by: INTERNAL MEDICINE

## 2023-01-23 PROCEDURE — 99214 PR OFFICE/OUTPT VISIT, EST, LEVL IV, 30-39 MIN: ICD-10-PCS | Mod: S$GLB,,, | Performed by: INTERNAL MEDICINE

## 2023-01-23 PROCEDURE — 3066F PR DOCUMENTATION OF TREATMENT FOR NEPHROPATHY: ICD-10-PCS | Mod: CPTII,S$GLB,, | Performed by: INTERNAL MEDICINE

## 2023-01-23 PROCEDURE — 82570 ASSAY OF URINE CREATININE: CPT | Performed by: INTERNAL MEDICINE

## 2023-01-23 PROCEDURE — 87389 HIV-1 AG W/HIV-1&-2 AB AG IA: CPT | Performed by: INTERNAL MEDICINE

## 2023-01-23 PROCEDURE — 3061F NEG MICROALBUMINURIA REV: CPT | Mod: CPTII,S$GLB,, | Performed by: INTERNAL MEDICINE

## 2023-01-23 PROCEDURE — 99999 PR PBB SHADOW E&M-EST. PATIENT-LVL V: ICD-10-PCS | Mod: PBBFAC,,, | Performed by: INTERNAL MEDICINE

## 2023-01-23 RX ORDER — TRAMADOL HYDROCHLORIDE 50 MG/1
50 TABLET ORAL EVERY 6 HOURS PRN
COMMUNITY
Start: 2022-11-08 | End: 2023-01-23

## 2023-01-23 RX ORDER — PANTOPRAZOLE SODIUM 20 MG/1
20 TABLET, DELAYED RELEASE ORAL DAILY
Qty: 90 TABLET | Refills: 1 | Status: SHIPPED | OUTPATIENT
Start: 2023-01-23 | End: 2023-07-23

## 2023-01-23 RX ORDER — METFORMIN HYDROCHLORIDE 500 MG/1
500 TABLET, EXTENDED RELEASE ORAL 2 TIMES DAILY
Qty: 180 TABLET | Refills: 1 | Status: SHIPPED | OUTPATIENT
Start: 2023-01-23 | End: 2023-04-25 | Stop reason: ALTCHOICE

## 2023-01-23 RX ORDER — ZOLPIDEM TARTRATE 5 MG/1
5 TABLET ORAL NIGHTLY PRN
Qty: 20 TABLET | Refills: 0 | Status: SHIPPED | OUTPATIENT
Start: 2023-01-23 | End: 2023-04-13 | Stop reason: SDUPTHER

## 2023-01-23 RX ORDER — AMLODIPINE BESYLATE 10 MG/1
10 TABLET ORAL DAILY
Qty: 90 TABLET | Refills: 1 | Status: SHIPPED | OUTPATIENT
Start: 2023-01-23 | End: 2023-08-07

## 2023-01-23 RX ORDER — SERTRALINE HYDROCHLORIDE 25 MG/1
25 TABLET, FILM COATED ORAL DAILY
Qty: 30 TABLET | Refills: 3 | Status: SHIPPED | OUTPATIENT
Start: 2023-01-23 | End: 2023-04-25 | Stop reason: SINTOL

## 2023-01-24 ENCOUNTER — PATIENT MESSAGE (OUTPATIENT)
Dept: PRIMARY CARE CLINIC | Facility: CLINIC | Age: 53
End: 2023-01-24
Payer: COMMERCIAL

## 2023-01-24 LAB
HBV SURFACE AG SERPL QL IA: NORMAL
HIV 1+2 AB+HIV1 P24 AG SERPL QL IA: NORMAL
RPR SER QL: NORMAL

## 2023-01-25 ENCOUNTER — PATIENT MESSAGE (OUTPATIENT)
Dept: BEHAVIORAL HEALTH | Facility: CLINIC | Age: 53
End: 2023-01-25
Payer: COMMERCIAL

## 2023-01-25 ENCOUNTER — PATIENT MESSAGE (OUTPATIENT)
Dept: PRIMARY CARE CLINIC | Facility: CLINIC | Age: 53
End: 2023-01-25
Payer: COMMERCIAL

## 2023-01-25 ENCOUNTER — TELEPHONE (OUTPATIENT)
Dept: BEHAVIORAL HEALTH | Facility: CLINIC | Age: 53
End: 2023-01-25
Payer: COMMERCIAL

## 2023-01-25 NOTE — PROGRESS NOTES
Contacted patient no answer, busy signal x 2. Unable to LVM. Sent pt portal message to pls call me.

## 2023-01-27 ENCOUNTER — PATIENT MESSAGE (OUTPATIENT)
Dept: BEHAVIORAL HEALTH | Facility: CLINIC | Age: 53
End: 2023-01-27
Payer: COMMERCIAL

## 2023-01-27 ENCOUNTER — TELEPHONE (OUTPATIENT)
Dept: BEHAVIORAL HEALTH | Facility: CLINIC | Age: 53
End: 2023-01-27
Payer: COMMERCIAL

## 2023-01-27 NOTE — PROGRESS NOTES
CHW reached out to pt, line is busy when called, unable to LVM.  Sent patient portal message to please call.

## 2023-01-30 ENCOUNTER — PATIENT MESSAGE (OUTPATIENT)
Dept: BEHAVIORAL HEALTH | Facility: CLINIC | Age: 53
End: 2023-01-30
Payer: COMMERCIAL

## 2023-01-30 ENCOUNTER — TELEPHONE (OUTPATIENT)
Dept: BEHAVIORAL HEALTH | Facility: CLINIC | Age: 53
End: 2023-01-30
Payer: COMMERCIAL

## 2023-01-30 NOTE — PROGRESS NOTES
Contacted patient no answer left VM.  Sent patient portal message to pls call or have PCP refer in the future.

## 2023-02-07 ENCOUNTER — OFFICE VISIT (OUTPATIENT)
Dept: URGENT CARE | Facility: CLINIC | Age: 53
End: 2023-02-07
Payer: COMMERCIAL

## 2023-02-07 VITALS
HEART RATE: 70 BPM | RESPIRATION RATE: 18 BRPM | DIASTOLIC BLOOD PRESSURE: 81 MMHG | BODY MASS INDEX: 27.82 KG/M2 | WEIGHT: 157 LBS | TEMPERATURE: 98 F | HEIGHT: 63 IN | OXYGEN SATURATION: 99 % | SYSTOLIC BLOOD PRESSURE: 183 MMHG

## 2023-02-07 DIAGNOSIS — R07.81 RIB PAIN: Primary | ICD-10-CM

## 2023-02-07 DIAGNOSIS — S29.8XXA BLUNT TRAUMA TO CHEST, INITIAL ENCOUNTER: ICD-10-CM

## 2023-02-07 PROCEDURE — 3061F PR NEG MICROALBUMINURIA RESULT DOCUMENTED/REVIEW: ICD-10-PCS | Mod: CPTII,S$GLB,, | Performed by: FAMILY MEDICINE

## 2023-02-07 PROCEDURE — 3008F BODY MASS INDEX DOCD: CPT | Mod: CPTII,S$GLB,, | Performed by: FAMILY MEDICINE

## 2023-02-07 PROCEDURE — 3079F DIAST BP 80-89 MM HG: CPT | Mod: CPTII,S$GLB,, | Performed by: FAMILY MEDICINE

## 2023-02-07 PROCEDURE — 1159F MED LIST DOCD IN RCRD: CPT | Mod: CPTII,S$GLB,, | Performed by: FAMILY MEDICINE

## 2023-02-07 PROCEDURE — 3077F SYST BP >= 140 MM HG: CPT | Mod: CPTII,S$GLB,, | Performed by: FAMILY MEDICINE

## 2023-02-07 PROCEDURE — 3008F PR BODY MASS INDEX (BMI) DOCUMENTED: ICD-10-PCS | Mod: CPTII,S$GLB,, | Performed by: FAMILY MEDICINE

## 2023-02-07 PROCEDURE — 3044F HG A1C LEVEL LT 7.0%: CPT | Mod: CPTII,S$GLB,, | Performed by: FAMILY MEDICINE

## 2023-02-07 PROCEDURE — 3044F PR MOST RECENT HEMOGLOBIN A1C LEVEL <7.0%: ICD-10-PCS | Mod: CPTII,S$GLB,, | Performed by: FAMILY MEDICINE

## 2023-02-07 PROCEDURE — 99204 PR OFFICE/OUTPT VISIT, NEW, LEVL IV, 45-59 MIN: ICD-10-PCS | Mod: S$GLB,,, | Performed by: FAMILY MEDICINE

## 2023-02-07 PROCEDURE — 3061F NEG MICROALBUMINURIA REV: CPT | Mod: CPTII,S$GLB,, | Performed by: FAMILY MEDICINE

## 2023-02-07 PROCEDURE — 1160F RVW MEDS BY RX/DR IN RCRD: CPT | Mod: CPTII,S$GLB,, | Performed by: FAMILY MEDICINE

## 2023-02-07 PROCEDURE — 3066F PR DOCUMENTATION OF TREATMENT FOR NEPHROPATHY: ICD-10-PCS | Mod: CPTII,S$GLB,, | Performed by: FAMILY MEDICINE

## 2023-02-07 PROCEDURE — 3066F NEPHROPATHY DOC TX: CPT | Mod: CPTII,S$GLB,, | Performed by: FAMILY MEDICINE

## 2023-02-07 PROCEDURE — 71100 X-RAY EXAM RIBS UNI 2 VIEWS: CPT | Mod: LT,S$GLB,, | Performed by: RADIOLOGY

## 2023-02-07 PROCEDURE — 1160F PR REVIEW ALL MEDS BY PRESCRIBER/CLIN PHARMACIST DOCUMENTED: ICD-10-PCS | Mod: CPTII,S$GLB,, | Performed by: FAMILY MEDICINE

## 2023-02-07 PROCEDURE — 3077F PR MOST RECENT SYSTOLIC BLOOD PRESSURE >= 140 MM HG: ICD-10-PCS | Mod: CPTII,S$GLB,, | Performed by: FAMILY MEDICINE

## 2023-02-07 PROCEDURE — 3079F PR MOST RECENT DIASTOLIC BLOOD PRESSURE 80-89 MM HG: ICD-10-PCS | Mod: CPTII,S$GLB,, | Performed by: FAMILY MEDICINE

## 2023-02-07 PROCEDURE — 1159F PR MEDICATION LIST DOCUMENTED IN MEDICAL RECORD: ICD-10-PCS | Mod: CPTII,S$GLB,, | Performed by: FAMILY MEDICINE

## 2023-02-07 PROCEDURE — 99204 OFFICE O/P NEW MOD 45 MIN: CPT | Mod: S$GLB,,, | Performed by: FAMILY MEDICINE

## 2023-02-07 PROCEDURE — 71100 XR RIBS 2 VIEW LEFT: ICD-10-PCS | Mod: LT,S$GLB,, | Performed by: RADIOLOGY

## 2023-02-07 RX ORDER — NAPROXEN 500 MG/1
500 TABLET ORAL 2 TIMES DAILY WITH MEALS
Qty: 30 TABLET | Refills: 0 | Status: SHIPPED | OUTPATIENT
Start: 2023-02-07 | End: 2023-11-21

## 2023-02-07 NOTE — LETTER
February 7, 2023      [facility] [address] [address]  Phone: [phone]  Fax: [phone]       Patient: Joan Rizvi   YOB: 1970  Date of Visit: 02/07/2023    To Whom It May Concern:    Aidan Rizvi  was at [facility] on 02/07/2023. The patient may return to work/school on 02/09/2023 with no restrictions. If you have any questions or concerns, or if I can be of further assistance, please do not hesitate to contact me.    Sincerely,    Trey Caldera MD

## 2023-02-07 NOTE — PROGRESS NOTES
"Subjective:       Patient ID: Joan Rizvi is a 52 y.o. female.    Vitals:  height is 5' 3" (1.6 m) and weight is 71.2 kg (157 lb). Her oral temperature is 98.4 °F (36.9 °C). Her blood pressure is 183/81 (abnormal) and her pulse is 70. Her respiration is 18 and oxygen saturation is 99%.     Chief Complaint: Fall    T states she fell 5 days ago hitting left side.    Fall  The accident occurred 5 to 7 days ago. The fall occurred while standing. She fell from a height of 3 to 5 ft. Impact surface: side of bath tub. Point of impact: left side of rib cage. Pain location: left ribs. The pain is at a severity of 8/10. The pain is moderate. The symptoms are aggravated by ambulation, flexion, standing and pressure on injury. Pertinent negatives include no abdominal pain, bowel incontinence, fever, headaches, hearing loss, hematuria, loss of consciousness, nausea, numbness, tingling, visual change or vomiting. She has tried ice, heat and NSAID (pain ointment) for the symptoms. The treatment provided no relief.     Constitution: Negative for fever.   Gastrointestinal:  Negative for abdominal pain, nausea, vomiting and bowel incontinence.   Genitourinary:  Negative for hematuria.   Neurological:  Negative for headaches, loss of consciousness and numbness.     Objective:      Physical Exam   Constitutional: She is oriented to person, place, and time. She appears well-developed. She is cooperative.  Non-toxic appearance. She does not appear ill. No distress.   HENT:   Head: Normocephalic and atraumatic.   Ears:   Right Ear: Hearing, tympanic membrane, external ear and ear canal normal.   Left Ear: Hearing, tympanic membrane, external ear and ear canal normal.   Nose: Nose normal. No mucosal edema, rhinorrhea or nasal deformity. No epistaxis. Right sinus exhibits no maxillary sinus tenderness and no frontal sinus tenderness. Left sinus exhibits no maxillary sinus tenderness and no frontal sinus tenderness.   Mouth/Throat: " Uvula is midline, oropharynx is clear and moist and mucous membranes are normal. No trismus in the jaw. Normal dentition. No uvula swelling. No posterior oropharyngeal erythema.   Eyes: Conjunctivae and lids are normal. Right eye exhibits no discharge. Left eye exhibits no discharge. No scleral icterus.   Neck: Trachea normal and phonation normal. Neck supple.   Cardiovascular: Normal rate, regular rhythm, normal heart sounds and normal pulses.   Pulmonary/Chest: Effort normal and breath sounds normal. No respiratory distress.   Abdominal: Normal appearance and bowel sounds are normal. She exhibits no distension and no mass. Soft. There is no abdominal tenderness.   Musculoskeletal: Normal range of motion.         General: No deformity. Normal range of motion.   Neurological: She is alert and oriented to person, place, and time. She exhibits normal muscle tone. Coordination normal.   Skin: Skin is warm, dry, intact, not diaphoretic and not pale.   Psychiatric: Her speech is normal and behavior is normal. Judgment and thought content normal.   Nursing note and vitals reviewed.    Radiology Procedure Done: AP & Lat CXR.  Interpretation: No focal consolidations, no effusions or pneumothorax. Rib contours intact no sign of fracture    X-Ray Ribs 2 View Left    Result Date: 2/7/2023  EXAMINATION: XR RIBS 2 VIEW LEFT CLINICAL HISTORY: Pain, unspecified TECHNIQUE: Six views of the left ribs were performed. COMPARISON: Chest radiograph 05/22/2019 and CT abdomen and pelvis 10/05/2022 FINDINGS: Suspected 2 BB palpation markers projected over the lower left chest.  No subcutaneous emphysema. Slight levocurvature of the thoracic spine.  No displaced left rib fracture identified.  No dislocation or destructive osseous process.  Age-related minimal to mild degenerative change of the thoracic spine and left shoulder.  Left lung is well expanded and clear without pneumothorax or pleural effusion.  Cholecystectomy clips noted.   Imaged upper abdomen is otherwise within normal limits.     No displaced left rib fracture identified. Electronically signed by: Huber Lion MD Date:    02/07/2023 Time:    14:05          Assessment:       1. Pain    2. Blunt trauma to chest, initial encounter          Plan:         Rib pain  -     X-Ray Ribs 2 View Left; Future; Expected date: 02/07/2023  -     naproxen (NAPROSYN) 500 MG tablet; Take 1 tablet (500 mg total) by mouth 2 (two) times daily with meals.  Dispense: 30 tablet; Refill: 0    Blunt trauma to chest, initial encounter  -     X-Ray Ribs 2 View Left; Future; Expected date: 02/07/2023  -     naproxen (NAPROSYN) 500 MG tablet; Take 1 tablet (500 mg total) by mouth 2 (two) times daily with meals.  Dispense: 30 tablet; Refill: 0

## 2023-02-20 NOTE — PROGRESS NOTES
"Subjective:       Patient ID: Joan Wetzel is a 52 y.o. female.    Chief Complaint: Establish Care    Last seen by previous PCP here 10 months ago for annual physical, that doctor has left the practice. She presents for transfer of care complaining of depression and anxiety. Multiple deaths in past five years include her , both parents and a friend. Was treated in the past with Effexor and Buspar. Having panic symptoms when driving on interstate or over bridges. Not able to sleep, gets about three hours a night. Requesting treatment.     No home glucose log for review.    She requests STD screening.    PMH: includes HTN, DM2, Chol, GERD, Vit D insuff, Pancreatitis.   PSH: reviewed.   Pap normal 11/20. Mammogram normal 1/23. Colonoscopy 3/21 - diverticulosis.   Social: former tobacco use, alcohol 1 a week.  , 3 adult children, lives alone.  of an apartment complex.  FMH: HTN, DM, Stroke, Heart dis, Stomach cancer, Lung cancer, Alcohol abuse, Dementia.  Allergies: HCTZ, pineapple.   Medications: list reviewed and reconciled.     Review of Systems   Constitutional:  Positive for fatigue. Negative for fever.   Respiratory:  Negative for cough and shortness of breath.    Cardiovascular:  Negative for chest pain, palpitations and leg swelling.   Gastrointestinal:  Negative for abdominal pain, diarrhea, nausea and vomiting.   Neurological:  Negative for dizziness, syncope, weakness and headaches.   Psychiatric/Behavioral:  Positive for dysphoric mood and sleep disturbance. Negative for agitation and suicidal ideas. The patient is nervous/anxious.        Objective:      Vitals:    01/23/23 1447   BP: 122/78   Pulse: 64   Temp: 98.5 °F (36.9 °C)   SpO2: 99%   Weight: 73 kg (160 lb 14.4 oz)   Height: 5' 3" (1.6 m)     Physical Exam  Constitutional:       General: She is not in acute distress.     Appearance: She is not ill-appearing.   Cardiovascular:      Rate and Rhythm: Normal rate " and regular rhythm.   Pulmonary:      Effort: Pulmonary effort is normal. No respiratory distress.      Breath sounds: Normal breath sounds. No wheezing or rales.   Musculoskeletal:         General: Normal range of motion.      Right lower leg: No edema.      Left lower leg: No edema.   Skin:     General: Skin is warm and dry.      Findings: No rash.   Neurological:      Mental Status: She is alert.      Cranial Nerves: No cranial nerve deficit.      Coordination: Coordination normal.      Gait: Gait normal.   Psychiatric:         Mood and Affect: Affect normal. Mood is depressed.         Speech: Speech normal.         Behavior: Behavior normal.         Thought Content: Thought content normal.       Assessment:       Problem List Items Addressed This Visit       Essential hypertension    Relevant Medications    amLODIPine (NORVASC) 10 MG tablet    Gastroesophageal reflux disease without esophagitis    Relevant Medications    pantoprazole (PROTONIX) 20 MG tablet    Type 2 diabetes mellitus, without long-term current use of insulin    Relevant Medications    metFORMIN (GLUCOPHAGE-XR) 500 MG ER 24hr tablet    Other Relevant Orders    Hemoglobin A1C (Completed)    Microalbumin/Creatinine Ratio, Urine (Completed)    Hyperlipidemia associated with type 2 diabetes mellitus    Relevant Medications    metFORMIN (GLUCOPHAGE-XR) 500 MG ER 24hr tablet     Other Visit Diagnoses       Primary hypertension    -  Primary    Relevant Orders    Comprehensive Metabolic Panel (Completed)    Gastroesophageal reflux disease, unspecified whether esophagitis present        Menopausal symptoms        Adjustment disorder with mixed anxiety and depressed mood        Relevant Medications    sertraline (ZOLOFT) 25 MG tablet    Other Relevant Orders    Ambulatory referral/consult to Primary Care Behavioral Health (Non-Opioids)    Psychophysiological insomnia        Relevant Medications    zolpidem (AMBIEN) 5 MG Tab    Screening for STDs  (sexually transmitted diseases)        Relevant Orders    HIV 1/2 Ag/Ab (4th Gen) (Completed)    RPR (Completed)    Hepatitis B Surface Antigen (Completed)              Plan:       Primary hypertension controlled  -     Comprehensive Metabolic Panel; Future; Expected date: 01/23/2023  -     continue Amlodipine.    Type 2 diabetes mellitus without complication, without long-term current use of insulin  -     Hemoglobin A1C; Future; Expected date: 01/23/2023  -     Microalbumin/Creatinine Ratio, Urine  -     metFORMIN (GLUCOPHAGE-XR) 500 MG ER 24hr tablet; Take 1 tablet (500 mg total) by mouth 2 (two) times a day.  Dispense: 180 tablet; Refill: 1    Hyperlipidemia associated with type 2 diabetes mellitus        -    continue Atorvastatin.    Gastroesophageal reflux disease, unspecified whether esophagitis present        -    continue Pantoprazole daily, Sucralfate prn.     Menopausal symptoms    Adjustment disorder with mixed anxiety and depressed mood  -     Start Sertraline (ZOLOFT) 25 MG tablet; Take 1 tablet (25 mg total) by mouth once daily.  Dispense: 30 tablet; Refill: 3  -     Ambulatory referral/consult to Primary Care Behavioral Health (Non-Opioids); Future; Expected date: 01/30/2023    Psychophysiological insomnia  -     zolpidem (AMBIEN) 5 MG Tab; Take 1 tablet (5 mg total) by mouth nightly as needed (Insomnia.).  Dispense: 20 tablet; Refill: 0    Screening for STDs (sexually transmitted diseases)  -     HIV 1/2 Ag/Ab (4th Gen); Future; Expected date: 01/23/2023  -     RPR; Future; Expected date: 01/23/2023  -     Hepatitis B Surface Antigen; Future; Expected date: 01/23/2023    Essential hypertension  -     amLODIPine (NORVASC) 10 MG tablet; Take 1 tablet (10 mg total) by mouth once daily.  Dispense: 90 tablet; Refill: 1    Gastroesophageal reflux disease without esophagitis  -     pantoprazole (PROTONIX) 20 MG tablet; Take 1 tablet (20 mg total) by mouth once daily.  Dispense: 90 tablet; Refill: 1

## 2023-03-13 ENCOUNTER — PATIENT MESSAGE (OUTPATIENT)
Dept: ADMINISTRATIVE | Facility: HOSPITAL | Age: 53
End: 2023-03-13
Payer: COMMERCIAL

## 2023-03-13 ENCOUNTER — PATIENT OUTREACH (OUTPATIENT)
Dept: ADMINISTRATIVE | Facility: HOSPITAL | Age: 53
End: 2023-03-13
Payer: COMMERCIAL

## 2023-04-03 ENCOUNTER — PATIENT MESSAGE (OUTPATIENT)
Dept: ADMINISTRATIVE | Facility: HOSPITAL | Age: 53
End: 2023-04-03
Payer: COMMERCIAL

## 2023-04-13 DIAGNOSIS — E55.9 VITAMIN D DEFICIENCY: ICD-10-CM

## 2023-04-13 DIAGNOSIS — R10.13 EPIGASTRIC ABDOMINAL PAIN: ICD-10-CM

## 2023-04-13 RX ORDER — ERGOCALCIFEROL 1.25 MG/1
50000 CAPSULE ORAL
Qty: 12 CAPSULE | Refills: 3 | Status: CANCELLED | OUTPATIENT
Start: 2023-04-13

## 2023-04-14 RX ORDER — SUCRALFATE 1 G/1
1 TABLET ORAL 3 TIMES DAILY PRN
Qty: 90 TABLET | Refills: 0 | OUTPATIENT
Start: 2023-04-14

## 2023-04-17 ENCOUNTER — PATIENT OUTREACH (OUTPATIENT)
Dept: ADMINISTRATIVE | Facility: HOSPITAL | Age: 53
End: 2023-04-17
Payer: COMMERCIAL

## 2023-04-17 NOTE — PROGRESS NOTES
E-faxed [facility] for eye exam records.   [FreeTextEntry1] : 1/26/2023\par Last Hgb 9.1, Cr 2.3 (12/16/2022) \par \par \par 12/16/2022\par HGB on 10/31/2022; 8.3, \par Bone marrow biopsy: \par Patient:   MASOOD MACARIO\par \par \par Accession:                             [id number]\par \par Collected Date/Time:                   10/31/2022 16:49 EDT\par Received Date/Time:                    10/31/2022 16:50 EDT\par \par Hematopathology Addendum Report - Auth (Verified)\par \par Hematopathology Addendum\par Additional immunohistochemical stains (block 1A: p53, ) show\par  increased  positive interstitial mast cells without aggregates. TP53\par  shows less than 1% bright positive cells.\par \par There is no change in the diagnosis.\par \par Verified by: Brooklynn Erazo\par (Electronic Signature)\par Reported on: 11/17/22 09:24 EST, [facility] [place], [address]\[place]  [place] [address]\par Phone: [phone]   Fax: [phone]\par _________________________________________________________________\par \par Disclaimer\par Slide(s) with built in immunohistochemical study control(s) and negative\par  control associated with this case has/have been verified by the sign out\par  pathologist.\par \par For slide(s) without built in controls positive control slides has/have\par  been reviewed and approved by immunohistochemistry lab\par \par These immunohistochemical/ in-situ hybridization tests have been developed\par  and their performance characteristics determined by [facility], Department of Pathology, Division of Immunopathology,\par  [phone] [place] [address].  It has not been cleared\par  or approved by the U.S. Food and Drug Administration.  The FDA has\par  determined that such clearance or approval is not necessary.  This test\par  is used for clinical purposes.  The laboratory is certified under the\par  CLIA-88 as qualified to perform high complexity clinical testing.\par Hematopathology Addendum Report - Auth (Verified)\par \par Hematopathology Addendum\par       Comprehensive Hematopathology Report\par \par Final Diagnosis :\par 1, 2. Bone marrow biopsy and bone marrow aspirate\par      - Myelodysplastic syndrome with del(5q); MDS with low blasts and 5q\par  deletion\par      - Increased ring sideroblasts, increased iron stores, and SF3B1\par  mutation\par \par Diagnostic Note:\par As per chart review, the patient has a history of chronic renal\par  disease since 2016 and was noted to have macrocytic anemia 12/2016\par  with intermittent thrombocytopenia (now normal). The bone marrow\par  shows hypercellularity with erythroid hyperplasia and increased ring\par  sideroblasts and dysplastic megakaryocytosis. Interstitial mast cells\par  are increased with normal morphology, few CD25 positive, negative CD30.\par  The findings show myelodysplastic syndrome with multilineage dysplasia\par  (hypolobulated megakaryocytes) and ring sideroblasts. Correlation with\par  cytogenetics, FISH, and somatic mutation analysis to evaluate for complex\par  karyotype, del(5q), -7, del(7q) SF3B1, TP53, other abnormalities is\par  done.\par Please note findings of an   abnormal male karyotype, 46,XY,del(5)(q15q33)\par [13]/46,XY[7],  a positive MDS FISH panel and [employer] Advanced NGS\par  Myeloid Report showing   Tier I: Variants of Strong Clinical Significance:\par  SF3B1 p.Pir119Cxz (VAF: 39%),   Tier II: Variants of Potential Clinical\par \par  Significance: DNMT3A p.?  (VAF: 4%), Tier III: Variants of Unknown\par  Clinical Significance:   KIT p.Efm043Kwg  (VAF: 50%) . Based on the\par  additional findings, the MDS classification (ICC) is MDS with del(5q) and\par  with WHO is MDS with low blasts and 5q deletion.\par \par Dr. Mcmahon was notified of the diagnosis on 11/07/2022.\par \par Morphology:\par Microscopic description:\par 1. Biopsy:   Sections of bone marrow biopsy and bone marrow fragments in\par  clot show hypercellularity (50-85% cellularity), erythroid predominance\par  with maturation and increased pronormoblasts, maturing and mature\par  myeloid elements, megakaryocytes at least normal in number with abnormal\par  morphology (increased hypolobulated/small forms), increased interstitial\par  mast cells without aggregates, and iron stores increased.\par \par 2. Aspirate:   Cellular spicules are present, adequate for interpretation.\par   Maturing and mature myeloid and erythroid elements are present with\par  erythroid predominance (M:E ratio (1.16:1).  Megakaryocytes appear at\par  least normal in number with small/hypolobulated morphology. Mast cells\par  are increased in the spicules (round and normally granular).\par \par Bone Marrow Aspirate Differential: (100 Cells).\par Type  % Normal*\par Blast  0% 0-3\par Neutrophil and\par    Precursors   47% 33-63\par Eosinophil  3% 1-5\par Basophil  0% 0-1\par Pronormoblast  5% 0-2\par Normoblast  43% 15-25\par Monocyte  0% 0-2\par Lymphocyte  7% 10-15\par Plasma cell  0% 0-1\par   *Adult Range\par \par Comment\par Iron stain (examined to evaluate for iron stores; see microscopic\par  description) and Giemsa stain (shows appropriate staining pattern) are\par  performed and evaluated on block(s): 1A, 1B\par \par Ancillary Studies:\par Bone marrow aspirate iron stain:   Iron stores are increased; numerous ring\par  sideroblasts are present (greater than 15%).\par \par Flow cytometry:   The myeloid immunophenotypic findings show decreased\par  myeloid granularity, no increase in myeloid immaturity (myeloblasts,\par  0.32% of cells, with normal immunophenotype), normal myeloid antigen\par  maturation pattern, few mast cells, and the presence of hematogones\par  (which are reported to be low in myelodysplasia).\par The lymphocyte immunophenotypic findings show no diagnostic abnormalities.\par \par Immunohistochemical stains   (block 1A: CD34, , myeloperoxidase,\par  CD71, E-cadherin, factor VIII, CD15, CD61, CD25, CD30, p53) show no\par  increase in CD34 positive cells (less than 3%), erythroid predominance\par  (positive with CD71), with clusters of pronormoblasts (positive with\par  E-cadherin); diminished myeloid elements with maturation (positive\par \par  with myeloperoxidase and CD15), and increased megakaryocyte number with\par  dysplastic, small/hypolobulated morphology (positive with factor VIII,\par  CD61). CD30 is negative in mast cells; a few show dim CD25 positivity.\par \par Cytogenetics:  [id number]\par Date Collected:  10/31/2022\par Result:  Abnormal male karyotype\par Karyotype: 46,XY,del(5)(q15q33)[13]/46,XY[7]\par \par Fluorescence in situ Hybridization (FISH):    [id number]\par Result: ABNORMAL FISH MDS PANEL - 5q deletion detected (65%)\par Probe(s) and Location(s):   H5U071/ D5S23 (5p15.2), EGR1 (5q31), D7Z1\par  (7p11.1-q11.1), W7P580 (7q31), CEP-8/D8Z2   ? (8p11.1-q11.1), E99J975\par  (20q12)\par ISCN Nomenclature:  nuc clif(A7Q736/Z7P97m9,EGR1x1)[130/200],\par  (D7Z1,S8E035)x2[200], (D8Z2,S27S812)x2[197/200], (TP53x2)[197/200]\par \par Molecular Analyses:\par [employer] Advanced NGS Myeloid Report\par Specimen ID:  [id number]\par Date Collected:  10/31/2022\par Specimen Source:  Bone Marrow\par \par RESULT SUMMARY:  ABNORMAL\par DETECTED GENOMIC ALTERATIONS:\par Tier I: Variants of Strong Clinical Significance\par SF3B1 p.Kty903Slz\par Tier II: Variants of Potential Clinical Significance\par DNMT3A p.?\par Tier III: Variants of Unknown Clinical Significance\par KIT p.Gjd785Kye\par \par PERTINENT NEGATIVE RESULTS:\par The following genes are NEGATIVE for clinically relevant mutations.\par  Mutational hotspots and surrounding exonic regions were interrogated for\par  DNA level point mutations and indels (fusions not assayed).\par ABL1, ANKRD26, ASXL1, ATRX, BCOR, BCORL1, BRAF, CALR, CBL, CCND2, CDKN2A,\par  CEBPA, CSF3R, CUX1, DDX41, ETNK1, ETV6, EZH2, FBXW7, FLT3, GATA2, HRAS,\par  IDH1, IDH2, JAK2, KDM6A, KMT2A, KRAS, MAP2K1, MPL, MYD88, NF1, NPM1,\par  NRAS, PDGFRA, PHF6, PTEN, PTPN11, RUNX1, SETBP1, SRSF2, STAG2, TET2,\par  TP53, U2AF1, WT1, ZRSR2\par TECHNICAL SUMMARY :\par DNMT3A\par p.?\par c.2174-1G>A\par 4% allele frequency\par Exon 19\par [id number]\par \par SF3B1\par p.Rqt956Vkx\par c.1998G>C\par 39% allele frequency\par Exon 14\par [id number]\par \par \par KIT\par p.Bwt056Xro\par c.1879C>T\par 50% allele frequency\par Exon 12\par NM_000222.2\par \par Clinical History/Data  :\par History of macrocytic anemia with CKD rule out primary bone marrow\par  disorder\par \par CBC, 10/31/2022\par \par Test Code       Result         Reference\par                  Range\par WBC             5.75 K/uL      3.80 - 10.50\par RBC             2.34 M/uL L    4.20 - 5.80\par HGB             8.3 g/dL L     13.0 - 17.0\par HCT             25.4 % L       39.0 - 50.0\par MCV             108.5 fl H     80.0 - 100.0\par MCH             35.5 pg H      27.0 - 34.0\par MCHC            32.7 g/dL      32.0 - 36.0\par RDW             18.5 % H       10.3 - 14.5\par PLT             236 K/uL       150 - 400\par Auto NRBC       0 /100 WBCs    0 - 0\par NEUT%           63.5 %         43.0 - 77.0\par \par NEUT#           3.65 K/uL      1.80 - 7.40\par LYMPH%          20.5 %         13.0 - 44.0\par LYMPH#          1.18 K/uL      1.00 - 3.30\par MONO%           7.0 %          2.0 - 14.0\par MONO#           0.40 K/uL      0.00 - 0.90\par EOS%            3.3 %          0.0 - 6.0\par EOS#            0.19 K/uL      0.00 - 0.50\par BASO%           1.4 %          0.0 - 2.0\par BASO#           0.08 K/uL      0.00 - 0.20\par BUN             28 mg/dL H     7 - 23\par Creatinine      2.32 mg/dL H   0.50 - 1.30\par \par Verified by: Brooklynn Erazo\par (Electronic Signature)\par Reported on: 11/09/22 16:40 EST, [facility], [address]  [facility]. [address], [place] [address]\par Phone: [phone]   Fax: [phone]\par \par 10/27/2022\par (Labs on 10/18/2022): Hgb stable at 9.0, normal WBC, platelets\par Creatinine 2.32\par eGFR 28\par \par \par 5/12/2022\par Available labs reviewed. \par Macrocytic anemia, no etiology could be determined. \par

## 2023-04-17 NOTE — LETTER
AUTHORIZATION FOR RELEASE OF   CONFIDENTIAL INFORMATION    Dear [facility],    We are seeing Joan Wetzel, date of birth 1970, in the clinic at [facility]. Melissa Neely MD is the patient's PCP. Joan Wetzel has an outstanding lab/procedure at the time we reviewed her chart. In order to help keep her health information updated, she has authorized us to request the following medical record(s):        (  )  MAMMOGRAM                                      (  )  COLONOSCOPY      (  )  PAP SMEAR                                          (  )  OUTSIDE LAB RESULTS     (  )  DEXA SCAN                                          ( X )  EYE EXAM            (  )  FOOT EXAM                                          (  )  ENTIRE RECORD     (  )  OUTSIDE IMMUNIZATIONS                 (  )  _______________         Please fax records to [facility], Christie V Degrange, MD, [phone]     If you have any questions, please contact Kendra Voss at [phone].           Patient Name: Joan Wetzel  : 1970  Patient Phone #: [phone]

## 2023-04-20 DIAGNOSIS — F43.23 ADJUSTMENT DISORDER WITH MIXED ANXIETY AND DEPRESSED MOOD: ICD-10-CM

## 2023-04-20 NOTE — TELEPHONE ENCOUNTER
Refill Routing Note   Medication(s) are not appropriate for processing by [facility] for the following reason(s):      New or recently adjusted medication    [facility] action(s):  Defer            Appointments  past 12m or future 3m with PCP    Date Provider   Last Visit   1/23/2023 Melissa Neely MD   Next Visit   4/24/2023 Melissa Neely MD   ED visits in past 90 days: 0        Note composed:4:37 AM 04/20/2023

## 2023-04-20 NOTE — TELEPHONE ENCOUNTER
No new care gaps identified.  [facility] Embedded Care Gaps. Reference number: [id number]. 4/20/2023   12:10:43 AM LUCÍAT

## 2023-04-21 ENCOUNTER — PATIENT OUTREACH (OUTPATIENT)
Dept: ADMINISTRATIVE | Facility: HOSPITAL | Age: 53
End: 2023-04-21
Payer: COMMERCIAL

## 2023-04-21 RX ORDER — SERTRALINE HYDROCHLORIDE 25 MG/1
TABLET, FILM COATED ORAL
Qty: 90 TABLET | Refills: 3 | OUTPATIENT
Start: 2023-04-21

## 2023-04-21 NOTE — PROGRESS NOTES
Patient due for the following    Pneumococcal Vaccines (Age 0-64) (1 - PCV)    Shingles Vaccine (1 of 2)    COVID-19 Vaccine (4 - Booster for Pfizer series)    Influenza Vaccine (1)    Foot Exam       Received [facility] eye exam records.  Scanned to media.  updated    Immunizations: reviewed and updated  Care Everywhere: triggered  Care Teams: up to date  Outreach: none needed

## 2023-04-24 ENCOUNTER — OFFICE VISIT (OUTPATIENT)
Dept: PRIMARY CARE CLINIC | Facility: CLINIC | Age: 53
End: 2023-04-24
Payer: COMMERCIAL

## 2023-04-24 DIAGNOSIS — E11.9 TYPE 2 DIABETES MELLITUS WITHOUT COMPLICATION, WITHOUT LONG-TERM CURRENT USE OF INSULIN: ICD-10-CM

## 2023-04-24 DIAGNOSIS — F43.23 ADJUSTMENT DISORDER WITH MIXED ANXIETY AND DEPRESSED MOOD: ICD-10-CM

## 2023-04-24 PROCEDURE — 3066F NEPHROPATHY DOC TX: CPT | Mod: CPTII,95,, | Performed by: INTERNAL MEDICINE

## 2023-04-24 PROCEDURE — 3066F PR DOCUMENTATION OF TREATMENT FOR NEPHROPATHY: ICD-10-PCS | Mod: CPTII,95,, | Performed by: INTERNAL MEDICINE

## 2023-04-24 PROCEDURE — 99213 OFFICE O/P EST LOW 20 MIN: CPT | Mod: 95,,, | Performed by: INTERNAL MEDICINE

## 2023-04-24 PROCEDURE — 1159F PR MEDICATION LIST DOCUMENTED IN MEDICAL RECORD: ICD-10-PCS | Mod: CPTII,95,, | Performed by: INTERNAL MEDICINE

## 2023-04-24 PROCEDURE — 99213 PR OFFICE/OUTPT VISIT, EST, LEVL III, 20-29 MIN: ICD-10-PCS | Mod: 95,,, | Performed by: INTERNAL MEDICINE

## 2023-04-24 PROCEDURE — 3044F PR MOST RECENT HEMOGLOBIN A1C LEVEL <7.0%: ICD-10-PCS | Mod: CPTII,95,, | Performed by: INTERNAL MEDICINE

## 2023-04-24 PROCEDURE — 3061F NEG MICROALBUMINURIA REV: CPT | Mod: CPTII,95,, | Performed by: INTERNAL MEDICINE

## 2023-04-24 PROCEDURE — 3061F PR NEG MICROALBUMINURIA RESULT DOCUMENTED/REVIEW: ICD-10-PCS | Mod: CPTII,95,, | Performed by: INTERNAL MEDICINE

## 2023-04-24 PROCEDURE — 1160F PR REVIEW ALL MEDS BY PRESCRIBER/CLIN PHARMACIST DOCUMENTED: ICD-10-PCS | Mod: CPTII,95,, | Performed by: INTERNAL MEDICINE

## 2023-04-24 PROCEDURE — 1159F MED LIST DOCD IN RCRD: CPT | Mod: CPTII,95,, | Performed by: INTERNAL MEDICINE

## 2023-04-24 PROCEDURE — 1160F RVW MEDS BY RX/DR IN RCRD: CPT | Mod: CPTII,95,, | Performed by: INTERNAL MEDICINE

## 2023-04-24 PROCEDURE — 3044F HG A1C LEVEL LT 7.0%: CPT | Mod: CPTII,95,, | Performed by: INTERNAL MEDICINE

## 2023-04-25 RX ORDER — METFORMIN HYDROCHLORIDE 500 MG/1
500 TABLET ORAL 2 TIMES DAILY WITH MEALS
Qty: 180 TABLET | Refills: 1 | Status: SHIPPED | OUTPATIENT
Start: 2023-04-25 | End: 2023-11-26 | Stop reason: DRUGHIGH

## 2023-04-25 RX ORDER — ESCITALOPRAM OXALATE 5 MG/1
5 TABLET ORAL DAILY
Qty: 30 TABLET | Refills: 2 | Status: SHIPPED | OUTPATIENT
Start: 2023-04-25 | End: 2023-07-23

## 2023-04-27 NOTE — PROGRESS NOTES
"Subjective     Patient ID: Joan Wetzel is a 52 y.o. female.    Chief Complaint: Anxiety and Diabetes    The patient location is: [place]  The chief complaint leading to consultation is: Anxiety/Depression    Visit type: audiovisual    Face to Face time with patient: 7 minutes.  12 minutes of total time spent on the encounter, which includes face to face time and non-face to face time preparing to see the patient (eg, review of tests), Obtaining and/or reviewing separately obtained history, Documenting clinical information in the electronic or other health record, Independently interpreting results (not separately reported) and communicating results to the patient/family/caregiver, or Care coordination (not separately reported).     Each patient to whom he or she provides medical services by telemedicine is:  (1) informed of the relationship between the physician and patient and the respective role of any other health care provider with respect to management of the patient; and (2) notified that he or she may decline to receive medical services by telemedicine and may withdraw from such care at any time.    Notes:   Last seen 3 months ago with hypertension and diabetes controlled, c/o depression and anxiety. Started Sertraline 25mg. Presents for f/u complaining of side effects, discontinued it after 2-3 doses, it "felt too strong". Mood disturbance persists, she requests an alternative. Sleeping better with Zolpidem - uses sparingly.  Also c/o un-dissolved large white capsules in her stool - which is likely Metformin ER. We can change to regular release Metformin. Her stool has been soft lately, and more frequent - three bowel movements a day. She admits inconsistent compliance with Creon. No home glucose monitoring reported.    PMH:  Hypertension.  Diabetes Type 2, HbA1c 6.4% Jan. '23.  Hyperlipidemia,  July '22.  Pancreatitis, pancreatic insufficiency.  Vitamin D insufficiency.  GERD.    PSH: reviewed. "     Pap normal 11/20. Mammogram normal 1/23. Colonoscopy 3/21 - diverticulosis.     Social: former tobacco use, alcohol 1 a week.  , 3 adult children, lives alone.  of an apartment complex.    FMH: HTN, DM, Stroke, Heart dis, Stomach cancer, Lung cancer, Alcohol abuse, Dementia.    Allergies: HCTZ, pineapple.     Medications: list reviewed and reconciled.     Review of Systems   Constitutional:  Positive for unexpected weight change. Negative for activity change.   HENT:  Negative for hearing loss, rhinorrhea and trouble swallowing.    Eyes:  Negative for discharge and visual disturbance.   Respiratory:  Negative for chest tightness and wheezing.    Cardiovascular:  Negative for chest pain and palpitations.   Gastrointestinal:  Positive for diarrhea. Negative for blood in stool, constipation and vomiting.   Endocrine: Negative for polydipsia and polyuria.   Genitourinary:  Negative for difficulty urinating, dysuria, hematuria and menstrual problem.   Musculoskeletal:  Positive for arthralgias. Negative for joint swelling and neck pain.   Neurological:  Negative for weakness and headaches.   Psychiatric/Behavioral:  Positive for dysphoric mood. Negative for confusion.         Objective     Physical Exam  Constitutional:       General: She is not in acute distress.  Pulmonary:      Effort: Pulmonary effort is normal. No respiratory distress.   Neurological:      Mental Status: She is alert.   Psychiatric:         Mood and Affect: Mood normal.         Behavior: Behavior normal.         Thought Content: Thought content normal.        Assessment and Plan     Problem List Items Addressed This Visit       Type 2 diabetes mellitus, without long-term current use of insulin    Relevant Medications    metFORMIN (GLUCOPHAGE) 500 MG tablet     Other Visit Diagnoses       Adjustment disorder with mixed anxiety and depressed mood        Relevant Medications    EScitalopram oxalate (LEXAPRO) 5 MG Tab             Adjustment disorder with mixed anxiety and depressed mood  -     Change Sertraline to EScitalopram oxalate (LEXAPRO) 5 MG Tab; Take 1 tablet (5 mg total) by mouth once daily.  Dispense: 30 tablet; Refill: 2    Type 2 diabetes mellitus without complication, without long-term current use of insulin  -     Change extended release to regular MetFORMIN (GLUCOPHAGE) 500 MG tablet; Take 1 tablet (500 mg total) by mouth 2 (two) times daily with meals.  Dispense: 180 tablet; Refill: 1

## 2023-05-02 NOTE — PROGRESS NOTES
GI NUTRITIONAL ASSESSMENT  Referring Provider: AKI Heller   Joan Wetzel is a 52 y.o. female   Reason for Visit: Nutrition assessment and recommendations related to:   Chief Complaint   Patient presents with    Pancreaitis recurrent      Idiopathic ; ER Visit 22 with resolution 4-5 days later ; using creon with meals     S/P cholecysectomy     Type II DM      Metformin        Medical/Surgical History  Pertinent Social History:  Social History     Tobacco Use    Smoking status: Former     Years: 10.00     Types: Cigarettes    Smokeless tobacco: Never    Tobacco comments:     Quit    Substance Use Topics    Alcohol use: Yes     Alcohol/week: 2.0 standard drinks     Types: 2 Glasses of wine per week     Comment: social     Drug use: No        Previous Medical History:  Past Medical History:   Diagnosis Date    GERD (gastroesophageal reflux disease)     Hyperlipidemia associated with type 2 diabetes mellitus     Hypertension     Mental disorder     Pancreatitis     Type 2 diabetes mellitus     Vitamin D deficiency        Previous Surgical History:  Past Surgical History:   Procedure Laterality Date     SECTION      COLONOSCOPY N/A 3/18/2021    Procedure: COLONOSCOPY;  Surgeon: Celestino Walker MD;  Location: [facility] (4TH FLR);  Service: Endoscopy;  Laterality: N/A;  covid screening PCW 3/15 - christen    ENDOMETRIAL ABLATION      ENDOSCOPIC ULTRASOUND OF UPPER GASTROINTESTINAL TRACT N/A 2019    Procedure: ULTRASOUND, UPPER GI TRACT, ENDOSCOPIC;  Surgeon: Kristian Wakefield MD;  Location: [facility] (2ND FLR);  Service: Endoscopy;  Laterality: N/A;  To be done end of  along with EGD evaluation to r/o PUD.    ENDOSCOPIC ULTRASOUND OF UPPER GASTROINTESTINAL TRACT N/A 2019    Procedure: ULTRASOUND, UPPER GI TRACT, ENDOSCOPIC;  Surgeon: Po Noonan MD;  Location: [facility] (2ND FLR);  Service: Endoscopy;  Laterality: N/A;  CT still shows a spot in the pancreas. It has not changed,  and likely just represents an area of inflammation, but I think we should take another look with EUS.  Dr SHERLEY Wakefield  PM prep - pg  10/31/19 appt confirmed-rb    ENDOSCOPIC ULTRASOUND OF UPPER GASTROINTESTINAL TRACT N/A 10/15/2021    Procedure: ULTRASOUND, UPPER GI TRACT, ENDOSCOPIC;  Surgeon: Rick Mooney MD;  Location: [facility] ENDO (2ND FLR);  Service: Endoscopy;  Laterality: N/A;  Covid-19 test 10/12 .Eduin-instructions sent via portal 10/5-MG    ESOPHAGOGASTRODUODENOSCOPY N/A 6/24/2019    Procedure: EGD (ESOPHAGOGASTRODUODENOSCOPY);  Surgeon: Kristian Wakefield MD;  Location: [facility] ENDO (2ND FLR);  Service: Endoscopy;  Laterality: N/A;    LAPAROSCOPIC CHOLECYSTECTOMY N/A 7/24/2019    Procedure: CHOLECYSTECTOMY, LAPAROSCOPIC;  Surgeon: Huber Neri MD;  Location: [facility] OR 2ND FLR;  Service: General;  Laterality: N/A;    TUBAL LIGATION  04/1994    upper gi        Anthropometric Data Usual Weight:  reports stable weight with loss of 5# during flares and post recovery weight stabilization   Vitals - 1 value per visit 11/23/2022 12/15/2022   Weight (lb) 156.9 156.09     Vitals - 1 value per visit 12/19/2022   Weight (lb) 160      Meds  and Biochemical Data   Labs  Assessment of Lab Values: [facility] during ER visit   Pertinent Medications Reviewed: Creon , Metformin   Vitamin/Supplements/Herbs: Vit B12     Potential Food Drug Interactions Addressed: reviewed creon use and need to take at time of meal and with snacks   Medication reviewed for interactions. Yes   Allergies:  Review of patient's allergies indicates:   Allergen Reactions    Pineapple Swelling    Hydrochlorothiazide Other (See Comments)     CAUSING PANCREATITIS       Dietary Data  Works as    Problematic Foods: Fatty foods , lactose   Meal patterns:  irregular eating pattern   Breakfast : yogurt   Lunch: Salad   Supper : baked chicken , potatoes or rice   Fruit:  none   Vegetables:  salads   Meal preparation/shopping: self  Nutrition  beverages: water, Coke Zero, SF beverages, Wine/vodka   Dining out: Regular, 2-3 times per week    Chewing /Swallowing capabilities   normal dentition for age      Calories: 1600 ( MSJ x 1.2)  Protein: 60 grams @ .8gram/kg   Fluid: 1800cc @25cc/kg     Nutrition Diagnosis   Primary Problem: Altered GI function  Etiology: Related to inflammation of the pancreas and decreased ability to consume sufficient calories  Signs/symptoms: As evidenced by pt report of GI pain/nausea/vomiting, inadequate intake of calories per diet recall and recent weight loss of    Patient Assessment: Patient is at increased nutrition risk due to dx of pancreatitis and need for diet education to manage symptoms and ensure adequate nutrition.    Patient knowledge of healthy eating and exercise patterns was assessed to be low . Session was spent educating patient on eating small, more frequent meals and snacks (6-8 times/day), minimizing fat intake/food produced with added fat (ex. Biscuits, doughnuts, pastries), choosing lean sources of protein and dairy products and avoiding greasy, high-fat meats and processed meat products (sausage, huber, hotdogs).  Emphasis was placed on meal planning and quick, easy methods of food preparation (ex. using a crockpot, using canned or frozen vegetables, canned meats) to ensure adherence. Reviewed strategies for choosing and consuming healthy, well-balanced meals and snacks regularly that follow guidelines for the post-Pancreatitis diet.     Discussed diet advancement steps from Phase 1: clear liquids, plain/unseasoned, low-fiber carbohydrates (crackers, cream of rice/wheat, white toast, white pasta), soft canned fruits, and potatoes to Phase 2: low-fat milk, broth-based soups, baked/grilled lean meat, yogurt/pudding/cottage cheese, eggs, cereal, pasta/rice with small amount of olive oil and seasoning. Phase 3 continues advancing food choices until a normal, healthy, lower-fat diet is resumed. All phases  outlined in [facility] Post-Pancreatitis Diet Advancement Handout given to patient.      Discussed need for vitamin and mineral supplementation (salud. fat-soluble vitamin supplements). Advised patient of potential need to add nutrition supplement beverages 1-2x daily.  Patient verbalized understanding.  Provided RD contact information for concerns or questions. Further education will be required to ensure adherence to Pancreatitis Nutrition Therapy eating plan.  Expect fair compliance with dietary recommendations at this time.     Primary Problem: Altered GI function  Etiology: Related to inflammation of the pancreas and decreased ability to consume sufficient calories  Signs/symptoms: As evidenced by pt report of GI pain/nausea/vomiting, inadequate intake of calories per diet recall and recent weight loss of 5#   Education Materials provided:   1. Academy of Nutrition and Dietetics/ NCM: Pancreatitis Nutrition Therapy  2. [place]: Post-Pancreatitis Diet Advancement  Acute and chronic Pancreatitis nutrition guidelines ( low fat/ limited simple sugars) [facility] GI Nutrition   3.  Guidelines for BG control with diet - lean protein or sf Meal replacement ; vegetables , starches   4. Low Lactose - [facility] Lactose Free guidelines ;Options for meal replacements low in Lactose                  M = Nutrition Monitoring   Indicator 1. PO intake/weight   Indicator 2. Diet adherence /tolerance      E= Nutrition Evaluation  Goal 1. PO intake stays consistent and patient weight remains stable    Goal 2. Patient adherence to prescribed diet modifications to support medical care of condition .        Patient and/or family comprehend instructions: yes  Outcome: Verbalizes understanding  TXP GI Nutrition barriers: emotional/psychosocial  Consultation Time: 60  Minutes  F/U: Per referring provider   Communication provided to care team via [facility]     Diagnoses:  1. Idiopathic acute pancreatitis without infection  or necrosis    2. Type 2 diabetes mellitus without complication, without long-term current use of insulin

## 2023-07-06 ENCOUNTER — OFFICE VISIT (OUTPATIENT)
Dept: PRIMARY CARE CLINIC | Facility: CLINIC | Age: 53
End: 2023-07-06
Payer: COMMERCIAL

## 2023-07-06 VITALS
OXYGEN SATURATION: 97 % | BODY MASS INDEX: 29.1 KG/M2 | WEIGHT: 164.25 LBS | HEIGHT: 63 IN | HEART RATE: 80 BPM | DIASTOLIC BLOOD PRESSURE: 90 MMHG | SYSTOLIC BLOOD PRESSURE: 132 MMHG

## 2023-07-06 DIAGNOSIS — L08.9 SKIN INFECTION: Primary | ICD-10-CM

## 2023-07-06 PROCEDURE — 99999 PR PBB SHADOW E&M-EST. PATIENT-LVL V: CPT | Mod: PBBFAC,,, | Performed by: NURSE PRACTITIONER

## 2023-07-06 PROCEDURE — 3075F PR MOST RECENT SYSTOLIC BLOOD PRESS GE 130-139MM HG: ICD-10-PCS | Mod: CPTII,S$GLB,, | Performed by: NURSE PRACTITIONER

## 2023-07-06 PROCEDURE — 3066F PR DOCUMENTATION OF TREATMENT FOR NEPHROPATHY: ICD-10-PCS | Mod: CPTII,S$GLB,, | Performed by: NURSE PRACTITIONER

## 2023-07-06 PROCEDURE — 99213 PR OFFICE/OUTPT VISIT, EST, LEVL III, 20-29 MIN: ICD-10-PCS | Mod: S$GLB,,, | Performed by: NURSE PRACTITIONER

## 2023-07-06 PROCEDURE — 3008F BODY MASS INDEX DOCD: CPT | Mod: CPTII,S$GLB,, | Performed by: NURSE PRACTITIONER

## 2023-07-06 PROCEDURE — 1160F PR REVIEW ALL MEDS BY PRESCRIBER/CLIN PHARMACIST DOCUMENTED: ICD-10-PCS | Mod: CPTII,S$GLB,, | Performed by: NURSE PRACTITIONER

## 2023-07-06 PROCEDURE — 99213 OFFICE O/P EST LOW 20 MIN: CPT | Mod: S$GLB,,, | Performed by: NURSE PRACTITIONER

## 2023-07-06 PROCEDURE — 1160F RVW MEDS BY RX/DR IN RCRD: CPT | Mod: CPTII,S$GLB,, | Performed by: NURSE PRACTITIONER

## 2023-07-06 PROCEDURE — 3080F DIAST BP >= 90 MM HG: CPT | Mod: CPTII,S$GLB,, | Performed by: NURSE PRACTITIONER

## 2023-07-06 PROCEDURE — 1159F PR MEDICATION LIST DOCUMENTED IN MEDICAL RECORD: ICD-10-PCS | Mod: CPTII,S$GLB,, | Performed by: NURSE PRACTITIONER

## 2023-07-06 PROCEDURE — 1159F MED LIST DOCD IN RCRD: CPT | Mod: CPTII,S$GLB,, | Performed by: NURSE PRACTITIONER

## 2023-07-06 PROCEDURE — 3075F SYST BP GE 130 - 139MM HG: CPT | Mod: CPTII,S$GLB,, | Performed by: NURSE PRACTITIONER

## 2023-07-06 PROCEDURE — 3066F NEPHROPATHY DOC TX: CPT | Mod: CPTII,S$GLB,, | Performed by: NURSE PRACTITIONER

## 2023-07-06 PROCEDURE — 3008F PR BODY MASS INDEX (BMI) DOCUMENTED: ICD-10-PCS | Mod: CPTII,S$GLB,, | Performed by: NURSE PRACTITIONER

## 2023-07-06 PROCEDURE — 3044F PR MOST RECENT HEMOGLOBIN A1C LEVEL <7.0%: ICD-10-PCS | Mod: CPTII,S$GLB,, | Performed by: NURSE PRACTITIONER

## 2023-07-06 PROCEDURE — 3044F HG A1C LEVEL LT 7.0%: CPT | Mod: CPTII,S$GLB,, | Performed by: NURSE PRACTITIONER

## 2023-07-06 PROCEDURE — 3061F PR NEG MICROALBUMINURIA RESULT DOCUMENTED/REVIEW: ICD-10-PCS | Mod: CPTII,S$GLB,, | Performed by: NURSE PRACTITIONER

## 2023-07-06 PROCEDURE — 99999 PR PBB SHADOW E&M-EST. PATIENT-LVL V: ICD-10-PCS | Mod: PBBFAC,,, | Performed by: NURSE PRACTITIONER

## 2023-07-06 PROCEDURE — 3061F NEG MICROALBUMINURIA REV: CPT | Mod: CPTII,S$GLB,, | Performed by: NURSE PRACTITIONER

## 2023-07-06 PROCEDURE — 3080F PR MOST RECENT DIASTOLIC BLOOD PRESSURE >= 90 MM HG: ICD-10-PCS | Mod: CPTII,S$GLB,, | Performed by: NURSE PRACTITIONER

## 2023-07-06 RX ORDER — CEPHALEXIN 500 MG/1
500 CAPSULE ORAL EVERY 12 HOURS
Qty: 10 CAPSULE | Refills: 0 | Status: SHIPPED | OUTPATIENT
Start: 2023-07-06 | End: 2023-11-21

## 2023-07-06 NOTE — PROGRESS NOTES
[facility] Note    Chief Complaint      Chief Complaint   Patient presents with    left abdominal skin irritation       History of Present Illness      Joan Wetzel is a 52 y.o. female who presents today for   Chief Complaint   Patient presents with    left abdominal skin irritation         Patient reports having round reddish skin irritation to her right abdomen. She has not tried treatment for this.       Review of Systems   Skin:         + left abdominal reddened skin area.   All 12 systems otherwise negative.     Family History:  family history includes Alcohol abuse in her father and mother; Aneurysm in her mother; Coronary artery disease in her paternal grandmother; Dementia in her paternal grandfather and paternal grandmother; Diabetes in her maternal aunt and maternal uncle; Heart disease in her maternal uncle; Heart failure in her mother; Hypertension in her father; Liver disease in her father; Lung cancer in her maternal uncle; Stomach cancer in her maternal aunt; Stroke in her mother.   Family history was reviewed with patient.     Medications:  Outpatient Encounter Medications as of 7/6/2023   Medication Sig Dispense Refill    amLODIPine (NORVASC) 10 MG tablet Take 1 tablet (10 mg total) by mouth once daily. 90 tablet 1    atorvastatin (LIPITOR) 20 MG tablet Take 1 tablet (20 mg total) by mouth once daily. 90 tablet 3    blood-glucose meter kit Monitor as directed. Per insurance coverage. 1 each 0    cetirizine (ZYRTEC) 10 MG tablet Take 1 tablet (10 mg total) by mouth once daily. 90 tablet 3    dicyclomine (BENTYL) 10 MG capsule Take 1 capsule (10 mg total) by mouth 3 (three) times daily as needed (Abdominal pain.). 90 capsule 0    ergocalciferol (ERGOCALCIFEROL) 50,000 unit Cap Take 1 capsule (50,000 Units total) by mouth every 7 days. 12 capsule 3    EScitalopram oxalate (LEXAPRO) 5 MG Tab Take 1 tablet (5 mg total) by mouth once daily. 30 tablet 2    fluocinonide 0.05% (LIDEX)  0.05 % cream Apply topically 2 (two) times daily. Prn itch. 60 g 0    lipase-protease-amylase 24,000-76,000-120,000 units (CREON) 24,000-76,000 -120,000 unit capsule Take 2 capsules by mouth 4 (four) times daily with meals and nightly. 240 capsule 1    meloxicam (MOBIC) 15 MG tablet Take 1 tablet (15 mg total) by mouth once daily. 30 tablet 0    metFORMIN (GLUCOPHAGE) 500 MG tablet Take 1 tablet (500 mg total) by mouth 2 (two) times daily with meals. 180 tablet 1    naproxen (NAPROSYN) 500 MG tablet Take 1 tablet (500 mg total) by mouth 2 (two) times daily with meals. 30 tablet 0    pantoprazole (PROTONIX) 20 MG tablet Take 1 tablet (20 mg total) by mouth once daily. 90 tablet 1    sucralfate (CARAFATE) 1 gram tablet Take 1 tablet (1 g total) by mouth 3 (three) times daily as needed (dyspepsia). 90 tablet 0    zolpidem (AMBIEN) 5 MG Tab Take 1 tablet (5 mg total) by mouth nightly as needed (Insomnia.). 20 tablet 0    cephALEXin (KEFLEX) 500 MG capsule Take 1 capsule (500 mg total) by mouth every 12 (twelve) hours. 10 capsule 0    senna-docusate 8.6-50 mg (PERICOLACE) 8.6-50 mg per tablet Take 1 tablet by mouth 2 (two) times daily as needed.       Facility-Administered Encounter Medications as of 7/6/2023   Medication Dose Route Frequency Provider Last Rate Last Admin    0.9%  NaCl infusion   Intravenous Continuous Kristian Wakefield MD        sodium chloride 0.9% flush 10 mL  10 mL Intravenous PRN Kristian Wakefield MD           Allergies:  Review of patient's allergies indicates:   Allergen Reactions    Pineapple Swelling    Hydrochlorothiazide Other (See Comments)     CAUSING PANCREATITIS        Health Maintenance:  Health Maintenance   Topic Date Due    Foot Exam  03/31/2023    Lipid Panel  07/15/2023    Hemoglobin A1c  07/23/2023    Mammogram  01/05/2024    Eye Exam  02/28/2024    Low Dose Statin  07/06/2024    TETANUS VACCINE  09/19/2027    Hepatitis C Screening  Completed     Health Maintenance Topics with due status:  "Not Due       Topic Last Completion Date    TETANUS VACCINE 09/19/2017    Cervical Cancer Screening 11/24/2020    Colorectal Cancer Screening 03/18/2021    Influenza Vaccine 09/14/2021    Lipid Panel 07/15/2022    Mammogram 01/05/2023    Diabetes Urine Screening 01/23/2023    Hemoglobin A1c 01/23/2023    Eye Exam 02/28/2023    Low Dose Statin 07/06/2023       Physical Exam      Vital Signs  Pulse: 80  SpO2: 97 %  BP: (!) 132/90  BP Location: Right arm  Patient Position: Sitting  Pain Score:   5  Height and Weight  Height: 5' 3" (160 cm)  Weight: 74.5 kg (164 lb 3.9 oz)  BSA (Calculated - sq m): 1.82 sq meters  BMI (Calculated): 29.1  Weight in (lb) to have BMI = 25: 140.8]    Physical Exam  Constitutional:       Appearance: Normal appearance. She is normal weight.   HENT:      Head: Normocephalic and atraumatic.      Nose: Nose normal.      Mouth/Throat:      Mouth: Mucous membranes are moist.      Pharynx: Oropharynx is clear.   Eyes:      Extraocular Movements: Extraocular movements intact.      Conjunctiva/sclera: Conjunctivae normal.      Pupils: Pupils are equal, round, and reactive to light.   Cardiovascular:      Rate and Rhythm: Normal rate and regular rhythm.   Pulmonary:      Effort: Pulmonary effort is normal.      Breath sounds: Normal breath sounds.   Musculoskeletal:         General: Normal range of motion.      Cervical back: Normal range of motion and neck supple.   Skin:     General: Skin is warm and dry.      Capillary Refill: Capillary refill takes less than 2 seconds.      Findings: Erythema present.      Comments: + round erythematic skin area, approximately size of a quarter, noted to left side of abdomen.    Neurological:      General: No focal deficit present.      Mental Status: She is alert and oriented to person, place, and time. Mental status is at baseline.   Psychiatric:         Mood and Affect: Mood normal.         Behavior: Behavior normal.         Thought Content: Thought content " normal.         Judgment: Judgment normal.          Assessment/Plan     Joan Wetzel is a 52 y.o.female with:    Skin infection  -     cephALEXin (KEFLEX) 500 MG capsule; Take 1 capsule (500 mg total) by mouth every 12 (twelve) hours.  Dispense: 10 capsule; Refill: 0      - Make annual appointment with PCP to renew medications, etc.    As above, continue current medications and maintain follow up with specialists.  Return to clinic as needed.    Greater than 50% of visit was spent face to face with patient.  All questions were answered to patient's satisfaction.            Siri Woods, NP-C  [facility]

## 2023-07-23 DIAGNOSIS — F43.23 ADJUSTMENT DISORDER WITH MIXED ANXIETY AND DEPRESSED MOOD: ICD-10-CM

## 2023-07-23 DIAGNOSIS — K21.9 GASTROESOPHAGEAL REFLUX DISEASE WITHOUT ESOPHAGITIS: ICD-10-CM

## 2023-07-23 RX ORDER — ESCITALOPRAM OXALATE 5 MG/1
TABLET ORAL
Qty: 90 TABLET | Refills: 1 | Status: SHIPPED | OUTPATIENT
Start: 2023-07-23 | End: 2023-11-21

## 2023-07-23 RX ORDER — PANTOPRAZOLE SODIUM 20 MG/1
TABLET, DELAYED RELEASE ORAL
Qty: 90 TABLET | Refills: 3 | Status: SHIPPED | OUTPATIENT
Start: 2023-07-23

## 2023-07-23 NOTE — TELEPHONE ENCOUNTER
Refill Routing Note   Medication(s) are not appropriate for processing by [facility] for the following reason(s):      New or recently adjusted medication    [facility] action(s):  Defer  Approve Care Due:  None identified            Appointments  past 12m or future 3m with PCP    Date Provider   Last Visit   4/24/2023 Melissa Neely MD   Next Visit   11/21/2023 Melissa Neely MD   ED visits in past 90 days: 0        Note composed:3:12 AM 07/23/2023

## 2023-07-26 DIAGNOSIS — E11.9 TYPE 2 DIABETES MELLITUS WITHOUT COMPLICATION: ICD-10-CM

## 2023-07-31 ENCOUNTER — PATIENT MESSAGE (OUTPATIENT)
Dept: ADMINISTRATIVE | Facility: HOSPITAL | Age: 53
End: 2023-07-31
Payer: COMMERCIAL

## 2023-08-06 DIAGNOSIS — I10 ESSENTIAL HYPERTENSION: ICD-10-CM

## 2023-08-06 NOTE — TELEPHONE ENCOUNTER
Care Due:                  Date            Visit Type   Department     Provider  --------------------------------------------------------------------------------                                ESTABLISHED                              PATIENT -    [facility] PRIMARY   Melissa Cassy  Last Visit: 04-      VIRTUAL      CARE           [place]                               -                              PRIMARY      [facility] PRIMARY   Melissa Cassy  Next Visit: 11-      CARE ([facility])   CARE           [place]                                                            Last  Test          Frequency    Reason                     Performed    Due Date  --------------------------------------------------------------------------------    HBA1C.......  6 months...  metFORMIN................  01- 07-    [employer] Embedded Care Due Messages. Reference number: [id number].   8/06/2023 12:56:32 AM CDT

## 2023-08-07 RX ORDER — AMLODIPINE BESYLATE 10 MG/1
10 TABLET ORAL DAILY
Qty: 90 TABLET | Refills: 1 | Status: SHIPPED | OUTPATIENT
Start: 2023-08-07 | End: 2023-11-21 | Stop reason: SDUPTHER

## 2023-08-07 NOTE — TELEPHONE ENCOUNTER
Refill Routing Note   Medication(s) are not appropriate for processing by [facility] for the following reason(s):      Required vitals abnormal: BP (!) 132/90    [facility] action(s):  Defer Care Due:  Labs due   Medication Therapy Plan: Lab (a1c) due 7.23.2023      Appointments  past 12m or future 3m with PCP    Date Provider   Last Visit   4/24/2023 Melissa Neely MD   Next Visit   11/21/2023 Melissa Neely MD   ED visits in past 90 days: 0        Note composed:1:33 PM 08/07/2023

## 2023-10-26 ENCOUNTER — OFFICE VISIT (OUTPATIENT)
Dept: URGENT CARE | Facility: CLINIC | Age: 53
End: 2023-10-26
Payer: COMMERCIAL

## 2023-10-26 VITALS
BODY MASS INDEX: 28.85 KG/M2 | WEIGHT: 162.81 LBS | DIASTOLIC BLOOD PRESSURE: 78 MMHG | RESPIRATION RATE: 18 BRPM | HEIGHT: 63 IN | OXYGEN SATURATION: 98 % | TEMPERATURE: 98 F | HEART RATE: 78 BPM | SYSTOLIC BLOOD PRESSURE: 149 MMHG

## 2023-10-26 DIAGNOSIS — R05.9 COUGH, UNSPECIFIED TYPE: ICD-10-CM

## 2023-10-26 DIAGNOSIS — J30.9 ALLERGIC RHINITIS WITH POSTNASAL DRIP: Primary | ICD-10-CM

## 2023-10-26 DIAGNOSIS — R09.82 ALLERGIC RHINITIS WITH POSTNASAL DRIP: Primary | ICD-10-CM

## 2023-10-26 LAB
CTP QC/QA: YES
SARS-COV-2 AG RESP QL IA.RAPID: NEGATIVE

## 2023-10-26 PROCEDURE — 87811 SARS-COV-2 COVID19 W/OPTIC: CPT | Mod: QW,S$GLB,, | Performed by: FAMILY MEDICINE

## 2023-10-26 PROCEDURE — 87811 SARS CORONAVIRUS 2 ANTIGEN POCT, MANUAL READ: ICD-10-PCS | Mod: QW,S$GLB,, | Performed by: FAMILY MEDICINE

## 2023-10-26 PROCEDURE — 99213 PR OFFICE/OUTPT VISIT, EST, LEVL III, 20-29 MIN: ICD-10-PCS | Mod: S$GLB,,, | Performed by: FAMILY MEDICINE

## 2023-10-26 PROCEDURE — 99213 OFFICE O/P EST LOW 20 MIN: CPT | Mod: S$GLB,,, | Performed by: FAMILY MEDICINE

## 2023-10-26 RX ORDER — OXYCODONE AND ACETAMINOPHEN 5; 325 MG/1; MG/1
1 TABLET ORAL EVERY 6 HOURS PRN
COMMUNITY
Start: 2023-05-02 | End: 2023-11-21

## 2023-10-26 RX ORDER — ONDANSETRON 4 MG/1
4 TABLET, ORALLY DISINTEGRATING ORAL EVERY 8 HOURS PRN
COMMUNITY
Start: 2023-05-02

## 2023-10-26 RX ORDER — PROMETHAZINE HYDROCHLORIDE AND DEXTROMETHORPHAN HYDROBROMIDE 6.25; 15 MG/5ML; MG/5ML
5 SYRUP ORAL EVERY 8 HOURS PRN
Qty: 180 ML | Refills: 0 | Status: SHIPPED | OUTPATIENT
Start: 2023-10-26 | End: 2023-11-21

## 2023-10-26 RX ORDER — CETIRIZINE HYDROCHLORIDE 10 MG/1
10 TABLET ORAL DAILY
Qty: 3 TABLET | Refills: 0 | Status: SHIPPED | OUTPATIENT
Start: 2023-10-26 | End: 2023-10-29

## 2023-10-26 RX ORDER — PREDNISONE 20 MG/1
40 TABLET ORAL DAILY
Qty: 10 TABLET | Refills: 0 | Status: SHIPPED | OUTPATIENT
Start: 2023-10-26 | End: 2023-11-21

## 2023-10-26 NOTE — PROGRESS NOTES
"Subjective:      Patient ID: Joan Wetzel is a 53 y.o. female.    Vitals:  height is 5' 3" (1.6 m) and weight is 73.9 kg (162 lb 13 oz). Her oral temperature is 98.4 °F (36.9 °C). Her blood pressure is 149/78 (abnormal) and her pulse is 78. Her respiration is 18 and oxygen saturation is 98%.     Chief Complaint: Sinus Problem    Pt presents today w/ nasal congestion accompanied w/ sore throat and productive cough (green sputum ); onset Tuesday 10/24/23. Pt c/o chills, ear pain, headache, hoarse voice, sensation of SOB, sinus pressure, sneezing and swollen glands. Pt also c/o  pain from lt hip down to lt foot and numbness sensation in fingertips ; onset approx a week ago. Pt denies any fever or nausea.  Pt tried tylenol and cough suppressant; mild relief.      Sinus Problem  This is a new problem. The current episode started in the past 7 days. The problem is unchanged. There has been no fever. Her pain is at a severity of 7/10. The pain is moderate. Associated symptoms include chills, congestion, coughing, ear pain, headaches, a hoarse voice, shortness of breath, sinus pressure, sneezing, a sore throat and swollen glands. Pertinent negatives include no diaphoresis or neck pain. Treatments tried: tylenol,cough supressant. The treatment provided mild relief.       Constitution: Positive for chills. Negative for sweating.   HENT:  Positive for ear pain, congestion, sinus pressure and sore throat.    Neck: Negative for neck pain.   Respiratory:  Positive for cough and shortness of breath.    Allergic/Immunologic: Positive for sneezing.   Neurological:  Positive for headaches.      Objective:     Physical Exam   Constitutional: She is oriented to person, place, and time. She appears well-developed. She is cooperative.  Non-toxic appearance. She does not appear ill. No distress.   HENT:   Head: Normocephalic and atraumatic.   Ears:   Right Ear: Hearing, tympanic membrane and external ear normal.   Left Ear: Hearing, " tympanic membrane and external ear normal.   Nose: Nose normal. No mucosal edema, rhinorrhea or nasal deformity. No epistaxis. Right sinus exhibits no maxillary sinus tenderness and no frontal sinus tenderness. Left sinus exhibits no maxillary sinus tenderness and no frontal sinus tenderness.   Mouth/Throat: Uvula is midline, oropharynx is clear and moist and mucous membranes are normal. No trismus in the jaw. Normal dentition. No uvula swelling. No oropharyngeal exudate, posterior oropharyngeal edema or posterior oropharyngeal erythema.   Eyes: Conjunctivae and lids are normal. No scleral icterus.   Neck: Trachea normal and phonation normal. Neck supple. No edema present. No erythema present. No neck rigidity present.   Cardiovascular: Normal rate, regular rhythm, normal heart sounds and normal pulses.   Pulmonary/Chest: Effort normal and breath sounds normal. No respiratory distress. She has no decreased breath sounds. She has no rhonchi.   Abdominal: Normal appearance.   Musculoskeletal: Normal range of motion.         General: No deformity. Normal range of motion.   Neurological: She is alert and oriented to person, place, and time. She exhibits normal muscle tone. Coordination normal.   Skin: Skin is warm, dry, intact, not diaphoretic and not pale.   Psychiatric: Her speech is normal and behavior is normal. Judgment and thought content normal.   Nursing note and vitals reviewed.    Results for orders placed or performed in visit on 10/26/23   SARS Coronavirus 2 Antigen, POCT Manual Read   Result Value Ref Range    SARS Coronavirus 2 Antigen Negative Negative     Acceptable Yes        Assessment:     1. Allergic rhinitis with postnasal drip    2. Cough, unspecified type        Plan:       Allergic rhinitis with postnasal drip  -     predniSONE (DELTASONE) 20 MG tablet; Take 2 tablets (40 mg total) by mouth once daily. for 5 days  Dispense: 10 tablet; Refill: 0  -     cetirizine (ZYRTEC) 10 MG  tablet; Take 1 tablet (10 mg total) by mouth once daily. for 3 doses  Dispense: 3 tablet; Refill: 0    Cough, unspecified type  -     SARS Coronavirus 2 Antigen, POCT Manual Read  -     promethazine-dextromethorphan (PROMETHAZINE-DM) 6.25-15 mg/5 mL Syrp; Take 5 mLs by mouth every 8 (eight) hours as needed.  Dispense: 180 mL; Refill: 0

## 2023-11-02 ENCOUNTER — PATIENT MESSAGE (OUTPATIENT)
Dept: PRIMARY CARE CLINIC | Facility: CLINIC | Age: 53
End: 2023-11-02
Payer: COMMERCIAL

## 2023-11-03 ENCOUNTER — LAB VISIT (OUTPATIENT)
Dept: LAB | Facility: HOSPITAL | Age: 53
End: 2023-11-03
Payer: COMMERCIAL

## 2023-11-03 ENCOUNTER — OFFICE VISIT (OUTPATIENT)
Dept: PRIMARY CARE CLINIC | Facility: CLINIC | Age: 53
End: 2023-11-03
Payer: COMMERCIAL

## 2023-11-03 ENCOUNTER — PATIENT OUTREACH (OUTPATIENT)
Dept: ADMINISTRATIVE | Facility: HOSPITAL | Age: 53
End: 2023-11-03
Payer: COMMERCIAL

## 2023-11-03 VITALS
DIASTOLIC BLOOD PRESSURE: 84 MMHG | HEIGHT: 63 IN | HEART RATE: 101 BPM | BODY MASS INDEX: 28.21 KG/M2 | OXYGEN SATURATION: 98 % | SYSTOLIC BLOOD PRESSURE: 118 MMHG | WEIGHT: 159.19 LBS

## 2023-11-03 DIAGNOSIS — R73.9 HYPERGLYCEMIA, DRUG-INDUCED: Primary | ICD-10-CM

## 2023-11-03 DIAGNOSIS — Z12.31 ENCOUNTER FOR SCREENING MAMMOGRAM FOR MALIGNANT NEOPLASM OF BREAST: Primary | ICD-10-CM

## 2023-11-03 DIAGNOSIS — T50.905A HYPERGLYCEMIA, DRUG-INDUCED: ICD-10-CM

## 2023-11-03 DIAGNOSIS — T50.905A HYPERGLYCEMIA, DRUG-INDUCED: Primary | ICD-10-CM

## 2023-11-03 DIAGNOSIS — R73.9 HYPERGLYCEMIA, DRUG-INDUCED: ICD-10-CM

## 2023-11-03 LAB — GLUCOSE SERPL-MCNC: 292 MG/DL (ref 70–110)

## 2023-11-03 PROCEDURE — 1159F PR MEDICATION LIST DOCUMENTED IN MEDICAL RECORD: ICD-10-PCS | Mod: CPTII,S$GLB,,

## 2023-11-03 PROCEDURE — 99999 PR PBB SHADOW E&M-EST. PATIENT-LVL IV: CPT | Mod: PBBFAC,,,

## 2023-11-03 PROCEDURE — 1160F RVW MEDS BY RX/DR IN RCRD: CPT | Mod: CPTII,S$GLB,,

## 2023-11-03 PROCEDURE — 3074F PR MOST RECENT SYSTOLIC BLOOD PRESSURE < 130 MM HG: ICD-10-PCS | Mod: CPTII,S$GLB,,

## 2023-11-03 PROCEDURE — 3044F PR MOST RECENT HEMOGLOBIN A1C LEVEL <7.0%: ICD-10-PCS | Mod: CPTII,S$GLB,,

## 2023-11-03 PROCEDURE — 3008F BODY MASS INDEX DOCD: CPT | Mod: CPTII,S$GLB,,

## 2023-11-03 PROCEDURE — 3066F NEPHROPATHY DOC TX: CPT | Mod: CPTII,S$GLB,,

## 2023-11-03 PROCEDURE — 99214 OFFICE O/P EST MOD 30 MIN: CPT | Mod: S$GLB,,,

## 2023-11-03 PROCEDURE — 3044F HG A1C LEVEL LT 7.0%: CPT | Mod: CPTII,S$GLB,,

## 2023-11-03 PROCEDURE — 3079F DIAST BP 80-89 MM HG: CPT | Mod: CPTII,S$GLB,,

## 2023-11-03 PROCEDURE — 99999 PR PBB SHADOW E&M-EST. PATIENT-LVL IV: ICD-10-PCS | Mod: PBBFAC,,,

## 2023-11-03 PROCEDURE — 3008F PR BODY MASS INDEX (BMI) DOCUMENTED: ICD-10-PCS | Mod: CPTII,S$GLB,,

## 2023-11-03 PROCEDURE — 80048 BASIC METABOLIC PNL TOTAL CA: CPT

## 2023-11-03 PROCEDURE — 3061F PR NEG MICROALBUMINURIA RESULT DOCUMENTED/REVIEW: ICD-10-PCS | Mod: CPTII,S$GLB,,

## 2023-11-03 PROCEDURE — 3066F PR DOCUMENTATION OF TREATMENT FOR NEPHROPATHY: ICD-10-PCS | Mod: CPTII,S$GLB,,

## 2023-11-03 PROCEDURE — 99214 PR OFFICE/OUTPT VISIT, EST, LEVL IV, 30-39 MIN: ICD-10-PCS | Mod: S$GLB,,,

## 2023-11-03 PROCEDURE — 36415 COLL VENOUS BLD VENIPUNCTURE: CPT | Mod: PN

## 2023-11-03 PROCEDURE — 3061F NEG MICROALBUMINURIA REV: CPT | Mod: CPTII,S$GLB,,

## 2023-11-03 PROCEDURE — 82962 GLUCOSE BLOOD TEST: CPT | Mod: S$GLB,,,

## 2023-11-03 PROCEDURE — 3074F SYST BP LT 130 MM HG: CPT | Mod: CPTII,S$GLB,,

## 2023-11-03 PROCEDURE — 3079F PR MOST RECENT DIASTOLIC BLOOD PRESSURE 80-89 MM HG: ICD-10-PCS | Mod: CPTII,S$GLB,,

## 2023-11-03 PROCEDURE — 1160F PR REVIEW ALL MEDS BY PRESCRIBER/CLIN PHARMACIST DOCUMENTED: ICD-10-PCS | Mod: CPTII,S$GLB,,

## 2023-11-03 PROCEDURE — 1159F MED LIST DOCD IN RCRD: CPT | Mod: CPTII,S$GLB,,

## 2023-11-03 PROCEDURE — 82962 POCT GLUCOSE, HAND-HELD DEVICE: ICD-10-PCS | Mod: S$GLB,,,

## 2023-11-03 RX ORDER — GLIMEPIRIDE 2 MG/1
2 TABLET ORAL
Qty: 10 TABLET | Refills: 0 | Status: SHIPPED | OUTPATIENT
Start: 2023-11-03 | End: 2023-11-21 | Stop reason: ALTCHOICE

## 2023-11-03 NOTE — PROGRESS NOTES
Health Maintenance Due   Topic Date Due    Pneumococcal Vaccines (Age 0-64) (1 - PCV) Never done    Low Dose Statin  Never done    Shingles Vaccine (1 of 2) Never done    Foot Exam  03/31/2023    Lipid Panel  07/15/2023    Hemoglobin A1c  07/23/2023    Influenza Vaccine (1) 09/01/2023    COVID-19 Vaccine (4 - 2023-24 season) 09/01/2023    Mammogram  01/05/2024     Chart reviewed.   Immunizations: Reconciled  Orders placed: Mammogram  Upcoming appts to satisfy [facility]RO[facility] topics: N/A

## 2023-11-03 NOTE — PROGRESS NOTES
[facility] Note    Chief Complaint      Chief Complaint   Patient presents with    Blood Sugar Problem     History of Present Illness      Joan Wetzel is a 53 y.o. female patient of Dr. Neely who presents today for elevated blood glucose levels x1 week.  This patient is new to me.   Patient has PMH pertinent for T2 dm, hypertension.      Patient seen on 10/26/2023 for allergic rhinitis and was given prednisone 40 mg x5 days.  Patent patient noticed her blood sugar was in the 500s, and went to the ER at [facility].  They gave her insulin injection and magnesium and discharged her home.    Today, Patient stated blood sugar has still been above 300.  Associated symptoms: Dizziness, lethargy, hot, dry, polydipsia, inability to focus.  Patient denies loss of consciousness, or polyuria  Treatments tried: none.      Health Maintenance   Topic Date Due    Low Dose Statin  Never done    Shingles Vaccine (1 of 2) Never done    Foot Exam  2023    Lipid Panel  07/15/2023    Hemoglobin A1c  2023    Mammogram  2024    Eye Exam  2024    TETANUS VACCINE  2027    Colorectal Cancer Screening  2031    Hepatitis C Screening  Completed       Past Medical History:   Diagnosis Date    GERD (gastroesophageal reflux disease)     Hyperlipidemia associated with type 2 diabetes mellitus     Hypertension     Mental disorder     Pancreatitis     Type 2 diabetes mellitus     Vitamin D deficiency        Past Surgical History:   Procedure Laterality Date     SECTION      COLONOSCOPY N/A 3/18/2021    Procedure: COLONOSCOPY;  Surgeon: Celestino Walker MD;  Location: [facility] (4TH FLR);  Service: Endoscopy;  Laterality: N/A;  covid screening PCW 3/15 - christen    ENDOMETRIAL ABLATION      ENDOSCOPIC ULTRASOUND OF UPPER GASTROINTESTINAL TRACT N/A 2019    Procedure: ULTRASOUND, UPPER GI TRACT, ENDOSCOPIC;  Surgeon: Kristian Wakefield MD;  Location: [facility] (2ND FLR);  Service: Endoscopy;   Laterality: N/A;  To be done end of June along with EGD evaluation to r/o PUD.    ENDOSCOPIC ULTRASOUND OF UPPER GASTROINTESTINAL TRACT N/A 11/6/2019    Procedure: ULTRASOUND, UPPER GI TRACT, ENDOSCOPIC;  Surgeon: Po Noonan MD;  Location: [facility] (2ND FLR);  Service: Endoscopy;  Laterality: N/A;  CT still shows a spot in the pancreas. It has not changed, and likely just represents an area of inflammation, but I think we should take another look with EUS.  Dr SHERLEY Wakefield  PM prep - pg  10/31/19 appt confirmed-rb    ENDOSCOPIC ULTRASOUND OF UPPER GASTROINTESTINAL TRACT N/A 10/15/2021    Procedure: ULTRASOUND, UPPER GI TRACT, ENDOSCOPIC;  Surgeon: Rick Mooney MD;  Location: [facility] ([facility]  Service: Endoscopy;  Laterality: N/A;  Covid-19 test 10/12 .Eduin-instructions sent via portal 10/5-MG    ESOPHAGOGASTRODUODENOSCOPY N/A 6/24/2019    Procedure: EGD (ESOPHAGOGASTRODUODENOSCOPY);  Surgeon: Kristian Wakefield MD;  Location: [facility] ([facility]  Service: Endoscopy;  Laterality: N/A;    LAPAROSCOPIC CHOLECYSTECTOMY N/A 7/24/2019    Procedure: CHOLECYSTECTOMY, LAPAROSCOPIC;  Surgeon: Huber Neri MD;  [facility];  Service: General;  Laterality: N/A;    TUBAL LIGATION  04/1994    upper gi         family history includes Alcohol abuse in her father and mother; Aneurysm in her mother; Coronary artery disease in her paternal grandmother; Dementia in her paternal grandfather and paternal grandmother; Diabetes in her maternal aunt and maternal uncle; Heart disease in her maternal uncle; Heart failure in her mother; Hypertension in her father; Liver disease in her father; Lung cancer in her maternal uncle; Stomach cancer in her maternal aunt; Stroke in her mother.    Social History     Tobacco Use    Smoking status: Former     Types: Cigarettes    Smokeless tobacco: Never    Tobacco comments:     Quit 2017   Substance Use Topics    Alcohol use: Yes     Alcohol/week: 2.0 standard drinks of  alcohol     Types: 2 Glasses of wine per week     Comment: social     Drug use: No       Review of Systems   Constitutional: Negative.    HENT: Negative.     Eyes: Negative.    Respiratory: Negative.     Cardiovascular: Negative.    Gastrointestinal: Negative.    Genitourinary:  Negative for frequency.   Neurological:  Positive for dizziness. Negative for focal weakness, loss of consciousness, weakness and headaches.        Lethargy   Endo/Heme/Allergies:  Positive for polydipsia.        Outpatient Encounter Medications as of 11/3/2023   Medication Sig Note Dispense Refill    amLODIPine (NORVASC) 10 MG tablet Take 1 tablet (10 mg total) by mouth once daily.  90 tablet 1    atorvastatin (LIPITOR) 20 MG tablet Take 1 tablet (20 mg total) by mouth once daily.  90 tablet 3    blood-glucose meter kit Monitor as directed. Per insurance coverage.  1 each 0    cetirizine (ZYRTEC) 10 MG tablet Take 1 tablet (10 mg total) by mouth once daily.  90 tablet 3    dicyclomine (BENTYL) 10 MG capsule Take 1 capsule (10 mg total) by mouth 3 (three) times daily as needed (Abdominal pain.).  90 capsule 0    ergocalciferol (ERGOCALCIFEROL) 50,000 unit Cap Take 1 capsule (50,000 Units total) by mouth every 7 days.  12 capsule 3    EScitalopram oxalate (LEXAPRO) 5 MG Tab TAKE 1 TABLET BY MOUTH EVERY DAY  90 tablet 1    fluocinonide 0.05% (LIDEX) 0.05 % cream Apply topically 2 (two) times daily. Prn itch.  60 g 0    meloxicam (MOBIC) 15 MG tablet Take 1 tablet (15 mg total) by mouth once daily.  30 tablet 0    metFORMIN (GLUCOPHAGE) 500 MG tablet Take 1 tablet (500 mg total) by mouth 2 (two) times daily with meals.  180 tablet 1    naproxen (NAPROSYN) 500 MG tablet Take 1 tablet (500 mg total) by mouth 2 (two) times daily with meals. 10/26/2023: PRN 30 tablet 0    ondansetron (ZOFRAN-ODT) 4 MG TbDL Take 4 mg by mouth every 8 (eight) hours as needed. 10/26/2023: PRN      pantoprazole (PROTONIX) 20 MG tablet TAKE 1 TABLET BY MOUTH EVERY DAY   "90 tablet 3    senna-docusate 8.6-50 mg (PERICOLACE) 8.6-50 mg per tablet Take 1 tablet by mouth 2 (two) times daily as needed.       sucralfate (CARAFATE) 1 gram tablet Take 1 tablet (1 g total) by mouth 3 (three) times daily as needed (dyspepsia).  90 tablet 0    zolpidem (AMBIEN) 5 MG Tab Take 1 tablet (5 mg total) by mouth nightly as needed (Insomnia.). 10/26/2023: PRN 20 tablet 0    cephALEXin (KEFLEX) 500 MG capsule Take 1 capsule (500 mg total) by mouth every 12 (twelve) hours. (Patient not taking: Reported on 11/3/2023)  10 capsule 0    glimepiride (AMARYL) 2 MG tablet Take 1 tablet (2 mg total) by mouth daily with breakfast. for 10 doses  10 tablet 0    lipase-protease-amylase 24,000-76,000-120,000 units (CREON) 24,000-76,000 -120,000 unit capsule Take 2 capsules by mouth 4 (four) times daily with meals and nightly.  240 capsule 1    oxyCODONE-acetaminophen (PERCOCET) 5-325 mg per tablet Take 1 tablet by mouth every 6 (six) hours as needed.       [] promethazine-dextromethorphan (PROMETHAZINE-DM) 6.25-15 mg/5 mL Syrp Take 5 mLs by mouth every 8 (eight) hours as needed. (Patient not taking: Reported on 11/3/2023)  180 mL 0     Facility-Administered Encounter Medications as of 11/3/2023   Medication Dose Route Frequency Provider Last Rate Last Admin    0.9%  NaCl infusion   Intravenous Continuous Kristian Wakefield MD        sodium chloride 0.9% flush 10 mL  10 mL Intravenous PRN Kristian Wakefield MD            Review of patient's allergies indicates:   Allergen Reactions    Pineapple Swelling    Hydrochlorothiazide Other (See Comments)     CAUSING PANCREATITIS        Physical Exam      Vital Signs  Pulse: 101  SpO2: 98 %  BP: 118/84  BP Location: Right arm  Patient Position: Sitting  Pain Score: 0-No pain  Height and Weight  Height: 5' 3" (160 cm)  Weight: 72.2 kg (159 lb 2.8 oz)  BSA (Calculated - sq m): 1.79 sq meters  BMI (Calculated): 28.2  Weight in (lb) to have BMI = 25: 140.8    Physical " Exam  Constitutional:       Appearance: Normal appearance.   HENT:      Head: Normocephalic and atraumatic.      Mouth/Throat:      Mouth: Mucous membranes are dry.   Cardiovascular:      Rate and Rhythm: Regular rhythm. Tachycardia present.      Pulses: Normal pulses.      Heart sounds: Normal heart sounds.   Pulmonary:      Effort: Pulmonary effort is normal.      Breath sounds: Normal breath sounds.   Skin:     General: Skin is warm and dry.      Capillary Refill: Capillary refill takes less than 2 seconds.   Neurological:      General: No focal deficit present.      Mental Status: She is alert and oriented to person, place, and time.   Psychiatric:         Mood and Affect: Mood normal.         Behavior: Behavior normal.          Laboratory:  CBC:  Lab Results   Component Value Date    WBC 5.88 10/06/2022    RBC 3.70 (L) 10/06/2022    HGB 12.6 10/06/2022    HCT 36.5 (L) 10/06/2022     10/06/2022    MCV 99 (H) 10/06/2022    MCH 34.1 (H) 10/06/2022    MCHC 34.5 10/06/2022    MCHC 34.2 10/05/2022    MCHC 33.9 09/15/2022     CMP:  Lab Results   Component Value Date     (H) 11/03/2023    CALCIUM 9.9 11/03/2023    ALBUMIN 5.0 (H) 05/02/2023    PROT 8.0 01/23/2023     (L) 11/03/2023    K 3.8 11/03/2023    CO2 28 11/03/2023    CL 98 11/03/2023    BUN 10 11/03/2023    ALKPHOS 153 (H) 01/23/2023    ALT 70 (H) 05/02/2023    AST 41 (H) 05/02/2023    BILITOT 1.0 05/02/2023    BILITOT 0.5 01/23/2023    BILITOT 0.6 11/08/2022     URINALYSIS:  Lab Results   Component Value Date    COLORU Yellow 10/05/2022    SPECGRAV 1.015 10/05/2022    PHUR 5.0 10/05/2022    PROTEINUA 1+ (A) 10/05/2022    BACTERIA Rare 10/05/2022    NITRITE Negative 10/05/2022    LEUKOCYTESUR Negative 10/05/2022    UROBILINOGEN 0.2 09/02/2021    HYALINECASTS 48 (A) 10/05/2022    HYALINECASTS 2 (A) 09/03/2019      LIPIDS:  Lab Results   Component Value Date    TSH 0.926 07/15/2022    TSH 0.900 12/04/2020     (H) 07/15/2022    HDL 97  (H) 12/04/2020    CHOL 252 (H) 07/15/2022    CHOL 225 (H) 12/04/2020    TRIG 75 07/15/2022    TRIG 93 12/04/2020    LDLCALC 103.0 07/15/2022    LDLCALC 109.4 12/04/2020    CHOLHDL 53.2 (H) 07/15/2022    CHOLHDL 43.1 12/04/2020    NONHDLCHOL 118 07/15/2022    NONHDLCHOL 128 12/04/2020    TOTALCHOLEST 1.9 (L) 07/15/2022    TOTALCHOLEST 2.3 12/04/2020     TSH:  Lab Results   Component Value Date    TSH 0.926 07/15/2022    TSH 0.900 12/04/2020     A1C:  Lab Results   Component Value Date    HGBA1C 6.4 (H) 01/23/2023    HGBA1C 5.7 (H) 10/05/2022    HGBA1C 5.7 (H) 07/15/2022    HGBA1C 7.0 (H) 12/04/2020         Assessment/Plan     Joan Wetzel is a 53 y.o.female with:    POCT glucose: 298    Consulted patient's PCP about course of treatment.  We decided to give patient glimepiride 2 mg daily times 10 doses till blood sugar less than 150.  Once patient has blood glucose less than 150, she is to stop medication.  Patient verbalized understanding of all education.    1. Hyperglycemia, drug-induced  -     POCT Glucose, Hand-Held Device  -     BASIC METABOLIC PANEL; Future; Expected date: 11/03/2023  -     glimepiride (AMARYL) 2 MG tablet; Take 1 tablet (2 mg total) by mouth daily with breakfast. for 10 doses  Dispense: 10 tablet; Refill: 0      I spent 35 minutes on the day of this encounter for preparing for, evaluating, treating, and managing this patient.      -Continue current medications and maintain follow up with specialists.  Return to clinic in No follow-ups on file.       Dawn R McCloskey, NP [facility]    Portions of this note may have been generated using voice recognition software.  Please excuse any spelling/grammatical errors. Occasional wrong-word or sound-a-like substitutions may have also occurred due to the inherent limitations of voice recognition software. Please read the chart carefully and recognize, using context, where substitutions have  occurred.

## 2023-11-04 LAB
ANION GAP SERPL CALC-SCNC: 9 MMOL/L (ref 8–16)
BUN SERPL-MCNC: 10 MG/DL (ref 6–20)
CALCIUM SERPL-MCNC: 9.9 MG/DL (ref 8.7–10.5)
CHLORIDE SERPL-SCNC: 98 MMOL/L (ref 95–110)
CO2 SERPL-SCNC: 28 MMOL/L (ref 23–29)
CREAT SERPL-MCNC: 1 MG/DL (ref 0.5–1.4)
EST. GFR  (NO RACE VARIABLE): >60 ML/MIN/1.73 M^2
GLUCOSE SERPL-MCNC: 303 MG/DL (ref 70–110)
POTASSIUM SERPL-SCNC: 3.8 MMOL/L (ref 3.5–5.1)
SODIUM SERPL-SCNC: 135 MMOL/L (ref 136–145)

## 2023-11-06 ENCOUNTER — PATIENT MESSAGE (OUTPATIENT)
Dept: PSYCHIATRY | Facility: CLINIC | Age: 53
End: 2023-11-06
Payer: COMMERCIAL

## 2023-11-08 ENCOUNTER — TELEPHONE (OUTPATIENT)
Dept: PRIMARY CARE CLINIC | Facility: CLINIC | Age: 53
End: 2023-11-08
Payer: COMMERCIAL

## 2023-11-08 NOTE — TELEPHONE ENCOUNTER
Called ptCORNELL to see how she is feeling, and if there has been any improvement in her blood glucose levels.  Waiting for call back.

## 2023-11-21 ENCOUNTER — LAB VISIT (OUTPATIENT)
Dept: LAB | Facility: HOSPITAL | Age: 53
End: 2023-11-21
Attending: INTERNAL MEDICINE
Payer: COMMERCIAL

## 2023-11-21 ENCOUNTER — OFFICE VISIT (OUTPATIENT)
Dept: PRIMARY CARE CLINIC | Facility: CLINIC | Age: 53
End: 2023-11-21
Payer: COMMERCIAL

## 2023-11-21 VITALS
HEART RATE: 98 BPM | DIASTOLIC BLOOD PRESSURE: 84 MMHG | BODY MASS INDEX: 28.03 KG/M2 | OXYGEN SATURATION: 98 % | SYSTOLIC BLOOD PRESSURE: 140 MMHG | WEIGHT: 158.19 LBS | HEIGHT: 63 IN | TEMPERATURE: 98 F

## 2023-11-21 DIAGNOSIS — E55.9 VITAMIN D DEFICIENCY: ICD-10-CM

## 2023-11-21 DIAGNOSIS — E87.6 HYPOKALEMIA: ICD-10-CM

## 2023-11-21 DIAGNOSIS — Z00.00 ROUTINE MEDICAL EXAM: Primary | ICD-10-CM

## 2023-11-21 DIAGNOSIS — E78.5 HYPERLIPIDEMIA ASSOCIATED WITH TYPE 2 DIABETES MELLITUS: ICD-10-CM

## 2023-11-21 DIAGNOSIS — M54.42 ACUTE BACK PAIN WITH SCIATICA, LEFT: ICD-10-CM

## 2023-11-21 DIAGNOSIS — Z23 NEED FOR COVID-19 VACCINE: ICD-10-CM

## 2023-11-21 DIAGNOSIS — Z12.4 CERVICAL CANCER SCREENING: ICD-10-CM

## 2023-11-21 DIAGNOSIS — Z23 INFLUENZA VACCINE NEEDED: ICD-10-CM

## 2023-11-21 DIAGNOSIS — I10 PRIMARY HYPERTENSION: ICD-10-CM

## 2023-11-21 DIAGNOSIS — E11.69 HYPERLIPIDEMIA ASSOCIATED WITH TYPE 2 DIABETES MELLITUS: ICD-10-CM

## 2023-11-21 DIAGNOSIS — F51.04 PSYCHOPHYSIOLOGICAL INSOMNIA: ICD-10-CM

## 2023-11-21 DIAGNOSIS — K21.9 GASTROESOPHAGEAL REFLUX DISEASE, UNSPECIFIED WHETHER ESOPHAGITIS PRESENT: ICD-10-CM

## 2023-11-21 DIAGNOSIS — Z12.31 ENCOUNTER FOR SCREENING MAMMOGRAM FOR BREAST CANCER: ICD-10-CM

## 2023-11-21 DIAGNOSIS — E11.9 TYPE 2 DIABETES MELLITUS WITHOUT COMPLICATION, WITHOUT LONG-TERM CURRENT USE OF INSULIN: ICD-10-CM

## 2023-11-21 DIAGNOSIS — Z00.00 ROUTINE MEDICAL EXAM: ICD-10-CM

## 2023-11-21 LAB
25(OH)D3+25(OH)D2 SERPL-MCNC: 17 NG/ML (ref 30–96)
ALBUMIN SERPL BCP-MCNC: 4.3 G/DL (ref 3.5–5.2)
ALP SERPL-CCNC: 196 U/L (ref 55–135)
ALT SERPL W/O P-5'-P-CCNC: 51 U/L (ref 10–44)
ANION GAP SERPL CALC-SCNC: 13 MMOL/L (ref 8–16)
AST SERPL-CCNC: 65 U/L (ref 10–40)
BILIRUB SERPL-MCNC: 0.4 MG/DL (ref 0.1–1)
BUN SERPL-MCNC: 7 MG/DL (ref 6–20)
CALCIUM SERPL-MCNC: 10.1 MG/DL (ref 8.7–10.5)
CHLORIDE SERPL-SCNC: 104 MMOL/L (ref 95–110)
CHOLEST SERPL-MCNC: 207 MG/DL (ref 120–199)
CHOLEST/HDLC SERPL: 2.1 {RATIO} (ref 2–5)
CO2 SERPL-SCNC: 26 MMOL/L (ref 23–29)
CREAT SERPL-MCNC: 0.8 MG/DL (ref 0.5–1.4)
ERYTHROCYTE [DISTWIDTH] IN BLOOD BY AUTOMATED COUNT: 12.6 % (ref 11.5–14.5)
EST. GFR  (NO RACE VARIABLE): >60 ML/MIN/1.73 M^2
ESTIMATED AVG GLUCOSE: 197 MG/DL (ref 68–131)
GLUCOSE SERPL-MCNC: 140 MG/DL (ref 70–110)
HBA1C MFR BLD: 8.5 % (ref 4–5.6)
HCT VFR BLD AUTO: 41 % (ref 37–48.5)
HDLC SERPL-MCNC: 99 MG/DL (ref 40–75)
HDLC SERPL: 47.8 % (ref 20–50)
HGB BLD-MCNC: 13.7 G/DL (ref 12–16)
LDLC SERPL CALC-MCNC: 95.4 MG/DL (ref 63–159)
MAGNESIUM SERPL-MCNC: 1.5 MG/DL (ref 1.6–2.6)
MCH RBC QN AUTO: 33.4 PG (ref 27–31)
MCHC RBC AUTO-ENTMCNC: 33.4 G/DL (ref 32–36)
MCV RBC AUTO: 100 FL (ref 82–98)
NONHDLC SERPL-MCNC: 108 MG/DL
PLATELET # BLD AUTO: 300 K/UL (ref 150–450)
PMV BLD AUTO: 11 FL (ref 9.2–12.9)
POTASSIUM SERPL-SCNC: 4.4 MMOL/L (ref 3.5–5.1)
PROT SERPL-MCNC: 7.9 G/DL (ref 6–8.4)
RBC # BLD AUTO: 4.1 M/UL (ref 4–5.4)
SODIUM SERPL-SCNC: 143 MMOL/L (ref 136–145)
TRIGL SERPL-MCNC: 63 MG/DL (ref 30–150)
WBC # BLD AUTO: 3.82 K/UL (ref 3.9–12.7)

## 2023-11-21 PROCEDURE — 3061F NEG MICROALBUMINURIA REV: CPT | Mod: CPTII,S$GLB,, | Performed by: INTERNAL MEDICINE

## 2023-11-21 PROCEDURE — 83735 ASSAY OF MAGNESIUM: CPT | Performed by: INTERNAL MEDICINE

## 2023-11-21 PROCEDURE — 3061F PR NEG MICROALBUMINURIA RESULT DOCUMENTED/REVIEW: ICD-10-PCS | Mod: CPTII,S$GLB,, | Performed by: INTERNAL MEDICINE

## 2023-11-21 PROCEDURE — 99999 PR PBB SHADOW E&M-EST. PATIENT-LVL III: ICD-10-PCS | Mod: PBBFAC,,, | Performed by: INTERNAL MEDICINE

## 2023-11-21 PROCEDURE — 85027 COMPLETE CBC AUTOMATED: CPT | Performed by: INTERNAL MEDICINE

## 2023-11-21 PROCEDURE — 3044F HG A1C LEVEL LT 7.0%: CPT | Mod: CPTII,S$GLB,, | Performed by: INTERNAL MEDICINE

## 2023-11-21 PROCEDURE — 1160F PR REVIEW ALL MEDS BY PRESCRIBER/CLIN PHARMACIST DOCUMENTED: ICD-10-PCS | Mod: CPTII,S$GLB,, | Performed by: INTERNAL MEDICINE

## 2023-11-21 PROCEDURE — 1159F MED LIST DOCD IN RCRD: CPT | Mod: CPTII,S$GLB,, | Performed by: INTERNAL MEDICINE

## 2023-11-21 PROCEDURE — 3077F SYST BP >= 140 MM HG: CPT | Mod: CPTII,S$GLB,, | Performed by: INTERNAL MEDICINE

## 2023-11-21 PROCEDURE — 36415 COLL VENOUS BLD VENIPUNCTURE: CPT | Mod: PN | Performed by: INTERNAL MEDICINE

## 2023-11-21 PROCEDURE — 3044F PR MOST RECENT HEMOGLOBIN A1C LEVEL <7.0%: ICD-10-PCS | Mod: CPTII,S$GLB,, | Performed by: INTERNAL MEDICINE

## 2023-11-21 PROCEDURE — 99999 PR PBB SHADOW E&M-EST. PATIENT-LVL III: CPT | Mod: PBBFAC,,, | Performed by: INTERNAL MEDICINE

## 2023-11-21 PROCEDURE — 3077F PR MOST RECENT SYSTOLIC BLOOD PRESSURE >= 140 MM HG: ICD-10-PCS | Mod: CPTII,S$GLB,, | Performed by: INTERNAL MEDICINE

## 2023-11-21 PROCEDURE — 99396 PR PREVENTIVE VISIT,EST,40-64: ICD-10-PCS | Mod: S$GLB,,, | Performed by: INTERNAL MEDICINE

## 2023-11-21 PROCEDURE — 3066F NEPHROPATHY DOC TX: CPT | Mod: CPTII,S$GLB,, | Performed by: INTERNAL MEDICINE

## 2023-11-21 PROCEDURE — 3008F PR BODY MASS INDEX (BMI) DOCUMENTED: ICD-10-PCS | Mod: CPTII,S$GLB,, | Performed by: INTERNAL MEDICINE

## 2023-11-21 PROCEDURE — 1160F RVW MEDS BY RX/DR IN RCRD: CPT | Mod: CPTII,S$GLB,, | Performed by: INTERNAL MEDICINE

## 2023-11-21 PROCEDURE — 3079F PR MOST RECENT DIASTOLIC BLOOD PRESSURE 80-89 MM HG: ICD-10-PCS | Mod: CPTII,S$GLB,, | Performed by: INTERNAL MEDICINE

## 2023-11-21 PROCEDURE — 3066F PR DOCUMENTATION OF TREATMENT FOR NEPHROPATHY: ICD-10-PCS | Mod: CPTII,S$GLB,, | Performed by: INTERNAL MEDICINE

## 2023-11-21 PROCEDURE — 3079F DIAST BP 80-89 MM HG: CPT | Mod: CPTII,S$GLB,, | Performed by: INTERNAL MEDICINE

## 2023-11-21 PROCEDURE — 83036 HEMOGLOBIN GLYCOSYLATED A1C: CPT | Performed by: INTERNAL MEDICINE

## 2023-11-21 PROCEDURE — 80053 COMPREHEN METABOLIC PANEL: CPT | Performed by: INTERNAL MEDICINE

## 2023-11-21 PROCEDURE — 3008F BODY MASS INDEX DOCD: CPT | Mod: CPTII,S$GLB,, | Performed by: INTERNAL MEDICINE

## 2023-11-21 PROCEDURE — 80061 LIPID PANEL: CPT | Performed by: INTERNAL MEDICINE

## 2023-11-21 PROCEDURE — 99396 PREV VISIT EST AGE 40-64: CPT | Mod: S$GLB,,, | Performed by: INTERNAL MEDICINE

## 2023-11-21 PROCEDURE — 82306 VITAMIN D 25 HYDROXY: CPT | Performed by: INTERNAL MEDICINE

## 2023-11-21 PROCEDURE — 1159F PR MEDICATION LIST DOCUMENTED IN MEDICAL RECORD: ICD-10-PCS | Mod: CPTII,S$GLB,, | Performed by: INTERNAL MEDICINE

## 2023-11-21 RX ORDER — ZOLPIDEM TARTRATE 5 MG/1
5 TABLET ORAL NIGHTLY PRN
Qty: 20 TABLET | Refills: 0 | Status: SHIPPED | OUTPATIENT
Start: 2023-11-21 | End: 2024-02-19 | Stop reason: SDUPTHER

## 2023-11-21 RX ORDER — GABAPENTIN 300 MG/1
300 CAPSULE ORAL NIGHTLY
Qty: 30 CAPSULE | Refills: 2 | Status: SHIPPED | OUTPATIENT
Start: 2023-11-21 | End: 2024-02-19 | Stop reason: SDUPTHER

## 2023-11-21 RX ORDER — ATORVASTATIN CALCIUM 20 MG/1
20 TABLET, FILM COATED ORAL DAILY
Qty: 90 TABLET | Refills: 3 | Status: SHIPPED | OUTPATIENT
Start: 2023-11-21 | End: 2024-11-20

## 2023-11-21 RX ORDER — AMLODIPINE BESYLATE 10 MG/1
10 TABLET ORAL DAILY
Qty: 90 TABLET | Refills: 2 | Status: SHIPPED | OUTPATIENT
Start: 2023-11-21

## 2023-11-21 NOTE — LETTER
November 21, 2023      [facility]  [address]  [facility] [address]  Phone: [phone]  Fax: [phone]       Patient: Joan Wetzel   YOB: 1970  Date of Visit: 11/21/2023    To Whom It May Concern:    Aidan Wetzel  was at [facility] on 11/21/2023. The patient may return to work/school on today, 11/21/23 with no restrictions. If you have any questions or concerns, or if I can be of further assistance, please do not hesitate to contact me.    Sincerely,        Melissa Neely MD

## 2023-11-21 NOTE — PROGRESS NOTES
Subjective     Patient ID: Joan Wetzel is a 53 y.o. female.    Chief Complaint: Annual Exam    Last seen by virtual visit 7 months ago for f/u depression/anxiety. Zoloft was changed to Lexapro at that time due to side effects. Returns for annual physical off Lexapro x 2 months and doing okay. She is moving into a new home and feels very happy about that. Noted ER visit for recurrent pancreatitis in May, this has not recurred. Urgent visit with another provider here three weeks ago for acutely elevated blood glucose >500 at home after taking Prednisone for a cold. Glucose was 300 in the office, she was not sent to the ER. Low dose Glimepiride prescribed for a few days which helped get it back down. Home glucose ranges 120's fasting per history (no log).     PMH:  Hypertension.  Diabetes Type 2, HbA1c 6.4% Jan. '23.  Hyperlipidemia,  July '22.  Pancreatitis, pancreatic insufficiency.  Vitamin D insufficiency.  GERD.    PSH: reviewed.     Pap normal 11/20. Mammogram normal 1/23. Colonoscopy 3/21 - diverticulosis. Eye exam 3/21 at [facility], since? No Podiatrist. Vaccines reviewed.     Social: former tobacco use, alcohol 1 a week.  , 3 adult children, lives alone.  of an apartment complex.    FMH: HTN, DM, Stroke, Heart dis, Stomach cancer, Lung cancer, Alcohol abuse, Dementia.    Allergies: HCTZ, pineapple.     Medications: list reviewed and reconciled. Uses Ambien sparingly. Admits non-compliance with Atorvastatin.    Review of Systems   Constitutional:  Negative for activity change, appetite change, fatigue, fever and unexpected weight change.   HENT:  Negative for nasal congestion, ear pain, hearing loss, rhinorrhea, sneezing, sore throat, trouble swallowing and voice change.    Eyes:  Negative for pain and visual disturbance.   Respiratory:  Negative for cough, chest tightness, shortness of breath and wheezing.    Cardiovascular:  Negative for chest pain, palpitations and leg  "swelling.   Gastrointestinal:  Negative for abdominal pain, blood in stool, constipation, diarrhea, nausea and vomiting.   Genitourinary:  Negative for dysuria, frequency, pelvic pain and vaginal bleeding.   Musculoskeletal:  Positive for leg pain. Negative for arthralgias, gait problem, joint swelling and myalgias.        Acute onset non-traumatic left leg pain radiating from the buttock down to left foot since she started packing for the move/heavy lifting. No leg weakness, numbness or tingling. Using Tylenol and Ibuprofen with some relief.    Integumentary:  Negative for color change and rash.   Neurological:  Negative for dizziness, syncope, facial asymmetry, speech difficulty, weakness, numbness and headaches.   Hematological:  Negative for adenopathy. Does not bruise/bleed easily.   Psychiatric/Behavioral:  Negative for confusion and dysphoric mood. The patient is not nervous/anxious.           Objective   Vitals:    11/21/23 0844   BP: (!) 140/84   Pulse: 98   Temp: 97.8 °F (36.6 °C)   SpO2: 98%   Weight: 71.8 kg (158 lb 3.2 oz)   Height: 5' 3" (1.6 m)   BMI=28  Physical Exam  Vitals reviewed.   Constitutional:       General: She is not in acute distress.     Appearance: She is well-developed. She is not ill-appearing or diaphoretic.   HENT:      Head: Normocephalic and atraumatic.      Right Ear: Tympanic membrane and ear canal normal.      Left Ear: Tympanic membrane and ear canal normal.      Nose: Nose normal. No congestion.      Mouth/Throat:      Mouth: Mucous membranes are moist.      Pharynx: Oropharynx is clear.   Eyes:      General: No scleral icterus.     Extraocular Movements: Extraocular movements intact.      Conjunctiva/sclera: Conjunctivae normal.      Right eye: Right conjunctiva is not injected.      Left eye: Left conjunctiva is not injected.      Pupils: Pupils are equal, round, and reactive to light.   Neck:      Thyroid: No thyromegaly.      Vascular: No carotid bruit or JVD. "   Cardiovascular:      Rate and Rhythm: Normal rate and regular rhythm.      Pulses: Normal pulses.      Heart sounds: Normal heart sounds. No murmur heard.     No friction rub. No gallop.   Pulmonary:      Effort: Pulmonary effort is normal. No respiratory distress.      Breath sounds: Normal breath sounds. No wheezing, rhonchi or rales.   Abdominal:      General: Bowel sounds are normal. There is no distension.      Palpations: Abdomen is soft. There is no mass.      Tenderness: There is no abdominal tenderness.   Musculoskeletal:         General: No tenderness or deformity. Normal range of motion.      Cervical back: Normal range of motion and neck supple.      Right lower leg: No edema.      Left lower leg: No edema.      Comments: No point tenderness along the spine, no spinal deformity. Some pain in the left hip joint on range of motion.    Lymphadenopathy:      Cervical: No cervical adenopathy.   Skin:     General: Skin is warm and dry.      Coloration: Skin is not pale.      Findings: No erythema or rash.      Nails: There is no clubbing.   Neurological:      General: No focal deficit present.      Mental Status: She is alert and oriented to person, place, and time.      Cranial Nerves: No cranial nerve deficit.      Motor: No weakness or abnormal muscle tone.      Coordination: Coordination normal.      Gait: Gait normal.   Psychiatric:         Mood and Affect: Mood and affect normal.         Speech: Speech normal.         Behavior: Behavior normal.         Thought Content: Thought content normal.         Judgment: Judgment normal.     Had hypokalemia in May, normal potassium 3.8 on recent BMP.     Assessment and Plan     1. Routine medical exam  -     CBC Without Differential; Future; Expected date: 11/21/2023  -     Comprehensive Metabolic Panel; Future; Expected date: 11/21/2023  -     Lipid Panel; Future; Expected date: 11/21/2023    2. Type 2 diabetes mellitus without complication, without long-term  current use of insulin  -     Hemoglobin A1C; Future; Expected date: 11/21/2023  -     Metformin order upon review of labs.     3. Primary hypertension  -     amLODIPine (NORVASC) 10 MG tablet; Take 1 tablet (10 mg total) by mouth once daily.  Dispense: 90 tablet; Refill: 2  -     home monitoring encouraged, call if freq >130/80, consider ACE/ARB.     4. Hyperlipidemia associated with type 2 diabetes mellitus  -     atorvastatin (LIPITOR) 20 MG tablet; Take 1 tablet (20 mg total) by mouth once daily.  Dispense: 90 tablet; Refill: 3    5. Gastroesophageal reflux disease, unspecified whether esophagitis present        -     stable on Pantoprazole, refills available.     6. Psychophysiological insomnia  -     zolpidem (AMBIEN) 5 MG Tab; Take 1 tablet (5 mg total) by mouth nightly as needed (Insomnia.).  Dispense: 20 tablet; Refill: 0    7. Acute back pain with sciatica, left  -     gabapentin (NEURONTIN) 300 MG capsule; Take 1 capsule (300 mg total) by mouth every evening. For nerve pain.  Dispense: 30 capsule; Refill: 2    8. Vitamin D deficiency  -     Vitamin D; Future; Expected date: 11/21/2023    9. Hypokalemia  -     Magnesium; Future; Expected date: 11/21/2023    10. Encounter for screening mammogram for breast cancer        -     Mammogram is ordered, due in January.     11. Cervical cancer screening        -     [facility] visit to be scheduled by patient.     12. Influenza vaccine needed - Flu shot today.     13. Need for COVID-19 vaccine - COVID booster today.        Follow up in about 6 months (around 5/21/2024).

## 2023-11-26 ENCOUNTER — PATIENT MESSAGE (OUTPATIENT)
Dept: PRIMARY CARE CLINIC | Facility: CLINIC | Age: 53
End: 2023-11-26
Payer: COMMERCIAL

## 2023-11-26 DIAGNOSIS — E55.9 VITAMIN D DEFICIENCY: ICD-10-CM

## 2023-11-26 DIAGNOSIS — E11.9 TYPE 2 DIABETES MELLITUS WITHOUT COMPLICATION, WITHOUT LONG-TERM CURRENT USE OF INSULIN: ICD-10-CM

## 2023-11-26 DIAGNOSIS — R74.8 ELEVATED LIVER ENZYMES: ICD-10-CM

## 2023-11-26 DIAGNOSIS — E83.42 HYPOMAGNESEMIA: Primary | ICD-10-CM

## 2023-11-26 RX ORDER — CALCIUM CARBONATE 300MG(750)
400 TABLET,CHEWABLE ORAL DAILY
Qty: 30 TABLET | Refills: 0 | Status: SHIPPED | OUTPATIENT
Start: 2023-11-26

## 2023-11-26 RX ORDER — ERGOCALCIFEROL 1.25 MG/1
50000 CAPSULE ORAL
Qty: 12 CAPSULE | Refills: 3 | Status: SHIPPED | OUTPATIENT
Start: 2023-11-26

## 2023-11-26 RX ORDER — METFORMIN HYDROCHLORIDE 500 MG/1
TABLET ORAL
Qty: 270 TABLET | Refills: 1 | Status: SHIPPED | OUTPATIENT
Start: 2023-11-26

## 2023-11-27 NOTE — TELEPHONE ENCOUNTER
Repeat labs for diabetes and liver functions in 3 months, virtual visit for results review. This can not wait for her follow up office visit in May.

## 2024-02-15 ENCOUNTER — PATIENT OUTREACH (OUTPATIENT)
Dept: ADMINISTRATIVE | Facility: HOSPITAL | Age: 54
End: 2024-02-15
Payer: COMMERCIAL

## 2024-02-15 NOTE — PROGRESS NOTES
Health Maintenance Due   Topic Date Due    Pneumococcal Vaccines (Age 0-64) (1 of 2 - PCV) Never done    Shingles Vaccine (1 of 2) Never done    Foot Exam  03/31/2023    Mammogram  01/05/2024    Diabetes Urine Screening  01/23/2024    Eye Exam  02/28/2024        Chart review done.    updated.   Immunizations reviewed & updated.   Care Everywhere updated  DIS reviewed

## 2024-02-19 DIAGNOSIS — E55.9 VITAMIN D DEFICIENCY: ICD-10-CM

## 2024-02-19 DIAGNOSIS — F51.04 PSYCHOPHYSIOLOGICAL INSOMNIA: ICD-10-CM

## 2024-02-19 DIAGNOSIS — M54.42 ACUTE BACK PAIN WITH SCIATICA, LEFT: ICD-10-CM

## 2024-02-19 NOTE — TELEPHONE ENCOUNTER
No care due was identified.  Health [facility] Embedded Care Due Messages. Reference number: [id number].   2/19/2024 9:06:19 AM CST

## 2024-02-21 RX ORDER — ERGOCALCIFEROL 1.25 MG/1
50000 CAPSULE ORAL
Qty: 12 CAPSULE | Refills: 3 | OUTPATIENT
Start: 2024-02-21

## 2024-02-21 RX ORDER — GABAPENTIN 300 MG/1
300 CAPSULE ORAL NIGHTLY
Qty: 90 CAPSULE | Refills: 1 | Status: SHIPPED | OUTPATIENT
Start: 2024-02-21

## 2024-02-21 RX ORDER — ZOLPIDEM TARTRATE 5 MG/1
5 TABLET ORAL NIGHTLY PRN
Qty: 20 TABLET | Refills: 0 | Status: SHIPPED | OUTPATIENT
Start: 2024-02-21 | End: 2024-05-22 | Stop reason: SDUPTHER

## 2024-02-29 ENCOUNTER — TELEPHONE (OUTPATIENT)
Dept: PRIMARY CARE CLINIC | Facility: CLINIC | Age: 54
End: 2024-02-29
Payer: COMMERCIAL

## 2024-02-29 ENCOUNTER — OFFICE VISIT (OUTPATIENT)
Dept: PRIMARY CARE CLINIC | Facility: CLINIC | Age: 54
End: 2024-02-29
Payer: COMMERCIAL

## 2024-02-29 DIAGNOSIS — E78.5 HYPERLIPIDEMIA ASSOCIATED WITH TYPE 2 DIABETES MELLITUS: ICD-10-CM

## 2024-02-29 DIAGNOSIS — J06.9 ACUTE UPPER RESPIRATORY INFECTION: ICD-10-CM

## 2024-02-29 DIAGNOSIS — E11.9 TYPE 2 DIABETES MELLITUS WITHOUT COMPLICATION, WITHOUT LONG-TERM CURRENT USE OF INSULIN: Primary | ICD-10-CM

## 2024-02-29 DIAGNOSIS — E11.69 HYPERLIPIDEMIA ASSOCIATED WITH TYPE 2 DIABETES MELLITUS: ICD-10-CM

## 2024-02-29 DIAGNOSIS — K76.0 FATTY LIVER: ICD-10-CM

## 2024-02-29 DIAGNOSIS — Z12.31 ENCOUNTER FOR SCREENING MAMMOGRAM FOR BREAST CANCER: ICD-10-CM

## 2024-02-29 PROCEDURE — 1159F MED LIST DOCD IN RCRD: CPT | Mod: CPTII,95,, | Performed by: INTERNAL MEDICINE

## 2024-02-29 PROCEDURE — 1160F RVW MEDS BY RX/DR IN RCRD: CPT | Mod: CPTII,95,, | Performed by: INTERNAL MEDICINE

## 2024-02-29 PROCEDURE — 99214 OFFICE O/P EST MOD 30 MIN: CPT | Mod: 95,,, | Performed by: INTERNAL MEDICINE

## 2024-02-29 NOTE — LETTER
February 29, 2024      [facility]  [address]  [facility] [address]  Phone: [phone]  Fax: [phone]       Patient: Joan Wetzel   YOB: 1970  Date of Visit: 02/29/2024    To Whom It May Concern:    Aidan Wetzel  was at [facility] on 02/29/2024. The patient may return to work/school on Monday 03/04/24 with no restrictions. If you have any questions or concerns, or if I can be of further assistance, please do not hesitate to contact me.    Sincerely,        Melissa Neely MD

## 2024-03-03 NOTE — PROGRESS NOTES
Subjective     Patient ID: Joan Wetzel is a 53 y.o. female.    Chief Complaint: Follow-up    The patient location is: [place]  The chief complaint leading to consultation is: Follow Up    Visit type: audiovisual    Face to Face time with patient: 12 minutes.  20 minutes of total time spent on the encounter, which includes face to face time and non-face to face time preparing to see the patient (eg, review of tests), Obtaining and/or reviewing separately obtained history, Documenting clinical information in the electronic or other health record, Independently interpreting results (not separately reported) and communicating results to the patient/family/caregiver, or Care coordination (not separately reported).     Each patient to whom he or she provides medical services by telemedicine is:  (1) informed of the relationship between the physician and patient and the respective role of any other health care provider with respect to management of the patient; and (2) notified that he or she may decline to receive medical services by telemedicine and may withdraw from such care at any time.    Notes:   Last seen 3 months ago with newly uncontrolled diabetes, HbA1c up from 6.4 to 8.5, and mildly elevated liver enzymes for the second time. Scheduled today for follow up with labs ordered in advance, but she missed her lab appointment. Reports compliance with meds, Glucose was 180 at home this morning. Regarding liver, enzyme elevation is <100, CT scan 11/22 reports fatty liver.   Currently c/o upper respiratory symptoms beginning three days ago, COVID negative at home twice. No cough or chest discomfort, no N/V or diarrhea. Has been drinking a lot of orange juice.     PMH:  Hypertension.  Diabetes Type 2, HbA1c 8.5% (from 6.4) Nov. '23.  Hyperlipidemia, LDL 95 Nov. '23.  Pancreatitis, pancreatic insufficiency.  Vitamin D insufficiency.  GERD.    PSH: reviewed.     Pap normal 11/20. Mammogram normal 1/23. Colonoscopy  3/21 - diverticulosis. Eye exam 3/21 at [facility], since? No Podiatrist. Vaccines reviewed.     Social: former tobacco use, alcohol 1 a week.  , 3 adult children, lives alone.  of an apartment complex.    FMH: HTN, DM, Stroke, Heart dis, Stomach cancer, Lung cancer, Alcohol abuse, Dementia.    Allergies: HCTZ, pineapple.     Medications: list reviewed and reconciled.     Diabetes  She has type 2 diabetes mellitus. No MedicAlert identification noted. The initial diagnosis of diabetes was made 2 years ago. Hypoglycemia symptoms include mood changes, nervousness/anxiousness and sweats. Pertinent negatives for hypoglycemia include no confusion, dizziness, headaches, hunger, pallor, seizures, sleepiness, speech difficulty or tremors. Associated symptoms include blurred vision, fatigue and foot paresthesias. Pertinent negatives for diabetes include no chest pain, no foot ulcerations, no polydipsia, no polyphagia, no polyuria, no visual change, no weakness and no weight loss. Pertinent negatives for hypoglycemia complications include no blackouts, no hospitalization, no nocturnal hypoglycemia, no required assistance and no required glucagon injection. Symptoms are stable. Diabetic complications include autonomic neuropathy, peripheral neuropathy and PVD. Pertinent negatives for diabetic complications include no CVA, heart disease, nephropathy or retinopathy. Risk factors for coronary artery disease include hypertension and stress. Current diabetic treatment includes diet and oral agent (monotherapy). She is compliant with treatment most of the time. Her weight is stable. She is following a generally healthy diet. Meal planning includes avoidance of concentrated sweets. She has not had a previous visit with a dietitian. She participates in exercise three times a week. She monitors blood glucose at home 1-2 x per week. She monitors urine at home <1 x per month. Blood glucose monitoring compliance  is fair. Her home blood glucose trend is fluctuating minimally. She does not see a podiatrist.Eye exam is not current.   Follow-up  Associated symptoms include congestion and fatigue. Pertinent negatives include no abdominal pain, chest pain, chills, coughing, fever, headaches, nausea, visual change, vomiting or weakness.     Review of Systems   Constitutional:  Positive for fatigue. Negative for chills, fever and weight loss.   HENT:  Positive for nasal congestion and rhinorrhea.    Eyes:  Positive for blurred vision.   Respiratory:  Negative for cough and shortness of breath.    Cardiovascular:  Negative for chest pain and palpitations.   Gastrointestinal:  Negative for abdominal pain, diarrhea, nausea and vomiting.   Endocrine: Negative for polydipsia, polyphagia and polyuria.   Integumentary:  Negative for pallor.   Neurological:  Negative for dizziness, tremors, seizures, speech difficulty, weakness and headaches.   Psychiatric/Behavioral:  Negative for confusion. The patient is nervous/anxious.           Objective     Physical Exam  Constitutional:       General: She is not in acute distress.  Pulmonary:      Effort: Pulmonary effort is normal. No respiratory distress.   Neurological:      Mental Status: She is alert.   Psychiatric:         Mood and Affect: Mood normal.         Behavior: Behavior normal.          Assessment and Plan   Type 2 diabetes mellitus without complication, without long-term current use of insulin       -    reschedule HbA1c, results/recommendations by portal message.     Hyperlipidemia associated with type 2 diabetes mellitus       -    controlled on Atorvastatin, encouraged compliance with daily dosing.    Fatty liver       -     reschedule Hepatic Function Panel and GGT.    Acute upper respiratory infection       -     mild, OTC meds as needed for symptoms.     Encounter for screening mammogram for breast cancer       -     Mammogram due now, is ordered and scheduled.        No  follow-ups on file.

## 2024-03-08 ENCOUNTER — TELEPHONE (OUTPATIENT)
Dept: PRIMARY CARE CLINIC | Facility: CLINIC | Age: 54
End: 2024-03-08
Payer: COMMERCIAL

## 2024-03-08 ENCOUNTER — LAB VISIT (OUTPATIENT)
Dept: LAB | Facility: HOSPITAL | Age: 54
End: 2024-03-08
Attending: INTERNAL MEDICINE
Payer: COMMERCIAL

## 2024-03-08 DIAGNOSIS — E11.9 TYPE 2 DIABETES MELLITUS WITHOUT COMPLICATION, WITHOUT LONG-TERM CURRENT USE OF INSULIN: ICD-10-CM

## 2024-03-08 DIAGNOSIS — R74.8 ELEVATED LIVER ENZYMES: ICD-10-CM

## 2024-03-08 DIAGNOSIS — E11.9 TYPE 2 DIABETES MELLITUS WITHOUT COMPLICATION: ICD-10-CM

## 2024-03-08 LAB
ALBUMIN SERPL BCP-MCNC: 3.9 G/DL (ref 3.5–5.2)
ALP SERPL-CCNC: 186 U/L (ref 55–135)
ALT SERPL W/O P-5'-P-CCNC: 95 U/L (ref 10–44)
AST SERPL-CCNC: 128 U/L (ref 10–40)
BILIRUB DIRECT SERPL-MCNC: 0.2 MG/DL (ref 0.1–0.3)
BILIRUB SERPL-MCNC: 0.3 MG/DL (ref 0.1–1)
CHOLEST SERPL-MCNC: 181 MG/DL (ref 120–199)
CHOLEST/HDLC SERPL: 2 {RATIO} (ref 2–5)
ESTIMATED AVG GLUCOSE: 220 MG/DL (ref 68–131)
GGT SERPL-CCNC: 724 U/L (ref 8–55)
HBA1C MFR BLD: 9.3 % (ref 4–5.6)
HDLC SERPL-MCNC: 89 MG/DL (ref 40–75)
HDLC SERPL: 49.2 % (ref 20–50)
LDLC SERPL CALC-MCNC: 65.8 MG/DL (ref 63–159)
NONHDLC SERPL-MCNC: 92 MG/DL
PROT SERPL-MCNC: 7.4 G/DL (ref 6–8.4)
TRIGL SERPL-MCNC: 131 MG/DL (ref 30–150)

## 2024-03-08 PROCEDURE — 83036 HEMOGLOBIN GLYCOSYLATED A1C: CPT | Performed by: INTERNAL MEDICINE

## 2024-03-08 PROCEDURE — 80076 HEPATIC FUNCTION PANEL: CPT | Performed by: INTERNAL MEDICINE

## 2024-03-08 PROCEDURE — 36415 COLL VENOUS BLD VENIPUNCTURE: CPT | Mod: PN | Performed by: INTERNAL MEDICINE

## 2024-03-08 PROCEDURE — 80061 LIPID PANEL: CPT | Performed by: INTERNAL MEDICINE

## 2024-03-08 PROCEDURE — 82977 ASSAY OF GGT: CPT | Performed by: INTERNAL MEDICINE

## 2024-03-08 NOTE — TELEPHONE ENCOUNTER
Spoke With Pt would like a Covid antibody test added to her labs today   Lab draw A gold Top if you would like to order it     Pt said she been feeling bad Cough,fever and sore throat    Pt said she tested herself at Home for Covid it was Neg  Pt didn't do a Flu test    Please Advised

## 2024-03-08 NOTE — TELEPHONE ENCOUNTER
----- Message from Halie Angel sent at 3/8/2024  2:51 PM CST -----  Regarding: added lab  Good afternoon,     Patient would like a Covid antibody test added to her labs. If you choose to order it, please link it to today's lab appointment.     Thank you

## 2024-03-10 ENCOUNTER — PATIENT MESSAGE (OUTPATIENT)
Dept: PRIMARY CARE CLINIC | Facility: CLINIC | Age: 54
End: 2024-03-10
Payer: COMMERCIAL

## 2024-03-10 DIAGNOSIS — R74.8 ELEVATED LIVER ENZYMES: Primary | ICD-10-CM

## 2024-03-11 NOTE — TELEPHONE ENCOUNTER
Ultrasound for persistently elevated liver enzymes. Hepatology referral (not Gastroenterology as I mentioned in portal message).

## 2024-03-13 ENCOUNTER — PATIENT MESSAGE (OUTPATIENT)
Dept: PRIMARY CARE CLINIC | Facility: CLINIC | Age: 54
End: 2024-03-13
Payer: COMMERCIAL

## 2024-03-15 ENCOUNTER — HOSPITAL ENCOUNTER (OUTPATIENT)
Dept: RADIOLOGY | Facility: HOSPITAL | Age: 54
Discharge: HOME | End: 2024-03-15
Attending: INTERNAL MEDICINE
Payer: COMMERCIAL

## 2024-03-15 VITALS — BODY MASS INDEX: 28.34 KG/M2 | WEIGHT: 160 LBS

## 2024-03-15 DIAGNOSIS — Z12.31 ENCOUNTER FOR SCREENING MAMMOGRAM FOR MALIGNANT NEOPLASM OF BREAST: ICD-10-CM

## 2024-03-15 PROCEDURE — 77067 SCR MAMMO BI INCL CAD: CPT | Mod: 26,,, | Performed by: RADIOLOGY

## 2024-03-15 PROCEDURE — 77067 SCR MAMMO BI INCL CAD: CPT | Mod: TC

## 2024-03-15 PROCEDURE — 77063 BREAST TOMOSYNTHESIS BI: CPT | Mod: 26,,, | Performed by: RADIOLOGY

## 2024-03-16 ENCOUNTER — HOSPITAL ENCOUNTER (OUTPATIENT)
Dept: RADIOLOGY | Facility: HOSPITAL | Age: 54
Discharge: HOME | End: 2024-03-16
Attending: INTERNAL MEDICINE
Payer: COMMERCIAL

## 2024-03-16 DIAGNOSIS — R74.8 ELEVATED LIVER ENZYMES: ICD-10-CM

## 2024-03-16 PROCEDURE — 76705 ECHO EXAM OF ABDOMEN: CPT | Mod: 26,,, | Performed by: RADIOLOGY

## 2024-03-16 PROCEDURE — 76705 ECHO EXAM OF ABDOMEN: CPT | Mod: TC

## 2024-03-17 ENCOUNTER — PATIENT MESSAGE (OUTPATIENT)
Dept: PRIMARY CARE CLINIC | Facility: CLINIC | Age: 54
End: 2024-03-17
Payer: COMMERCIAL

## 2024-04-01 ENCOUNTER — PATIENT OUTREACH (OUTPATIENT)
Dept: ADMINISTRATIVE | Facility: HOSPITAL | Age: 54
End: 2024-04-01
Payer: COMMERCIAL

## 2024-04-01 NOTE — PROGRESS NOTES
Patient due for the following    Pneumococcal Vaccines (Age 0-64) (1 of 2 - PCV)    Shingles Vaccine (1 of 2)    Foot Exam     Diabetes Urine Screening     Eye Exam       E-faxed Dr. Fuchs for eye exam records.    Immunizations: reviewed and updated  Care Everywhere: triggered  Care Teams: up to date  Outreach:  none needed

## 2024-04-01 NOTE — LETTER
AUTHORIZATION FOR RELEASE OF   CONFIDENTIAL INFORMATION    Dear Dr. Fuchs,    We are seeing Joan Wetzel, date of birth 1970, in the clinic at [facility]. Melissa Neely MD is the patient's PCP. Joan Wetzel has an outstanding lab/procedure at the time we reviewed her chart. In order to help keep her health information updated, she has authorized us to request the following medical record(s):        (  )  MAMMOGRAM                                      (  )  COLONOSCOPY      (  )  PAP SMEAR                                          (  )  OUTSIDE LAB RESULTS     (  )  DEXA SCAN                                          ( X )  EYE EXAM            (  )  FOOT EXAM                                          (  )  ENTIRE RECORD     (  )  OUTSIDE IMMUNIZATIONS                 (  )  _______________         Please fax records to [facility], [place], Christie V., MD, [phone]     If you have any questions, please contact Kendra Voss at [phone].           Patient Name: Joan Wetzel  : 1970  Patient Phone #: [phone]

## 2024-04-17 DIAGNOSIS — E11.9 TYPE 2 DIABETES MELLITUS WITHOUT COMPLICATION: ICD-10-CM

## 2024-05-21 ENCOUNTER — OFFICE VISIT (OUTPATIENT)
Dept: PRIMARY CARE CLINIC | Facility: CLINIC | Age: 54
End: 2024-05-21
Payer: COMMERCIAL

## 2024-05-21 ENCOUNTER — HOSPITAL ENCOUNTER (OUTPATIENT)
Dept: RADIOLOGY | Facility: HOSPITAL | Age: 54
Discharge: HOME | End: 2024-05-21
Attending: INTERNAL MEDICINE
Payer: COMMERCIAL

## 2024-05-21 VITALS
BODY MASS INDEX: 27.7 KG/M2 | HEART RATE: 76 BPM | OXYGEN SATURATION: 97 % | TEMPERATURE: 98 F | DIASTOLIC BLOOD PRESSURE: 84 MMHG | HEIGHT: 63 IN | SYSTOLIC BLOOD PRESSURE: 132 MMHG | WEIGHT: 156.31 LBS

## 2024-05-21 DIAGNOSIS — I10 PRIMARY HYPERTENSION: ICD-10-CM

## 2024-05-21 DIAGNOSIS — E11.69 HYPERLIPIDEMIA ASSOCIATED WITH TYPE 2 DIABETES MELLITUS: ICD-10-CM

## 2024-05-21 DIAGNOSIS — M54.16 LEFT LUMBAR RADICULITIS: ICD-10-CM

## 2024-05-21 DIAGNOSIS — R10.13 EPIGASTRIC ABDOMINAL PAIN: ICD-10-CM

## 2024-05-21 DIAGNOSIS — E11.65 UNCONTROLLED TYPE 2 DIABETES MELLITUS WITH HYPERGLYCEMIA, WITHOUT LONG-TERM CURRENT USE OF INSULIN: Primary | ICD-10-CM

## 2024-05-21 DIAGNOSIS — K76.0 NAFLD (NONALCOHOLIC FATTY LIVER DISEASE): ICD-10-CM

## 2024-05-21 DIAGNOSIS — G43.009 MIGRAINE WITHOUT AURA AND WITHOUT STATUS MIGRAINOSUS, NOT INTRACTABLE: ICD-10-CM

## 2024-05-21 DIAGNOSIS — E78.5 HYPERLIPIDEMIA ASSOCIATED WITH TYPE 2 DIABETES MELLITUS: ICD-10-CM

## 2024-05-21 LAB
ALBUMIN/CREAT UR: 12.7 UG/MG (ref 0–30)
CREAT UR-MCNC: 212 MG/DL (ref 15–325)
MICROALBUMIN UR DL<=1MG/L-MCNC: 27 UG/ML

## 2024-05-21 PROCEDURE — 99214 OFFICE O/P EST MOD 30 MIN: CPT | Mod: S$GLB,,, | Performed by: INTERNAL MEDICINE

## 2024-05-21 PROCEDURE — 3061F NEG MICROALBUMINURIA REV: CPT | Mod: CPTII,S$GLB,, | Performed by: INTERNAL MEDICINE

## 2024-05-21 PROCEDURE — 99999 PR PBB SHADOW E&M-EST. PATIENT-LVL IV: CPT | Mod: PBBFAC,,, | Performed by: INTERNAL MEDICINE

## 2024-05-21 PROCEDURE — 3079F DIAST BP 80-89 MM HG: CPT | Mod: CPTII,S$GLB,, | Performed by: INTERNAL MEDICINE

## 2024-05-21 PROCEDURE — 3066F NEPHROPATHY DOC TX: CPT | Mod: CPTII,S$GLB,, | Performed by: INTERNAL MEDICINE

## 2024-05-21 PROCEDURE — 82043 UR ALBUMIN QUANTITATIVE: CPT | Performed by: INTERNAL MEDICINE

## 2024-05-21 PROCEDURE — 3008F BODY MASS INDEX DOCD: CPT | Mod: CPTII,S$GLB,, | Performed by: INTERNAL MEDICINE

## 2024-05-21 PROCEDURE — 1160F RVW MEDS BY RX/DR IN RCRD: CPT | Mod: CPTII,S$GLB,, | Performed by: INTERNAL MEDICINE

## 2024-05-21 PROCEDURE — 72100 X-RAY EXAM L-S SPINE 2/3 VWS: CPT | Mod: 26,,, | Performed by: RADIOLOGY

## 2024-05-21 PROCEDURE — 3075F SYST BP GE 130 - 139MM HG: CPT | Mod: CPTII,S$GLB,, | Performed by: INTERNAL MEDICINE

## 2024-05-21 PROCEDURE — 3046F HEMOGLOBIN A1C LEVEL >9.0%: CPT | Mod: CPTII,S$GLB,, | Performed by: INTERNAL MEDICINE

## 2024-05-21 PROCEDURE — 72100 X-RAY EXAM L-S SPINE 2/3 VWS: CPT | Mod: TC,PN

## 2024-05-21 PROCEDURE — 1159F MED LIST DOCD IN RCRD: CPT | Mod: CPTII,S$GLB,, | Performed by: INTERNAL MEDICINE

## 2024-05-21 RX ORDER — PANTOPRAZOLE SODIUM 40 MG/1
40 TABLET, DELAYED RELEASE ORAL DAILY
Qty: 30 TABLET | Refills: 1 | Status: SHIPPED | OUTPATIENT
Start: 2024-05-21 | End: 2024-06-12

## 2024-05-22 ENCOUNTER — PATIENT MESSAGE (OUTPATIENT)
Dept: PRIMARY CARE CLINIC | Facility: CLINIC | Age: 54
End: 2024-05-22
Payer: COMMERCIAL

## 2024-05-22 DIAGNOSIS — F51.04 PSYCHOPHYSIOLOGICAL INSOMNIA: ICD-10-CM

## 2024-05-22 DIAGNOSIS — E11.65 UNCONTROLLED TYPE 2 DIABETES MELLITUS WITH HYPERGLYCEMIA, WITHOUT LONG-TERM CURRENT USE OF INSULIN: Primary | ICD-10-CM

## 2024-05-22 DIAGNOSIS — E11.9 TYPE 2 DIABETES MELLITUS WITHOUT COMPLICATION, WITHOUT LONG-TERM CURRENT USE OF INSULIN: ICD-10-CM

## 2024-05-22 RX ORDER — GLIMEPIRIDE 2 MG/1
2 TABLET ORAL
Qty: 90 TABLET | Refills: 1 | Status: SHIPPED | OUTPATIENT
Start: 2024-05-22

## 2024-05-22 RX ORDER — METFORMIN HYDROCHLORIDE 500 MG/1
1000 TABLET ORAL 2 TIMES DAILY WITH MEALS
Qty: 360 TABLET | Refills: 1 | Status: SHIPPED | OUTPATIENT
Start: 2024-05-22

## 2024-05-22 NOTE — TELEPHONE ENCOUNTER
Please alert her to med changes for diabetes. HbA1c (non-fasting) late August. Cancel appt here 5/31/24.

## 2024-05-22 NOTE — TELEPHONE ENCOUNTER
No care due was identified.  [facility] Embedded Care Due Messages. Reference number: [id number].   5/22/2024 11:19:55 AM CDT

## 2024-05-22 NOTE — TELEPHONE ENCOUNTER
No care due was identified.  [facility] Embedded Care Due Messages. Reference number: [id number].   5/22/2024 11:22:16 AM CDT

## 2024-05-22 NOTE — TELEPHONE ENCOUNTER
Refill Routing Note   Medication(s) are not appropriate for processing by [facility] for the following reason(s):        Outside of protocol    [facility] action(s):  Route             Appointments  past 12m or future 3m with PCP    Date Provider   Last Visit   5/21/2024 Melissa Neely MD   Next Visit   5/22/2024 Melissa Neely MD   ED visits in past 90 days: 0        Note composed:12:01 PM 05/22/2024

## 2024-06-03 NOTE — PROGRESS NOTES
Subjective     Patient ID: Joan Wetzel is a 53 y.o. female.    Chief Complaint: Follow-up (6m)    Last had a virtual visit 3 months ago. Returns for scheduled f/u chronic medical conditions. Glucose ranges 160-280 fasting per history (no log), admits she drinks juice during the night. Currently c/o epigastric abdominal pain with nausea, admits to increased alcohol consumption - wine on about 4 nights per week. Taking pantoprazole 20 mg daily. Known h/o pancreatitis. And has significantly elevated liver enzymes. Fatty liver on ultrasound - Hepatology consult is scheduled in August.     PMH:  Hypertension.  Diabetes Type 2, HbA1c 9.3% March '24.  Hyperlipidemia, LDL 66 March '24.  Pancreatitis, pancreatic insufficiency.  Vitamin D insufficiency.  GERD.    PSH: reviewed.     Pap normal 11/20. Mammogram normal 3/24. Colonoscopy 3/21 - diverticulosis. Eye exam 3/21 at [facility], since? No Podiatrist. Vaccines reviewed.     Social: former tobacco use, alcohol 1 a week.  , 3 adult children, lives alone.  [place].    FMH: HTN, DM, Stroke, Heart dis, Stomach cancer, Lung cancer, Alcohol abuse, Dementia.    Allergies: HCTZ, pineapple.     Medications: list reviewed and reconciled.       Review of Systems   Constitutional:  Negative for chills, fever and unexpected weight change.   Respiratory:  Negative for cough and shortness of breath.    Cardiovascular:  Negative for chest pain, palpitations and leg swelling.   Gastrointestinal:  Positive for abdominal pain, nausea and vomiting. Negative for blood in stool and diarrhea.   Genitourinary:  Negative for dysuria and frequency.   Musculoskeletal:  Positive for back pain and leg pain.        Back pain radiates down entire left lower extremity. No traumatic injury. Not using Gabapentin previously prescribed.   Neurological:  Positive for headaches. Negative for seizures, facial asymmetry, speech difficulty, weakness, numbness and  "coordination difficulties.          Objective   Vitals:    05/21/24 0830   BP: 132/84   BP Location: Right arm   Patient Position: Sitting   Pulse: 76   Temp: 98.2 °F (36.8 °C)   TempSrc: Oral   SpO2: 97%   Weight: 70.9 kg (156 lb 4.9 oz)    Stable.    Height: 5' 3" (1.6 m)      Physical Exam  Constitutional:       General: She is not in acute distress.     Appearance: She is not ill-appearing.   HENT:      Mouth/Throat:      Mouth: Mucous membranes are moist.      Pharynx: Oropharynx is clear.   Eyes:      General: No scleral icterus.     Conjunctiva/sclera: Conjunctivae normal.   Cardiovascular:      Rate and Rhythm: Normal rate and regular rhythm.      Heart sounds: Normal heart sounds.   Pulmonary:      Effort: Pulmonary effort is normal. No respiratory distress.      Breath sounds: Normal breath sounds. No wheezing, rhonchi or rales.   Abdominal:      General: Bowel sounds are normal. There is no distension.      Palpations: Abdomen is soft.      Tenderness: There is abdominal tenderness in the epigastric area. There is no guarding or rebound.      Hernia: No hernia is present.   Musculoskeletal:         General: No tenderness. Normal range of motion.      Cervical back: Normal range of motion and neck supple.      Right lower leg: No edema.      Left lower leg: No edema.   Skin:     General: Skin is warm and dry.      Findings: No rash.   Neurological:      Mental Status: She is alert.      Cranial Nerves: No cranial nerve deficit.      Coordination: Coordination normal.      Gait: Gait normal.   Psychiatric:         Mood and Affect: Mood is depressed. Affect is flat.         Speech: Speech normal.         Behavior: Behavior normal.         Thought Content: Thought content normal.       Lab Visit on 05/21/2024   Component Date Value    Sodium 05/21/2024 137     Potassium 05/21/2024 4.3     Chloride 05/21/2024 99     CO2 05/21/2024 28     Glucose 05/21/2024 295 (H)     BUN 05/21/2024 10     Creatinine " 05/21/2024 0.9     Calcium 05/21/2024 9.8     Total Protein 05/21/2024 7.1     Albumin 05/21/2024 3.8     Total Bilirubin 05/21/2024 0.7     Alkaline Phosphatase 05/21/2024 173 (H)     AST 05/21/2024 51 (H)     ALT 05/21/2024 51 (H)     eGFR 05/21/2024 >60.0     Anion Gap 05/21/2024 10     Hemoglobin A1C 05/21/2024 11.0 (H)     Estimated Avg Glucose 05/21/2024 269 (H)     Lipase 05/21/2024 69 (H)    Office Visit on 05/21/2024   Component Date Value    Microalbumin, Urine 05/21/2024 27.0     Creatinine, Urine 05/21/2024 212.0     Microalb/Creat Ratio 05/21/2024 12.7         Assessment and Plan     1. Uncontrolled type 2 diabetes mellitus with hyperglycemia, without long-term current use of insulin  -     Hemoglobin A1C; Future; Expected date: 05/21/2024  -     Microalbumin/Creatinine Ratio, Urine  -     Increase Metformin to 1,000 mg BID.  -     ADD Glimepiride 2 mg one tab daily.    2. NAFLD (nonalcoholic fatty liver disease)  -     Comprehensive Metabolic Panel; Future; Expected date: 05/21/2024  -     STOP drinking alcohol.  -     Hepatology consult as scheduled.    3. Primary hypertension - fair control, continue same.    4. Hyperlipidemia associated with type 2 diabetes mellitus - at goal on Atorvastatin 20 mg.    5. Left lumbar radiculitis  -     X-Ray Lumbar Spine Ap And Lateral; Future; Expected date: 05/21/2024  -     Take Gabapentin 300 mg nightly.    6. Migraine without aura and without status migrainosus, not intractable        -     advised against high dose Ibuprofen due to gastric problems, Acetaminophen prn.     7. Epigastric abdominal pain  -     Lipase; Future; Expected date: 05/21/2024  -     Increase Pantoprazole (PROTONIX) 40 MG tablet; Take 1 tablet (40 mg total) by mouth once daily.  Dispense: 30 tablet; Refill: 1  -     STOP drinking alcohol.       Follow up in about 4 months (around 9/21/2024).

## 2024-06-10 ENCOUNTER — PATIENT OUTREACH (OUTPATIENT)
Dept: ADMINISTRATIVE | Facility: HOSPITAL | Age: 54
End: 2024-06-10
Payer: COMMERCIAL

## 2024-06-10 NOTE — PROGRESS NOTES
Patient due for the following    Pneumococcal Vaccines (Age 0-64) (1 of 2 - PCV)    Shingles Vaccine (1 of 2)    Foot Exam     Eye Exam       E-faxed Dr. Fuchs for eye exam records    Immunizations: reviewed and updated  Care Everywhere: triggered  Care Teams: up to date  Outreach: completed

## 2024-06-10 NOTE — LETTER
AUTHORIZATION FOR RELEASE OF   CONFIDENTIAL INFORMATION    Dear Dr. Fuchs,    We are seeing Joan Wetzel, date of birth 1970, in the clinic at [facility]. Melissa Neely MD is the patient's PCP. Joan Wetzel has an outstanding lab/procedure at the time we reviewed her chart. In order to help keep her health information updated, she has authorized us to request the following medical record(s):        (  )  MAMMOGRAM                                      (  )  COLONOSCOPY      (  )  PAP SMEAR                                          (  )  OUTSIDE LAB RESULTS     (  )  DEXA SCAN                                          ( X )  EYE EXAM            (  )  FOOT EXAM                                          (  )  ENTIRE RECORD     (  )  OUTSIDE IMMUNIZATIONS                 (  )  _______________         Please fax records to [facility], [place], Christie V., [place], [phone]     If you have any questions, please contact Kendra at [phone].           Patient Name: Joan Wetzel  : 1970  Patient Phone #: [phone]

## 2024-06-12 DIAGNOSIS — R10.13 EPIGASTRIC ABDOMINAL PAIN: ICD-10-CM

## 2024-06-12 RX ORDER — PANTOPRAZOLE SODIUM 40 MG/1
40 TABLET, DELAYED RELEASE ORAL
Qty: 90 TABLET | Refills: 0 | Status: SHIPPED | OUTPATIENT
Start: 2024-06-12

## 2024-06-12 NOTE — TELEPHONE ENCOUNTER
Refill Routing Note   Medication(s) are not appropriate for processing by [facility] for the following reason(s):        New or recently adjusted medication    [facility] action(s):  Defer      Medication Therapy Plan: recent dose increase to pantoprazole 40mg 5/21/24      Appointments  past 12m or future 3m with PCP    Date Provider   Last Visit   5/21/2024 Melissa Neely MD   Next Visit   9/23/2024 Melissa Neely MD   ED visits in past 90 days: 0        Note composed:11:13 AM 06/12/2024

## 2024-06-12 NOTE — TELEPHONE ENCOUNTER
No care due was identified.  [facility] Embedded Care Due Messages. Reference number: [id number].   6/12/2024 8:32:33 AM CDT

## 2024-06-16 RX ORDER — DICYCLOMINE HYDROCHLORIDE 10 MG/1
10 CAPSULE ORAL 3 TIMES DAILY PRN
Qty: 90 CAPSULE | Refills: 0 | Status: SHIPPED | OUTPATIENT
Start: 2024-06-16

## 2024-06-16 RX ORDER — ZOLPIDEM TARTRATE 5 MG/1
5 TABLET ORAL NIGHTLY PRN
Qty: 20 TABLET | Refills: 0 | Status: SHIPPED | OUTPATIENT
Start: 2024-06-16

## 2024-08-30 ENCOUNTER — OFFICE VISIT (OUTPATIENT)
Dept: HEPATOLOGY | Facility: CLINIC | Age: 54
End: 2024-08-30
Payer: COMMERCIAL

## 2024-08-30 VITALS — BODY MASS INDEX: 28.34 KG/M2 | WEIGHT: 160 LBS

## 2024-08-30 DIAGNOSIS — I10 ESSENTIAL HYPERTENSION: ICD-10-CM

## 2024-08-30 DIAGNOSIS — Z78.9 ALCOHOL USE: ICD-10-CM

## 2024-08-30 DIAGNOSIS — E11.9 TYPE 2 DIABETES MELLITUS WITHOUT COMPLICATION, WITHOUT LONG-TERM CURRENT USE OF INSULIN: ICD-10-CM

## 2024-08-30 DIAGNOSIS — E11.69 HYPERLIPIDEMIA ASSOCIATED WITH TYPE 2 DIABETES MELLITUS: ICD-10-CM

## 2024-08-30 DIAGNOSIS — E78.5 HYPERLIPIDEMIA ASSOCIATED WITH TYPE 2 DIABETES MELLITUS: ICD-10-CM

## 2024-08-30 DIAGNOSIS — K76.0 NAFLD (NONALCOHOLIC FATTY LIVER DISEASE): ICD-10-CM

## 2024-08-30 DIAGNOSIS — R74.8 ELEVATED LIVER ENZYMES: Primary | ICD-10-CM

## 2024-08-30 DIAGNOSIS — E66.3 OVERWEIGHT (BMI 25.0-29.9): ICD-10-CM

## 2024-08-30 PROBLEM — D72.829 LEUKOCYTOSIS: Status: ACTIVE | Noted: 2018-08-02

## 2024-08-30 PROBLEM — K85.90 ACUTE PANCREATITIS: Status: ACTIVE | Noted: 2018-07-25

## 2024-08-30 PROBLEM — R93.5 ABNORMAL COMPUTERIZED AXIAL TOMOGRAPHY OF ABDOMEN: Status: ACTIVE | Noted: 2018-08-02

## 2024-08-30 PROBLEM — R10.9 ABDOMINAL PAIN: Status: ACTIVE | Noted: 2018-07-25

## 2024-08-30 PROBLEM — D64.9 ANEMIA: Status: ACTIVE | Noted: 2018-08-02

## 2024-08-30 PROCEDURE — 99999 PR PBB SHADOW E&M-EST. PATIENT-LVL IV: CPT | Mod: PBBFAC,,, | Performed by: NURSE PRACTITIONER

## 2024-08-30 NOTE — PROGRESS NOTES
[facility] New Patient Visit    Reason for Visit:  Elevated liver enzymes    PCP: Melissa Neely    HPI:  This is a 53 y.o. female with PMH noted below, here for evaluation of elevated liver enzymes.    The patient's risk factors for fatty liver disease include:     Overweight/Obesity                     Yes; BMI 28.34  Dyslipidemia                                Yes; Well controlled on Atorvastatin 20 mg daily - Refer to most recent lipid panel results below:   Latest Reference Range & Units 03/08/24 14:47   Cholesterol Total 120 - 199 mg/dL 181   HDL 40 - 75 mg/dL 89 (H)   HDL/Cholesterol Ratio 20.0 - 50.0 % 49.2   Non-HDL Cholesterol mg/dL 92   Total Cholesterol/HDL Ratio 2.0 - 5.0  2.0   Triglycerides 30 - 150 mg/dL 131   LDL Cholesterol 63.0 - 159.0 mg/dL 65.8     Insulin resistance/Diabetes         Yes; Uncontrolled DM - Last HgbA1c was 11.0% (5/2024) - On Metformin and Glimepiride    She has had intermittently elevated liver enzymes in a mixed pattern since at least 8/2019.     Most recent abdominal US in 3/2024 showed fatty infiltration of the liver, as below:    FINDINGS:    Liver: Normal in size, measuring 16.5 cm. Diffusely increased parenchymal echotexture with a hepatorenal index of 1.6. no focal hepatic lesions.     Gallbladder: No calculi, wall thickening, or pericholecystic fluid.  No sonographic Freeman's sign.     Biliary system: The common duct is not dilated, measuring 3 mm.  No intrahepatic ductal dilatation.     Spleen: Normal in size with a homogeneous echotexture, measuring 7.2 x 2.1 cm.     Pancreas: The visualized portions of pancreas demonstrate no significant abnormality.     Miscellaneous: No ascites.     Impression:     Hepatic steatosis.     She has never undergone a liver biopsy or non-invasive staging exam.    FIB-4 Calculation: 1.26 at 8/30/2024  2:17 PM   Calculated from:  Last SGOT/AST : 51 at 8/30/2024  2:17 PM  Last SGPT/ALT: 51 at 8/30/2024  2:17 PM    Platelets: 300 at 2024  2:17 PM   Age: 53 y.o.     FIB-4 below 1.30 is considered as low-risk for advanced fibrosis  FIB-4 over 2.67 is considered as high-risk for advanced fibrosis  FIB-4 values between 1.30 and 2.67 are considered as intermediate-risk of advanced fibrosis for ages 36-64.     For ages > 64 the cut-off for low-risk goes to < 2.  This is a screening tool and clinical judgement should be used in the interpretation of these results.    She has no known family history of liver disease. She has a history of alcohol use, but has reduced intake over the past few years. She now consumes on average 5-10 drinks per week.   She does not use illicit drugs. HAV, HBV, HCV and HIV negative on prior labs. She is well appearing, and has no signs or symptoms of hepatic decompensation including jaundice, dark urine, pruritus, abdominal distention, lower extremity edema, hematemesis, melena, or periods of confusion suggestive of hepatic encephalopathy.      PMHX:  has a past medical history of GERD (gastroesophageal reflux disease), Hyperlipidemia associated with type 2 diabetes mellitus, Hypertension, Mental disorder, Pancreatitis, Type 2 diabetes mellitus, and Vitamin D deficiency.    PSHX:  has a past surgical history that includes  section; Endometrial ablation; upper gi; Endoscopic ultrasound of upper gastrointestinal tract (N/A, 2019); Esophagogastroduodenoscopy (N/A, 2019); Laparoscopic cholecystectomy (N/A, 2019); Tubal ligation (1994); Endoscopic ultrasound of upper gastrointestinal tract (N/A, 2019); Colonoscopy (N/A, 3/18/2021); and Endoscopic ultrasound of upper gastrointestinal tract (N/A, 10/15/2021).    The patient's social and family histories were reviewed by me and updated in the appropriate section of the electronic medical record.    Review of patient's allergies indicates:   Allergen Reactions    Pineapple Swelling    Hydrochlorothiazide Other (See Comments)      CAUSING PANCREATITIS      Current Outpatient Medications on File Prior to Visit   Medication Sig Dispense Refill    amLODIPine (NORVASC) 10 MG tablet Take 1 tablet (10 mg total) by mouth once daily. 90 tablet 2    atorvastatin (LIPITOR) 20 MG tablet Take 1 tablet (20 mg total) by mouth once daily. 90 tablet 3    blood-glucose meter kit Monitor as directed. Per insurance coverage. 1 each 0    dicyclomine (BENTYL) 10 MG capsule Take 1 capsule (10 mg total) by mouth 3 (three) times daily as needed (Abdominal pain.). 90 capsule 0    ergocalciferol (ERGOCALCIFEROL) 50,000 unit Cap Take 1 capsule (50,000 Units total) by mouth every 7 days. 12 capsule 3    fluocinonide 0.05% (LIDEX) 0.05 % cream Apply topically 2 (two) times daily. Prn itch. 60 g 0    gabapentin (NEURONTIN) 300 MG capsule Take 1 capsule (300 mg total) by mouth every evening. For nerve pain. 90 capsule 1    glimepiride (AMARYL) 2 MG tablet Take 1 tablet (2 mg total) by mouth before breakfast. 90 tablet 1    magnesium oxide 400 mg magnesium Tab Take 400 mg by mouth once daily. 30 tablet 0    metFORMIN (GLUCOPHAGE) 500 MG tablet Take 2 tablets (1,000 mg total) by mouth 2 (two) times daily with meals. 360 tablet 1    ondansetron (ZOFRAN-ODT) 4 MG TbDL Take 4 mg by mouth every 8 (eight) hours as needed.      pantoprazole (PROTONIX) 40 MG tablet TAKE 1 TABLET BY MOUTH EVERY DAY 90 tablet 0    zolpidem (AMBIEN) 5 MG Tab Take 1 tablet (5 mg total) by mouth nightly as needed (Insomnia.). 20 tablet 0    lipase-protease-amylase 24,000-76,000-120,000 units (CREON) 24,000-76,000 -120,000 unit capsule Take 2 capsules by mouth 4 (four) times daily with meals and nightly. 240 capsule 1     No current facility-administered medications on file prior to visit.     SOCIAL HISTORY:   Social History     Tobacco Use   Smoking Status Former    Current packs/day: 0.00    Types: Cigarettes    Quit date: 2019    Years since quittin.6   Smokeless Tobacco Never   Tobacco  Comments    Quit 2017       Social History     Substance and Sexual Activity   Alcohol Use Yes    Alcohol/week: 2.0 standard drinks of alcohol    Types: 2 Glasses of wine per week    Comment: social        Social History     Substance and Sexual Activity   Drug Use No       ROS:   GENERAL: Denies fever, chills, weight loss/gain, fatigue  HEENT: Denies headaches, dizziness, vision/hearing changes  CARDIOVASCULAR: Denies chest pain, palpitations, or edema  RESPIRATORY: Denies dyspnea, cough  GI: Denies abdominal pain, rectal bleeding, nausea, vomiting. No change in bowel pattern or color  : Denies dysuria, hematuria   SKIN: Denies rash, itching   NEURO: Denies confusion, memory loss, or mood changes  PSYCH: Denies depression or anxiety  HEME/LYMPH: Denies easy bruising or bleeding    Objective Findings:    PHYSICAL EXAM:   Friendly Black or  female, in no acute distress; alert and oriented to person, place and time.  VITALS: Wt 72.6 kg (160 lb)   LMP 04/21/2021   BMI 28.34 kg/m²   HENT: Normocephalic, without obvious abnormality.   EYES: Sclerae anicteric.   NECK: No obvious masses.  CARDIOVASCULAR: No peripheral edema.  RESPIRATORY: Normal respiratory effort.   GI: Non-distended abdomen.  EXTREMITIES:  No clubbing, cyanosis or edema.  SKIN: Warm and dry. No jaundice. No rashes noted to exposed skin.   NEURO:  Normal gait. No asterixis.  PSYCH:  Memory intact. Thought and speech pattern appropriate. Behavior normal. No depression or anxiety noted.    DIAGNOSTIC STUDIES:    ABD. U/S 3/16/2024:    FINDINGS:    Liver: Normal in size, measuring 16.5 cm. Diffusely increased parenchymal echotexture with a hepatorenal index of 1.6. no focal hepatic lesions.     Gallbladder: No calculi, wall thickening, or pericholecystic fluid.  No sonographic Freeman's sign.     Biliary system: The common duct is not dilated, measuring 3 mm.  No intrahepatic ductal dilatation.     Spleen: Normal in size with a homogeneous  echotexture, measuring 7.2 x 2.1 cm.     Pancreas: The visualized portions of pancreas demonstrate no significant abnormality.     Miscellaneous: No ascites.     Impression:     Hepatic steatosis.     FIBROSCAN - Ordered at visit.    EDUCATION:  Per AVS.    ASSESSMENT & PLAN:  53 y.o. Black or  female with:    1. Elevated liver enzymes  Ambulatory referral/consult to Hepatology    FibroScan Transplant Hepatology(Vibration Controlled Transient Elastography)    Hepatic Function Panel    Antimitochondrial Antibody    Anti-Smooth Muscle Antibody    IgG    IGM    Hepatitis A antibody, IgG    Hepatitis B Core Antibody, Total    Hepatitis B Surface Antibody, Qual/Quant    Phosphatidylethanol (PETH)      2. NAFLD (nonalcoholic fatty liver disease)  FibroScan Transplant Hepatology(Vibration Controlled Transient Elastography)    Hepatic Function Panel    Antimitochondrial Antibody    Anti-Smooth Muscle Antibody    IgG    IGM    Hepatitis A antibody, IgG    Hepatitis B Core Antibody, Total    Hepatitis B Surface Antibody, Qual/Quant    Phosphatidylethanol (PETH)      3. Overweight (BMI 25.0-29.9)  FibroScan Transplant Hepatology(Vibration Controlled Transient Elastography)    Hepatic Function Panel    Antimitochondrial Antibody    Anti-Smooth Muscle Antibody    IgG    IGM    Hepatitis A antibody, IgG    Hepatitis B Core Antibody, Total    Hepatitis B Surface Antibody, Qual/Quant    Phosphatidylethanol (PETH)      4. Type 2 diabetes mellitus without complication, without long-term current use of insulin  FibroScan Transplant Hepatology(Vibration Controlled Transient Elastography)    Hepatic Function Panel    Antimitochondrial Antibody    Anti-Smooth Muscle Antibody    IgG    IGM    Hepatitis A antibody, IgG    Hepatitis B Core Antibody, Total    Hepatitis B Surface Antibody, Qual/Quant    Phosphatidylethanol (PETH)      5. Hyperlipidemia associated with type 2 diabetes mellitus  FibroScan Transplant  Hepatology(Vibration Controlled Transient Elastography)    Hepatic Function Panel    Antimitochondrial Antibody    Anti-Smooth Muscle Antibody    IgG    IGM    Hepatitis A antibody, IgG    Hepatitis B Core Antibody, Total    Hepatitis B Surface Antibody, Qual/Quant    Phosphatidylethanol (PETH)      6. Essential hypertension  FibroScan Transplant Hepatology(Vibration Controlled Transient Elastography)    Hepatic Function Panel    Antimitochondrial Antibody    Anti-Smooth Muscle Antibody    IgG    IGM    Hepatitis A antibody, IgG    Hepatitis B Core Antibody, Total    Hepatitis B Surface Antibody, Qual/Quant    Phosphatidylethanol (PETH)      7. Alcohol use  Phosphatidylethanol (PETH)        - Schedule Fibroscan to non-invasively stage liver disease.  - Repeat liver function tests in 1 month. Will also check autoimmune markers.   - Obtain titer levels for Hepatitis A and B, with next blood draw, and arrange for vaccination if needed.  - Maintain a healthy weight, through diet and exercise.  - Recommend good control of cholesterol, blood pressure, & blood sugar levels.  - Avoid alcohol and herbal supplements/alternative remedies.  - Return to clinic to be determined by Fibroscan and lab results.    Thank you for allowing me to participate in the care of Joan Wetzel       Hepatology Nurse Practitioner  [facility]    CC'ed note to:   Melissa Neely, *  Melissa Neely MD

## 2024-09-16 ENCOUNTER — LAB VISIT (OUTPATIENT)
Dept: LAB | Facility: HOSPITAL | Age: 54
End: 2024-09-16
Attending: INTERNAL MEDICINE
Payer: COMMERCIAL

## 2024-09-16 DIAGNOSIS — R74.8 ELEVATED LIVER ENZYMES: ICD-10-CM

## 2024-09-16 DIAGNOSIS — I10 ESSENTIAL HYPERTENSION: ICD-10-CM

## 2024-09-16 DIAGNOSIS — E11.65 UNCONTROLLED TYPE 2 DIABETES MELLITUS WITH HYPERGLYCEMIA, WITHOUT LONG-TERM CURRENT USE OF INSULIN: ICD-10-CM

## 2024-09-16 DIAGNOSIS — E66.3 OVERWEIGHT (BMI 25.0-29.9): ICD-10-CM

## 2024-09-16 DIAGNOSIS — Z78.9 ALCOHOL USE: ICD-10-CM

## 2024-09-16 DIAGNOSIS — E11.9 TYPE 2 DIABETES MELLITUS WITHOUT COMPLICATION, WITHOUT LONG-TERM CURRENT USE OF INSULIN: ICD-10-CM

## 2024-09-16 DIAGNOSIS — E78.5 HYPERLIPIDEMIA ASSOCIATED WITH TYPE 2 DIABETES MELLITUS: ICD-10-CM

## 2024-09-16 DIAGNOSIS — K76.0 NAFLD (NONALCOHOLIC FATTY LIVER DISEASE): ICD-10-CM

## 2024-09-16 DIAGNOSIS — E11.69 HYPERLIPIDEMIA ASSOCIATED WITH TYPE 2 DIABETES MELLITUS: ICD-10-CM

## 2024-09-16 LAB
ALBUMIN SERPL BCP-MCNC: 3.9 G/DL (ref 3.5–5.2)
ALP SERPL-CCNC: 237 U/L (ref 55–135)
ALT SERPL W/O P-5'-P-CCNC: 55 U/L (ref 10–44)
AST SERPL-CCNC: 67 U/L (ref 10–40)
BILIRUB DIRECT SERPL-MCNC: 0.3 MG/DL (ref 0.1–0.3)
BILIRUB SERPL-MCNC: 0.6 MG/DL (ref 0.1–1)
ESTIMATED AVG GLUCOSE: 128 MG/DL (ref 68–131)
HAV IGG SER QL IA: NORMAL
HBA1C MFR BLD: 6.1 % (ref 4–5.6)
HBV CORE AB SERPL QL IA: NORMAL
IGG SERPL-MCNC: 1224 MG/DL (ref 650–1600)
IGM SERPL-MCNC: 95 MG/DL (ref 50–300)
PROT SERPL-MCNC: 7.5 G/DL (ref 6–8.4)

## 2024-09-16 PROCEDURE — 86790 VIRUS ANTIBODY NOS: CPT | Performed by: NURSE PRACTITIONER

## 2024-09-16 PROCEDURE — 82784 ASSAY IGA/IGD/IGG/IGM EACH: CPT | Mod: 59 | Performed by: NURSE PRACTITIONER

## 2024-09-16 PROCEDURE — 80076 HEPATIC FUNCTION PANEL: CPT | Performed by: NURSE PRACTITIONER

## 2024-09-16 PROCEDURE — 86381 MITOCHONDRIAL ANTIBODY EACH: CPT | Performed by: NURSE PRACTITIONER

## 2024-09-16 PROCEDURE — 86704 HEP B CORE ANTIBODY TOTAL: CPT | Performed by: NURSE PRACTITIONER

## 2024-09-16 PROCEDURE — 86706 HEP B SURFACE ANTIBODY: CPT | Performed by: NURSE PRACTITIONER

## 2024-09-16 PROCEDURE — 80321 ALCOHOLS BIOMARKERS 1OR 2: CPT | Performed by: NURSE PRACTITIONER

## 2024-09-16 PROCEDURE — 82784 ASSAY IGA/IGD/IGG/IGM EACH: CPT | Performed by: NURSE PRACTITIONER

## 2024-09-16 PROCEDURE — 86015 ACTIN ANTIBODY EACH: CPT | Performed by: NURSE PRACTITIONER

## 2024-09-16 PROCEDURE — 83036 HEMOGLOBIN GLYCOSYLATED A1C: CPT | Performed by: INTERNAL MEDICINE

## 2024-09-17 LAB
MITOCHONDRIA AB TITR SER IF: NORMAL {TITER}
SMOOTH MUSCLE AB TITR SER IF: NORMAL {TITER}

## 2024-09-19 LAB
HBV SURFACE AB SER QL IA: NEGATIVE
HBV SURFACE AB SERPL IA-ACNC: 3 MIU/ML

## 2024-09-21 LAB
CLINICAL BIOCHEMIST REVIEW: NORMAL
PLPETH BLD-MCNC: 1044 NG/ML
POPETH BLD-MCNC: 1270 NG/ML

## 2024-09-23 ENCOUNTER — OFFICE VISIT (OUTPATIENT)
Dept: PRIMARY CARE CLINIC | Facility: CLINIC | Age: 54
End: 2024-09-23
Payer: COMMERCIAL

## 2024-09-23 VITALS
OXYGEN SATURATION: 98 % | SYSTOLIC BLOOD PRESSURE: 142 MMHG | DIASTOLIC BLOOD PRESSURE: 70 MMHG | HEIGHT: 63 IN | BODY MASS INDEX: 28.9 KG/M2 | TEMPERATURE: 98 F | WEIGHT: 163.13 LBS | HEART RATE: 87 BPM

## 2024-09-23 DIAGNOSIS — I10 ESSENTIAL HYPERTENSION: ICD-10-CM

## 2024-09-23 DIAGNOSIS — R74.8 ELEVATED LIVER ENZYMES: Primary | ICD-10-CM

## 2024-09-23 DIAGNOSIS — E11.9 TYPE 2 DIABETES MELLITUS WITHOUT COMPLICATION, WITHOUT LONG-TERM CURRENT USE OF INSULIN: ICD-10-CM

## 2024-09-23 DIAGNOSIS — I10 PRIMARY HYPERTENSION: ICD-10-CM

## 2024-09-23 DIAGNOSIS — K76.0 NAFLD (NONALCOHOLIC FATTY LIVER DISEASE): ICD-10-CM

## 2024-09-23 DIAGNOSIS — E11.69 HYPERLIPIDEMIA ASSOCIATED WITH TYPE 2 DIABETES MELLITUS: ICD-10-CM

## 2024-09-23 DIAGNOSIS — F51.04 PSYCHOPHYSIOLOGICAL INSOMNIA: ICD-10-CM

## 2024-09-23 DIAGNOSIS — E66.3 OVERWEIGHT (BMI 25.0-29.9): ICD-10-CM

## 2024-09-23 DIAGNOSIS — E78.5 HYPERLIPIDEMIA ASSOCIATED WITH TYPE 2 DIABETES MELLITUS: ICD-10-CM

## 2024-09-23 DIAGNOSIS — E11.9 TYPE 2 DIABETES MELLITUS WITHOUT COMPLICATION, WITHOUT LONG-TERM CURRENT USE OF INSULIN: Primary | ICD-10-CM

## 2024-09-23 DIAGNOSIS — Z78.9 ALCOHOL USE: ICD-10-CM

## 2024-09-23 DIAGNOSIS — M54.42 ACUTE BACK PAIN WITH SCIATICA, LEFT: ICD-10-CM

## 2024-09-23 PROBLEM — Z28.20 VACCINE REFUSED BY PATIENT: Status: RESOLVED | Noted: 2019-09-03 | Resolved: 2024-09-23

## 2024-09-23 PROCEDURE — 99214 OFFICE O/P EST MOD 30 MIN: CPT | Mod: S$GLB,,, | Performed by: INTERNAL MEDICINE

## 2024-09-23 PROCEDURE — 1159F MED LIST DOCD IN RCRD: CPT | Mod: CPTII,S$GLB,, | Performed by: INTERNAL MEDICINE

## 2024-09-23 PROCEDURE — 3008F BODY MASS INDEX DOCD: CPT | Mod: CPTII,S$GLB,, | Performed by: INTERNAL MEDICINE

## 2024-09-23 PROCEDURE — G2211 COMPLEX E/M VISIT ADD ON: HCPCS | Mod: S$GLB,,, | Performed by: INTERNAL MEDICINE

## 2024-09-23 PROCEDURE — 3061F NEG MICROALBUMINURIA REV: CPT | Mod: CPTII,S$GLB,, | Performed by: INTERNAL MEDICINE

## 2024-09-23 PROCEDURE — 99999 PR PBB SHADOW E&M-EST. PATIENT-LVL V: CPT | Mod: PBBFAC,,, | Performed by: INTERNAL MEDICINE

## 2024-09-23 PROCEDURE — 3077F SYST BP >= 140 MM HG: CPT | Mod: CPTII,S$GLB,, | Performed by: INTERNAL MEDICINE

## 2024-09-23 PROCEDURE — 3066F NEPHROPATHY DOC TX: CPT | Mod: CPTII,S$GLB,, | Performed by: INTERNAL MEDICINE

## 2024-09-23 PROCEDURE — 3044F HG A1C LEVEL LT 7.0%: CPT | Mod: CPTII,S$GLB,, | Performed by: INTERNAL MEDICINE

## 2024-09-23 PROCEDURE — 1160F RVW MEDS BY RX/DR IN RCRD: CPT | Mod: CPTII,S$GLB,, | Performed by: INTERNAL MEDICINE

## 2024-09-23 PROCEDURE — 3078F DIAST BP <80 MM HG: CPT | Mod: CPTII,S$GLB,, | Performed by: INTERNAL MEDICINE

## 2024-09-23 RX ORDER — GLIMEPIRIDE 2 MG/1
2 TABLET ORAL
Qty: 90 TABLET | Refills: 1 | Status: SHIPPED | OUTPATIENT
Start: 2024-09-23

## 2024-09-23 RX ORDER — LOSARTAN POTASSIUM 25 MG/1
25 TABLET ORAL DAILY
Qty: 90 TABLET | Refills: 1 | Status: SHIPPED | OUTPATIENT
Start: 2024-09-23

## 2024-09-23 RX ORDER — AMLODIPINE BESYLATE 10 MG/1
10 TABLET ORAL DAILY
Qty: 90 TABLET | Refills: 1 | Status: SHIPPED | OUTPATIENT
Start: 2024-09-23

## 2024-09-23 RX ORDER — METFORMIN HYDROCHLORIDE 500 MG/1
1000 TABLET ORAL 2 TIMES DAILY WITH MEALS
Qty: 360 TABLET | Refills: 1 | Status: SHIPPED | OUTPATIENT
Start: 2024-09-23

## 2024-09-23 RX ORDER — ATORVASTATIN CALCIUM 20 MG/1
20 TABLET, FILM COATED ORAL DAILY
Qty: 90 TABLET | Refills: 1 | Status: SHIPPED | OUTPATIENT
Start: 2024-09-23

## 2024-09-23 RX ORDER — ZOLPIDEM TARTRATE 5 MG/1
5 TABLET ORAL NIGHTLY PRN
Qty: 20 TABLET | Refills: 1 | Status: SHIPPED | OUTPATIENT
Start: 2024-09-23

## 2024-09-23 RX ORDER — GABAPENTIN 300 MG/1
300 CAPSULE ORAL NIGHTLY
Qty: 90 CAPSULE | Refills: 1 | Status: SHIPPED | OUTPATIENT
Start: 2024-09-23

## 2024-09-23 NOTE — PROGRESS NOTES
Subjective     Patient ID: Joan Wetzel is a 53 y.o. female.    Chief Complaint: Follow-up    Last seen 4 months ago. Diabetes was severely uncontrolled compared to recent past - increased Metformin and added Glimepiride. Returns for follow up with  labs in advance showing marked improvement in average blood glucose. Taking daily meds as prescribed, improved her diet including reduced alcohol intake. Has established care with Hepatology for fatty liver with persistent elevation of liver enzymes, but she no-showed her FibroScan earlier this month - she will reschedule it.     PMH:  Hypertension.  Diabetes Type 2, HbA1c 11.0% (from 9.3) May '24.  Hyperlipidemia, LDL 66 March '24.  Pancreatitis, pancreatic insufficiency.  Vitamin D insufficiency.  GERD.    PSH: reviewed.     Pap normal 11/20. Mammogram normal 3/24. Colonoscopy 3/21 - diverticulosis. Eye exam 3/21 at [facility], since? No Podiatrist. Vaccines reviewed.     Social: former tobacco use, alcohol 1 a week.  , 3 adult children, lives alone.  of an apartment complex.    FMH: HTN, DM, Stroke, Heart dis, Stomach cancer, Lung cancer, Alcohol abuse, Dementia.    Allergies: HCTZ, pineapple.     Medications: list reviewed and reconciled.       Review of Systems   Constitutional:  Negative for fatigue and fever.   Eyes:  Negative for visual disturbance.   Respiratory:  Negative for cough and shortness of breath.    Cardiovascular:  Negative for chest pain, palpitations and leg swelling.   Gastrointestinal:  Negative for abdominal pain, nausea and vomiting.        Occasional diarrhea on high dose Metformin (about once a week).    Genitourinary:  Negative for bladder incontinence and dysuria.   Musculoskeletal:  Negative for arthralgias and myalgias.   Integumentary:  Negative for rash and wound.   Neurological:  Negative for dizziness, syncope, weakness and headaches.   Psychiatric/Behavioral:  Negative for depressed mood. The patient is  "not nervous/anxious.           Objective   Vitals:    09/23/24 1336   BP: (!) 144/78   Pulse: 87   Temp: 98.3 °F (36.8 °C)   TempSrc: Oral   SpO2: 98%   Weight: 74 kg (163 lb 2.3 oz)      From 156 four months ago.    Height: 5' 3" (1.6 m)   BMI=28  Physical Exam  Constitutional:       General: She is not in acute distress.     Appearance: She is not ill-appearing.   Cardiovascular:      Rate and Rhythm: Normal rate and regular rhythm.      Heart sounds: Normal heart sounds.   Pulmonary:      Effort: Pulmonary effort is normal. No respiratory distress.      Breath sounds: Normal breath sounds. No wheezing, rhonchi or rales.   Musculoskeletal:         General: Normal range of motion.      Right lower leg: No edema.      Left lower leg: No edema.      Comments: Protective Sensation:  Right: Intact  Left: Intact    Visual Inspection:  Normal -  Bilateral    Pedal Pulses:   Right: Present  Left: Present    Posterior Tibialis Pulses:   Right:Present  Left: Present       Skin:     General: Skin is warm and dry.   Neurological:      Mental Status: She is alert and oriented to person, place, and time.      Cranial Nerves: No cranial nerve deficit.      Coordination: Coordination normal.      Gait: Gait normal.   Psychiatric:         Mood and Affect: Mood normal.         Behavior: Behavior normal.         Thought Content: Thought content normal.       Lab Visit on 09/16/2024   Component Date Value    Hemoglobin A1C 09/16/2024 6.1 (H)     Estimated Avg Glucose 09/16/2024 128     Total Protein 09/16/2024 7.5     Albumin 09/16/2024 3.9     Total Bilirubin 09/16/2024 0.6     Bilirubin, Direct 09/16/2024 0.3     AST 09/16/2024 67 (H)     ALT 09/16/2024 55 (H)     Alkaline Phosphatase 09/16/2024 237 (H)     Anti-Mitochon Ab IFA 09/16/2024 Negative 1:40     Smooth Muscle Ab 09/16/2024 Negative 1:40     IgG 09/16/2024 1224     IgM 09/16/2024 95     Hepatitis A Antibody IgG 09/16/2024 Non-reactive     Hep B Core Total Ab 09/16/2024 " Non-reactive     Hep. B Surf Ab, Qual 09/16/2024 Negative     Hep. B Surf Ab, Quant. 09/16/2024 3     PEth 16:0/18.1 (POPEth) 09/16/2024 1270     PEth 16:0/18.2 (PLPEth) 09/16/2024 1044     PETH INTERPRETATION 09/16/2024 Positive.         Assessment and Plan     1. Type 2 diabetes mellitus without complication, without long-term current use of insulin - well controlled, continue same.   -     Ambulatory referral/consult to Optometry; Future; Expected date: 09/30/2024  -     metFORMIN (GLUCOPHAGE) 500 MG tablet; Take 2 tablets (1,000 mg total) by mouth 2 (two) times daily with meals.  Dispense: 360 tablet; Refill: 1  -     glimepiride (AMARYL) 2 MG tablet; Take 1 tablet (2 mg total) by mouth before breakfast.  Dispense: 90 tablet; Refill: 1    2. Primary hypertension not at goal  -     ADD Losartan (COZAAR) 25 MG tablet; Take 1 tablet (25 mg total) by mouth once daily. For Blood Pressure.  Dispense: 90 tablet; Refill: 1  -     Continue AmLODIPine (NORVASC) 10 MG tablet; Take 1 tablet (10 mg total) by mouth once daily.  Dispense: 90 tablet; Refill: 1    3. Hyperlipidemia associated with type 2 diabetes mellitus  -     atorvastatin (LIPITOR) 20 MG tablet; Take 1 tablet (20 mg total) by mouth once daily.  Dispense: 90 tablet; Refill: 1    4. Acute back pain with sciatica, left  -     gabapentin (NEURONTIN) 300 MG capsule; Take 1 capsule (300 mg total) by mouth every evening. For nerve pain.  Dispense: 90 capsule; Refill: 1    5. Psychophysiological insomnia  -     zolpidem (AMBIEN) 5 MG Tab; Take 1 tablet (5 mg total) by mouth nightly as needed (Insomnia.).  Dispense: 20 tablet; Refill: 1    She defers COVID booster and Flu shot to another day.        Follow up in about 4 months (around 1/23/2025) for Annual Physical. .

## 2024-09-24 ENCOUNTER — TELEPHONE (OUTPATIENT)
Dept: HEPATOLOGY | Facility: CLINIC | Age: 54
End: 2024-09-24
Payer: COMMERCIAL

## 2024-09-24 NOTE — TELEPHONE ENCOUNTER
Patient was called to be notified about Ms. Norris's request for labs, fibroscan, and follow-up in 6 months. Discussed and agreed on appointment day March 6, 2025.

## 2024-09-24 NOTE — TELEPHONE ENCOUNTER
----- Message from Myrna Fan NP sent at 9/23/2024  9:29 AM CDT -----  Please contact patient to schedule repeat labs and a Fibroscan in 3-6 months, along with a f/u visit with me same day. Orders in Epic. Thanks!

## 2024-10-03 ENCOUNTER — OFFICE VISIT (OUTPATIENT)
Dept: OPTOMETRY | Facility: CLINIC | Age: 54
End: 2024-10-03
Payer: COMMERCIAL

## 2024-10-03 DIAGNOSIS — E11.9 TYPE 2 DIABETES MELLITUS WITHOUT COMPLICATION, WITHOUT LONG-TERM CURRENT USE OF INSULIN: ICD-10-CM

## 2024-10-03 DIAGNOSIS — H52.203 MYOPIA WITH ASTIGMATISM AND PRESBYOPIA, BILATERAL: ICD-10-CM

## 2024-10-03 DIAGNOSIS — H52.13 MYOPIA WITH ASTIGMATISM AND PRESBYOPIA, BILATERAL: ICD-10-CM

## 2024-10-03 DIAGNOSIS — H52.4 MYOPIA WITH ASTIGMATISM AND PRESBYOPIA, BILATERAL: ICD-10-CM

## 2024-10-03 DIAGNOSIS — Z46.0 FITTING AND ADJUSTMENT OF SPECTACLES AND CONTACT LENSES: Primary | ICD-10-CM

## 2024-10-03 DIAGNOSIS — E11.9 TYPE 2 DIABETES MELLITUS WITHOUT RETINOPATHY: Primary | ICD-10-CM

## 2024-10-03 PROCEDURE — 99999 PR PBB SHADOW E&M-EST. PATIENT-LVL II: CPT | Mod: PBBFAC,,, | Performed by: OPTOMETRIST

## 2024-10-03 PROCEDURE — 3044F HG A1C LEVEL LT 7.0%: CPT | Mod: CPTII,S$GLB,, | Performed by: OPTOMETRIST

## 2024-10-03 PROCEDURE — 3066F NEPHROPATHY DOC TX: CPT | Mod: CPTII,S$GLB,, | Performed by: OPTOMETRIST

## 2024-10-03 PROCEDURE — 4010F ACE/ARB THERAPY RXD/TAKEN: CPT | Mod: CPTII,S$GLB,, | Performed by: OPTOMETRIST

## 2024-10-03 PROCEDURE — 99999 PR PBB SHADOW E&M-EST. PATIENT-LVL III: CPT | Mod: PBBFAC,,, | Performed by: OPTOMETRIST

## 2024-10-03 PROCEDURE — 92015 DETERMINE REFRACTIVE STATE: CPT | Mod: S$GLB,,, | Performed by: OPTOMETRIST

## 2024-10-03 PROCEDURE — 3061F NEG MICROALBUMINURIA REV: CPT | Mod: CPTII,S$GLB,, | Performed by: OPTOMETRIST

## 2024-10-03 PROCEDURE — 1159F MED LIST DOCD IN RCRD: CPT | Mod: CPTII,S$GLB,, | Performed by: OPTOMETRIST

## 2024-10-03 PROCEDURE — 1160F RVW MEDS BY RX/DR IN RCRD: CPT | Mod: CPTII,S$GLB,, | Performed by: OPTOMETRIST

## 2024-10-03 PROCEDURE — 92014 COMPRE OPH EXAM EST PT 1/>: CPT | Mod: S$GLB,,, | Performed by: OPTOMETRIST

## 2024-10-03 PROCEDURE — 2023F DILAT RTA XM W/O RTNOPTHY: CPT | Mod: CPTII,S$GLB,, | Performed by: OPTOMETRIST

## 2024-10-03 NOTE — PROGRESS NOTES
HPI    ORA: about 5-6 months ago elsewhere  Chief complaint (CC): Patient is here for annual eye exam today.  Patient   had an exam at [facility] about 5 or 6 months ago but wasn't dilated at that   exam. Patient also had trouble seeing at a distance near with the glasses   and the contacts. Patient had distance only contacts and wore +1.50 with   the contacts for near. Patient had trouble with the reading and contacts   together and ended up wearing OD only for distance nothing OS for near.    Patient did not bring glasses or contacts today.  Glasses? +  Contacts? +  H/o eye surgery, injections or laser: -  H/o eye injury: -  Known eye conditions? See above  Family h/o eye conditions? -  Eye gtts? Visine and contact solution to wash her eyes      (-) Flashes (-)  Floaters (-) Mucous   (+)  Tearing (+) Itching (+) Burning   (-) Headaches (-) Eye Pain/discomfort (-) Irritation   (-)  Redness (-) Double vision (-) Blurry vision    Diabetic? +  A1c? Hemoglobin A1C       Date                     Value               Ref Range             Status                09/16/2024               6.1 (H)             4.0 - 5.6 %           Final                05/21/2024               11.0 (H)            4.0 - 5.6 %           Final                 03/08/2024               9.3 (H)             4.0 - 5.6 %           Final                    Last edited by Sindy Nelson on 10/3/2024  3:35 PM.            Assessment /Plan     For exam results, see Encounter Report.    Type 2 diabetes mellitus without retinopathy    Type 2 diabetes mellitus without complication, without long-term current use of insulin  -     Ambulatory referral/consult to Optometry    Myopia with astigmatism and presbyopia, bilateral      BS control. No signs of diabetic retinopathy. Monitor with annual exam.   CLRx and SRx released to patient. Patient educated on lens options. Normal ocular health. RTC 1 year for routine exam.

## 2024-10-12 ENCOUNTER — PATIENT MESSAGE (OUTPATIENT)
Dept: OPTOMETRY | Facility: CLINIC | Age: 54
End: 2024-10-12
Payer: COMMERCIAL

## 2024-10-16 ENCOUNTER — PATIENT MESSAGE (OUTPATIENT)
Dept: ADMINISTRATIVE | Facility: HOSPITAL | Age: 54
End: 2024-10-16
Payer: COMMERCIAL

## 2024-10-18 ENCOUNTER — PATIENT MESSAGE (OUTPATIENT)
Dept: ADMINISTRATIVE | Facility: HOSPITAL | Age: 54
End: 2024-10-18
Payer: COMMERCIAL

## 2024-11-11 NOTE — PROGRESS NOTES
[facility] Note    Chief Complaint      Chief Complaint   Patient presents with    Nausea    Nasal Congestion       History of Present Illness      Joan Wetzel is a 54 y.o. female who presents today via virtual visit for   Chief Complaint   Patient presents with    Nausea    Nasal Congestion         Patient presents to clinic via virtual visit with symptoms of nausea and sinus congestion.  Patient reports she has been having cramping in her stomach and intermittent nausea.  Symptoms have been present for 1 week.  She states she is starting to feel better.  There are no other complaints at this time.  She denies any chest pain any shortness a breath.    Abdominal Pain  This is a recurrent problem. The current episode started in the past 7 days. The onset quality is gradual. The problem occurs 2 to 4 times per day. The pain is severe. Associated symptoms include arthralgias, a fever, flatus, headaches, myalgias and nausea. Pertinent negatives include no anorexia. The pain is aggravated by certain positions. The pain is relieved by Being still and liquids. She has tried acetaminophen for the symptoms. The treatment provided moderate relief. Her past medical history is significant for gallstones, GERD, irritable bowel syndrome and pancreatitis. There is no history of abdominal surgery, colon cancer, Crohn's disease, PUD or ulcerative colitis. Patient's medical history does not include kidney stones and UTI.        Review of Systems   Constitutional:  Positive for fever.   Gastrointestinal:  Positive for abdominal pain, flatus and nausea. Negative for anorexia.   Musculoskeletal:  Positive for arthralgias and myalgias.   Neurological:  Positive for headaches.        Family History:  family history includes Alcohol abuse in her father and mother; Aneurysm in her mother; Coronary artery disease in her paternal grandmother; Dementia in her paternal grandfather and paternal grandmother; Diabetes in her  maternal aunt and maternal uncle; Heart disease in her maternal uncle; Heart failure in her mother; Hypertension in her father; Liver disease in her father; Lung cancer in her maternal uncle; Stomach cancer in her maternal aunt; Stroke in her mother.   Family history was reviewed with patient.     Medications:  Outpatient Encounter Medications as of 11/12/2024   Medication Sig Note Dispense Refill    amLODIPine (NORVASC) 10 MG tablet Take 1 tablet (10 mg total) by mouth once daily.  90 tablet 1    atorvastatin (LIPITOR) 20 MG tablet Take 1 tablet (20 mg total) by mouth once daily.  90 tablet 1    blood-glucose meter kit Monitor as directed. Per insurance coverage.  1 each 0    dicyclomine (BENTYL) 10 MG capsule Take 1 capsule (10 mg total) by mouth 3 (three) times daily as needed (Abdominal pain.).  90 capsule 0    ergocalciferol (ERGOCALCIFEROL) 50,000 unit Cap Take 1 capsule (50,000 Units total) by mouth every 7 days.  12 capsule 3    fluocinonide 0.05% (LIDEX) 0.05 % cream Apply topically 2 (two) times daily. Prn itch.  60 g 0    gabapentin (NEURONTIN) 300 MG capsule Take 1 capsule (300 mg total) by mouth every evening. For nerve pain.  90 capsule 1    glimepiride (AMARYL) 2 MG tablet Take 1 tablet (2 mg total) by mouth before breakfast.  90 tablet 1    lipase-protease-amylase 24,000-76,000-120,000 units (CREON) 24,000-76,000 -120,000 unit capsule Take 2 capsules by mouth 4 (four) times daily with meals and nightly.  240 capsule 1    losartan (COZAAR) 25 MG tablet Take 1 tablet (25 mg total) by mouth once daily. For Blood Pressure.  90 tablet 1    magnesium oxide 400 mg magnesium Tab Take 400 mg by mouth once daily.  30 tablet 0    metFORMIN (GLUCOPHAGE) 500 MG tablet Take 2 tablets (1,000 mg total) by mouth 2 (two) times daily with meals.  360 tablet 1    ondansetron (ZOFRAN) 4 MG tablet Take 1 tablet (4 mg total) by mouth every 12 (twelve) hours as needed for Nausea.  8 tablet 0    ondansetron (ZOFRAN-ODT) 4 MG  TbDL Take 4 mg by mouth every 8 (eight) hours as needed. 10/26/2023: PRN      pantoprazole (PROTONIX) 40 MG tablet TAKE 1 TABLET BY MOUTH EVERY DAY  90 tablet 2    predniSONE (DELTASONE) 20 MG tablet Take 1 tablet (20 mg total) by mouth once daily.  5 tablet 0    zolpidem (AMBIEN) 5 MG Tab Take 1 tablet (5 mg total) by mouth nightly as needed (Insomnia.).  20 tablet 1     No facility-administered encounter medications on file as of 11/12/2024.       Allergies:  Review of patient's allergies indicates:   Allergen Reactions    Pineapple Swelling    Hydrochlorothiazide Other (See Comments)     CAUSING PANCREATITIS        Health Maintenance:  Health Maintenance   Topic Date Due    Shingles Vaccine (1 of 2) Never done    Lipid Panel  03/08/2025    Mammogram  03/15/2025    Hemoglobin A1c  03/16/2025    Low Dose Statin  09/23/2025    Foot Exam  09/23/2025    Eye Exam  10/03/2025    TETANUS VACCINE  09/19/2027    Colorectal Cancer Screening  03/18/2031    Hepatitis C Screening  Completed     Health Maintenance Topics with due status: Not Due       Topic Last Completion Date    TETANUS VACCINE 09/19/2017    Cervical Cancer Screening 11/24/2020    Colorectal Cancer Screening 03/18/2021    Lipid Panel 03/08/2024    Mammogram 03/15/2024    Diabetes Urine Screening 05/21/2024    Hemoglobin A1c 09/16/2024    Low Dose Statin 09/23/2024    Foot Exam 09/23/2024    Eye Exam 10/03/2024    RSV Vaccine (Age 60+ and Pregnant patients) Not Due       Physical Exam       ]        Assessment/Plan     Joan Wetzel is a 54 y.o.female with:    Nausea  -     ondansetron (ZOFRAN) 4 MG tablet; Take 1 tablet (4 mg total) by mouth every 12 (twelve) hours as needed for Nausea.  Dispense: 8 tablet; Refill: 0    Sinus congestion  -     predniSONE (DELTASONE) 20 MG tablet; Take 1 tablet (20 mg total) by mouth once daily.  Dispense: 5 tablet; Refill: 0        As above, continue current medications and maintain follow up with specialists.  Return to  clinic as needed.    Greater than 50% of this time was spent face to face via virtual visit with patient.  All questions were answered to patient's satisfaction.    The patient location is: home  The chief complaint leading to consultation is: nausea, stomach cramps, sinus congest ion    Visit type: audiovisual    Face to Face time with patient:   Six minutes of total time spent on the encounter, which includes face to face time and non-face to face time preparing to see the patient (eg, review of tests), Obtaining and/or reviewing separately obtained history, Documenting clinical information in the electronic or other health record, Independently interpreting results (not separately reported) and communicating results to the patient/family/caregiver, or Care coordination (not separately reported).         Each patient to whom he or she provides medical services by telemedicine is:  (1) informed of the relationship between the physician and patient and the respective role of any other health care provider with respect to management of the patient; and (2) notified that he or she may decline to receive medical services by telemedicine and may withdraw from such care at any time.       KALEB UsC  [facility]

## 2024-11-12 ENCOUNTER — OFFICE VISIT (OUTPATIENT)
Dept: PRIMARY CARE CLINIC | Facility: CLINIC | Age: 54
End: 2024-11-12
Payer: COMMERCIAL

## 2024-11-12 ENCOUNTER — PATIENT MESSAGE (OUTPATIENT)
Dept: PRIMARY CARE CLINIC | Facility: CLINIC | Age: 54
End: 2024-11-12
Payer: COMMERCIAL

## 2024-11-12 VITALS — DIASTOLIC BLOOD PRESSURE: 70 MMHG | SYSTOLIC BLOOD PRESSURE: 130 MMHG

## 2024-11-12 DIAGNOSIS — R11.0 NAUSEA: Primary | ICD-10-CM

## 2024-11-12 DIAGNOSIS — R09.81 SINUS CONGESTION: ICD-10-CM

## 2024-11-12 PROCEDURE — 3044F HG A1C LEVEL LT 7.0%: CPT | Mod: CPTII,95,, | Performed by: NURSE PRACTITIONER

## 2024-11-12 PROCEDURE — 4010F ACE/ARB THERAPY RXD/TAKEN: CPT | Mod: CPTII,95,, | Performed by: NURSE PRACTITIONER

## 2024-11-12 PROCEDURE — 1160F RVW MEDS BY RX/DR IN RCRD: CPT | Mod: CPTII,95,, | Performed by: NURSE PRACTITIONER

## 2024-11-12 PROCEDURE — 99214 OFFICE O/P EST MOD 30 MIN: CPT | Mod: 95,,, | Performed by: NURSE PRACTITIONER

## 2024-11-12 PROCEDURE — 3066F NEPHROPATHY DOC TX: CPT | Mod: CPTII,95,, | Performed by: NURSE PRACTITIONER

## 2024-11-12 PROCEDURE — 3061F NEG MICROALBUMINURIA REV: CPT | Mod: CPTII,95,, | Performed by: NURSE PRACTITIONER

## 2024-11-12 PROCEDURE — 1159F MED LIST DOCD IN RCRD: CPT | Mod: CPTII,95,, | Performed by: NURSE PRACTITIONER

## 2024-11-12 RX ORDER — PREDNISONE 20 MG/1
20 TABLET ORAL DAILY
Qty: 5 TABLET | Refills: 0 | Status: SHIPPED | OUTPATIENT
Start: 2024-11-12

## 2024-11-12 RX ORDER — ONDANSETRON 4 MG/1
4 TABLET, FILM COATED ORAL EVERY 12 HOURS PRN
Qty: 8 TABLET | Refills: 0 | Status: SHIPPED | OUTPATIENT
Start: 2024-11-12

## 2024-11-12 NOTE — LETTER
November 12, 2024      [facility]  [address]  [facility] [address]  Phone: [phone]  Fax: [phone]       Patient: Joan Wetzel   YOB: 1970  Date of Visit: 11/12/2024    To Whom It May Concern:    Aidan Wetzel  was at [facility] on 11/12/2024. She may return to work/school on 11/13/24 with no restrictions. If you have any questions or concerns, or if I can be of further assistance, please do not hesitate to contact me.    Sincerely,        Siri Woods, NP

## 2025-01-29 ENCOUNTER — OFFICE VISIT (OUTPATIENT)
Dept: PRIMARY CARE CLINIC | Facility: CLINIC | Age: 55
End: 2025-01-29
Payer: COMMERCIAL

## 2025-01-29 ENCOUNTER — LAB VISIT (OUTPATIENT)
Dept: LAB | Facility: HOSPITAL | Age: 55
End: 2025-01-29
Attending: INTERNAL MEDICINE
Payer: COMMERCIAL

## 2025-01-29 VITALS
HEART RATE: 77 BPM | WEIGHT: 158.31 LBS | DIASTOLIC BLOOD PRESSURE: 84 MMHG | BODY MASS INDEX: 28.05 KG/M2 | RESPIRATION RATE: 19 BRPM | SYSTOLIC BLOOD PRESSURE: 136 MMHG | HEIGHT: 63 IN | OXYGEN SATURATION: 98 %

## 2025-01-29 DIAGNOSIS — E55.9 VITAMIN D DEFICIENCY: ICD-10-CM

## 2025-01-29 DIAGNOSIS — Z00.00 ROUTINE MEDICAL EXAM: Primary | ICD-10-CM

## 2025-01-29 DIAGNOSIS — Z00.00 ROUTINE MEDICAL EXAM: ICD-10-CM

## 2025-01-29 DIAGNOSIS — I10 PRIMARY HYPERTENSION: ICD-10-CM

## 2025-01-29 DIAGNOSIS — E83.42 HYPOMAGNESEMIA: ICD-10-CM

## 2025-01-29 DIAGNOSIS — E11.9 TYPE 2 DIABETES MELLITUS WITHOUT COMPLICATION, WITHOUT LONG-TERM CURRENT USE OF INSULIN: ICD-10-CM

## 2025-01-29 DIAGNOSIS — E78.5 HYPERLIPIDEMIA ASSOCIATED WITH TYPE 2 DIABETES MELLITUS: ICD-10-CM

## 2025-01-29 DIAGNOSIS — K21.9 GASTROESOPHAGEAL REFLUX DISEASE WITHOUT ESOPHAGITIS: ICD-10-CM

## 2025-01-29 DIAGNOSIS — Z23 INFLUENZA VACCINE NEEDED: ICD-10-CM

## 2025-01-29 DIAGNOSIS — Z23 NEED FOR PNEUMOCOCCAL VACCINE: ICD-10-CM

## 2025-01-29 DIAGNOSIS — G89.29 CHRONIC MIDLINE LOW BACK PAIN WITH BILATERAL SCIATICA: ICD-10-CM

## 2025-01-29 DIAGNOSIS — M54.42 CHRONIC MIDLINE LOW BACK PAIN WITH BILATERAL SCIATICA: ICD-10-CM

## 2025-01-29 DIAGNOSIS — E11.69 HYPERLIPIDEMIA ASSOCIATED WITH TYPE 2 DIABETES MELLITUS: ICD-10-CM

## 2025-01-29 DIAGNOSIS — Z13.820 OSTEOPOROSIS SCREENING: ICD-10-CM

## 2025-01-29 DIAGNOSIS — Z12.31 ENCOUNTER FOR SCREENING MAMMOGRAM FOR BREAST CANCER: ICD-10-CM

## 2025-01-29 DIAGNOSIS — M54.41 CHRONIC MIDLINE LOW BACK PAIN WITH BILATERAL SCIATICA: ICD-10-CM

## 2025-01-29 PROBLEM — R74.8 ELEVATED LIVER ENZYMES: Status: RESOLVED | Noted: 2024-08-30 | Resolved: 2025-01-29

## 2025-01-29 PROBLEM — K85.90 ACUTE PANCREATITIS: Status: RESOLVED | Noted: 2018-07-25 | Resolved: 2025-01-29

## 2025-01-29 LAB
25(OH)D3+25(OH)D2 SERPL-MCNC: 20 NG/ML (ref 30–96)
ALBUMIN/CREAT UR: 17.9 UG/MG (ref 0–30)
ANION GAP SERPL CALC-SCNC: 11 MMOL/L (ref 8–16)
BASOPHILS # BLD AUTO: 0.07 K/UL (ref 0–0.2)
BASOPHILS NFR BLD: 1.7 % (ref 0–1.9)
BUN SERPL-MCNC: 9 MG/DL (ref 6–20)
CALCIUM SERPL-MCNC: 9.4 MG/DL (ref 8.7–10.5)
CHLORIDE SERPL-SCNC: 104 MMOL/L (ref 95–110)
CHOLEST SERPL-MCNC: 241 MG/DL (ref 120–199)
CHOLEST/HDLC SERPL: 2 {RATIO} (ref 2–5)
CO2 SERPL-SCNC: 27 MMOL/L (ref 23–29)
CREAT SERPL-MCNC: 0.6 MG/DL (ref 0.5–1.4)
CREAT UR-MCNC: 78 MG/DL (ref 15–325)
DIFFERENTIAL METHOD BLD: ABNORMAL
EOSINOPHIL # BLD AUTO: 0.1 K/UL (ref 0–0.5)
EOSINOPHIL NFR BLD: 2.2 % (ref 0–8)
ERYTHROCYTE [DISTWIDTH] IN BLOOD BY AUTOMATED COUNT: 12.6 % (ref 11.5–14.5)
EST. GFR  (NO RACE VARIABLE): >60 ML/MIN/1.73 M^2
ESTIMATED AVG GLUCOSE: 126 MG/DL (ref 68–131)
GLUCOSE SERPL-MCNC: 74 MG/DL (ref 70–110)
HBA1C MFR BLD: 6 % (ref 4–5.6)
HCT VFR BLD AUTO: 38.2 % (ref 37–48.5)
HDLC SERPL-MCNC: 123 MG/DL (ref 40–75)
HDLC SERPL: 51 % (ref 20–50)
HGB BLD-MCNC: 13.1 G/DL (ref 12–16)
IMM GRANULOCYTES # BLD AUTO: 0.01 K/UL (ref 0–0.04)
IMM GRANULOCYTES NFR BLD AUTO: 0.2 % (ref 0–0.5)
LDLC SERPL CALC-MCNC: 106.8 MG/DL (ref 63–159)
LYMPHOCYTES # BLD AUTO: 2.1 K/UL (ref 1–4.8)
LYMPHOCYTES NFR BLD: 51.7 % (ref 18–48)
MAGNESIUM SERPL-MCNC: 1.5 MG/DL (ref 1.6–2.6)
MCH RBC QN AUTO: 34.9 PG (ref 27–31)
MCHC RBC AUTO-ENTMCNC: 34.3 G/DL (ref 32–36)
MCV RBC AUTO: 102 FL (ref 82–98)
MICROALBUMIN UR DL<=1MG/L-MCNC: 14 UG/ML
MONOCYTES # BLD AUTO: 0.5 K/UL (ref 0.3–1)
MONOCYTES NFR BLD: 11.2 % (ref 4–15)
NEUTROPHILS # BLD AUTO: 1.4 K/UL (ref 1.8–7.7)
NEUTROPHILS NFR BLD: 33 % (ref 38–73)
NONHDLC SERPL-MCNC: 118 MG/DL
NRBC BLD-RTO: 0 /100 WBC
PLATELET # BLD AUTO: 209 K/UL (ref 150–450)
PMV BLD AUTO: 10.6 FL (ref 9.2–12.9)
POTASSIUM SERPL-SCNC: 3.7 MMOL/L (ref 3.5–5.1)
RBC # BLD AUTO: 3.75 M/UL (ref 4–5.4)
SODIUM SERPL-SCNC: 142 MMOL/L (ref 136–145)
TRIGL SERPL-MCNC: 56 MG/DL (ref 30–150)
WBC # BLD AUTO: 4.12 K/UL (ref 3.9–12.7)

## 2025-01-29 PROCEDURE — 3079F DIAST BP 80-89 MM HG: CPT | Mod: CPTII,S$GLB,, | Performed by: INTERNAL MEDICINE

## 2025-01-29 PROCEDURE — 99999 PR PBB SHADOW E&M-EST. PATIENT-LVL IV: CPT | Mod: PBBFAC,,, | Performed by: INTERNAL MEDICINE

## 2025-01-29 PROCEDURE — 3075F SYST BP GE 130 - 139MM HG: CPT | Mod: CPTII,S$GLB,, | Performed by: INTERNAL MEDICINE

## 2025-01-29 PROCEDURE — 85025 COMPLETE CBC W/AUTO DIFF WBC: CPT | Performed by: INTERNAL MEDICINE

## 2025-01-29 PROCEDURE — 83735 ASSAY OF MAGNESIUM: CPT | Performed by: INTERNAL MEDICINE

## 2025-01-29 PROCEDURE — 82306 VITAMIN D 25 HYDROXY: CPT | Performed by: INTERNAL MEDICINE

## 2025-01-29 PROCEDURE — 3072F LOW RISK FOR RETINOPATHY: CPT | Mod: CPTII,S$GLB,, | Performed by: INTERNAL MEDICINE

## 2025-01-29 PROCEDURE — 80061 LIPID PANEL: CPT | Performed by: INTERNAL MEDICINE

## 2025-01-29 PROCEDURE — 83036 HEMOGLOBIN GLYCOSYLATED A1C: CPT | Performed by: INTERNAL MEDICINE

## 2025-01-29 PROCEDURE — 1159F MED LIST DOCD IN RCRD: CPT | Mod: CPTII,S$GLB,, | Performed by: INTERNAL MEDICINE

## 2025-01-29 PROCEDURE — 36415 COLL VENOUS BLD VENIPUNCTURE: CPT | Mod: PN | Performed by: INTERNAL MEDICINE

## 2025-01-29 PROCEDURE — 80048 BASIC METABOLIC PNL TOTAL CA: CPT | Performed by: INTERNAL MEDICINE

## 2025-01-29 PROCEDURE — 3008F BODY MASS INDEX DOCD: CPT | Mod: CPTII,S$GLB,, | Performed by: INTERNAL MEDICINE

## 2025-01-29 PROCEDURE — 1160F RVW MEDS BY RX/DR IN RCRD: CPT | Mod: CPTII,S$GLB,, | Performed by: INTERNAL MEDICINE

## 2025-01-29 PROCEDURE — 99396 PREV VISIT EST AGE 40-64: CPT | Mod: S$GLB,,, | Performed by: INTERNAL MEDICINE

## 2025-01-29 PROCEDURE — 82043 UR ALBUMIN QUANTITATIVE: CPT | Performed by: INTERNAL MEDICINE

## 2025-01-29 RX ORDER — PANTOPRAZOLE SODIUM 20 MG/1
20 TABLET, DELAYED RELEASE ORAL DAILY
Qty: 90 TABLET | Refills: 1 | Status: SHIPPED | OUTPATIENT
Start: 2025-01-29

## 2025-01-29 RX ORDER — GABAPENTIN 300 MG/1
300 CAPSULE ORAL NIGHTLY
Qty: 90 CAPSULE | Refills: 1 | Status: SHIPPED | OUTPATIENT
Start: 2025-01-29

## 2025-01-29 RX ORDER — AMLODIPINE BESYLATE 10 MG/1
10 TABLET ORAL DAILY
Qty: 90 TABLET | Refills: 1 | Status: SHIPPED | OUTPATIENT
Start: 2025-01-29

## 2025-01-29 RX ORDER — LOSARTAN POTASSIUM 25 MG/1
25 TABLET ORAL DAILY
Qty: 90 TABLET | Refills: 1 | Status: SHIPPED | OUTPATIENT
Start: 2025-01-29

## 2025-01-29 NOTE — PROGRESS NOTES
Subjective     Patient ID: Joan Wetzel is a 54 y.o. female.    Chief Complaint: Annual Exam    Last seen 4 months ago with remarkable improvement in diabetes control due to better compliance with meds and diet. Returns for annual physical and f/u chronic medical conditions. Taking daily meds as prescribed except Metformin only one 500 mg tab daily due to diarrhea; and out of Rx Vitamin D and Magnesium. Has taken an OTC Vitamin D daily. Home BP reportedly controlled better than it is today, checked about twice a week; no written log for review. Glucose ranges 100-120's, occasionally near 150 if she eats the wrong thing (per history, no log for review). Feeling well aside from a flare of midline low back pain with bilateral sciatica; not currently using her Gabapentin. Alternates between Tylenol and Ibuprofen with some relief.      PMH:  Hypertension.  Diabetes Type 2, HbA1c 6.1% (from 11) Sep. '24.  Hyperlipidemia, LDL 66 March '24.  Pancreatitis, pancreatic insufficiency.  Vitamin D insufficiency.  GERD.  NAFLD.    PSH: reviewed.     Pap normal 11/20. Mammogram normal 3/24. Colonoscopy 3/21 - diverticulosis. Eye exam 10/24. No Podiatrist. Vaccines reviewed.     Social: former tobacco use, alcohol 1 a week.  , 3 adult children, lives alone.  of an apartment complex.    FMH: HTN, DM, Stroke, Heart dis, Stomach cancer, Lung cancer, Alcohol abuse, Dementia.    Allergies: HCTZ, pineapple.     Medications: list reviewed and reconciled. Zolpidem used sparingly, has not refilled 20 tabs since 9/24.     Review of Systems   Constitutional:  Negative for activity change, appetite change, fatigue, fever and unexpected weight change.   HENT:  Negative for nasal congestion, ear pain, hearing loss, rhinorrhea, sneezing, sore throat, trouble swallowing and voice change.    Eyes:  Negative for pain and visual disturbance.   Respiratory:  Negative for cough, chest tightness, shortness of breath and  "wheezing.    Cardiovascular:  Negative for chest pain, palpitations and leg swelling.   Gastrointestinal:  Negative for abdominal pain, blood in stool, constipation, diarrhea, nausea and vomiting.   Genitourinary:  Negative for bladder incontinence, dysuria, frequency, hematuria, pelvic pain and vaginal bleeding.        LMP two years ago.   Musculoskeletal:  Positive for back pain and leg pain. Negative for arthralgias, gait problem, joint swelling and myalgias.   Integumentary:  Negative for color change and rash.   Neurological:  Negative for dizziness, syncope, facial asymmetry, speech difficulty, weakness, numbness and headaches.   Hematological:  Negative for adenopathy. Does not bruise/bleed easily.   Psychiatric/Behavioral:  Negative for confusion, decreased concentration, dysphoric mood and sleep disturbance. The patient is not nervous/anxious.           Objective   Vitals:    01/29/25 0816   BP: 136/84   BP Location: Right arm   Patient Position: Sitting   Pulse: 77   Resp: 19   SpO2: 98%   Weight: 71.8 kg (158 lb 4.6 oz)     From 163 four months ago.   Height: 5' 3" (1.6 m)   BMI=28  Physical Exam  Vitals reviewed.   Constitutional:       General: She is not in acute distress.     Appearance: She is well-developed. She is not ill-appearing or diaphoretic.   HENT:      Head: Normocephalic and atraumatic.      Right Ear: Tympanic membrane and ear canal normal.      Left Ear: Tympanic membrane and ear canal normal.      Nose: Nose normal. No congestion.      Mouth/Throat:      Mouth: Mucous membranes are moist.      Pharynx: Oropharynx is clear.   Eyes:      General: No scleral icterus.     Extraocular Movements: Extraocular movements intact.      Conjunctiva/sclera: Conjunctivae normal.      Right eye: Right conjunctiva is not injected.      Left eye: Left conjunctiva is not injected.      Pupils: Pupils are equal, round, and reactive to light.   Neck:      Thyroid: No thyromegaly.      Vascular: No carotid " bruit or JVD.   Cardiovascular:      Rate and Rhythm: Normal rate and regular rhythm.      Pulses: Normal pulses.      Heart sounds: Normal heart sounds. No murmur heard.     No friction rub. No gallop.   Pulmonary:      Effort: Pulmonary effort is normal. No respiratory distress.      Breath sounds: Normal breath sounds. No wheezing, rhonchi or rales.   Abdominal:      General: Bowel sounds are normal. There is no distension.      Palpations: Abdomen is soft. There is no mass.      Tenderness: There is no abdominal tenderness.   Musculoskeletal:         General: No tenderness or deformity. Normal range of motion.      Cervical back: Normal range of motion and neck supple.      Right lower leg: No edema.      Left lower leg: No edema.      Comments: No point tenderness along the spine, no spinal deformity; minimal degenerative changes on lumbar spine x-ray in recent past.  Protective Sensation:  Right: Intact  Left: Intact    Visual Inspection:  Normal -  Bilateral    Pedal Pulses:   Right: Present  Left: Present    Posterior Tibialis Pulses:   Right:Present  Left: Present       Lymphadenopathy:      Cervical: No cervical adenopathy.   Skin:     General: Skin is warm and dry.      Coloration: Skin is not pale.      Findings: No erythema or rash.      Nails: There is no clubbing.   Neurological:      General: No focal deficit present.      Mental Status: She is alert and oriented to person, place, and time.      Cranial Nerves: No cranial nerve deficit.      Motor: No weakness or abnormal muscle tone.      Coordination: Coordination normal.      Gait: Gait normal.   Psychiatric:         Mood and Affect: Mood and affect normal.         Speech: Speech normal.         Behavior: Behavior normal.         Thought Content: Thought content normal.         Judgment: Judgment normal.      Comments: In good spirits!     No visits with results within 3 Week(s) from this visit.   Latest known visit with results is:   Lab Visit on  09/16/2024   Component Date Value    Hemoglobin A1C 09/16/2024 6.1 (H)     Estimated Avg Glucose 09/16/2024 128     Total Protein 09/16/2024 7.5     Albumin 09/16/2024 3.9     Total Bilirubin 09/16/2024 0.6     Bilirubin, Direct 09/16/2024 0.3     AST 09/16/2024 67 (H)     ALT 09/16/2024 55 (H)     Alkaline Phosphatase 09/16/2024 237 (H)     Anti-Mitochon Ab IFA 09/16/2024 Negative 1:40     Smooth Muscle Ab 09/16/2024 Negative 1:40     IgG 09/16/2024 1224     IgM 09/16/2024 95     Hepatitis A Antibody IgG 09/16/2024 Non-reactive     Hep B Core Total Ab 09/16/2024 Non-reactive     Hep. B Surf Ab, Qual 09/16/2024 Negative     Hep. B Surf Ab, Quant. 09/16/2024 3     PEth 16:0/18.1 (POPEth) 09/16/2024 1270     PEth 16:0/18.2 (PLPEth) 09/16/2024 1044     PETH INTERPRETATION 09/16/2024 Positive.         Assessment and Plan     1. Routine medical exam  -     CBC Auto Differential; Future; Expected date: 01/29/2025  -     Basic Metabolic Panel; Future; Expected date: 01/29/2025  -     Lipid Panel; Future; Expected date: 01/29/2025    2. Type 2 diabetes mellitus without complication, without long-term current use of insulin  -     Hemoglobin A1C; Future; Expected date: 01/29/2025  -     Microalbumin/Creatinine Ratio, Urine    3. Primary hypertension - fair control, continue same.   -     amLODIPine (NORVASC) 10 MG tablet; Take 1 tablet (10 mg total) by mouth once daily.  Dispense: 90 tablet; Refill: 1  -     losartan (COZAAR) 25 MG tablet; Take 1 tablet (25 mg total) by mouth once daily. For Blood Pressure.  Dispense: 90 tablet; Refill: 1    4. Hyperlipidemia associated with type 2 diabetes mellitus        -     atorvastatin order upon review of results.    5. Chronic midline low back pain with bilateral sciatica  -     gabapentin (NEURONTIN) 300 MG capsule; Take 1 capsule (300 mg total) by mouth every evening. For nerve pain.  Dispense: 90 capsule; Refill: 1    6. Gastroesophageal reflux disease without  esophagitis  -     pantoprazole (PROTONIX) 20 MG tablet; Take 1 tablet (20 mg total) by mouth once daily.  Dispense: 90 tablet; Refill: 1    7. Hypomagnesemia  -     Magnesium; Future; Expected date: 01/29/2025    8. Vitamin D deficiency  -     Vitamin D; Future; Expected date: 01/29/2025    9. Osteoporosis screening  -     DXA Bone Density Axial Skeleton 1 or more sites; Future; Expected date: 01/29/2025    10. Encounter for screening mammogram for breast cancer  -     Mammo Digital Screening Bilat w/ Torsten; Future - due in March.    11. Need for pneumococcal vaccine - Prevnar 20 today.     12. Influenza vaccine needed - Flu shot today.     Shingrix deferred to a later date.        Follow up in about 6 months (around 7/29/2025) for Follow Up..

## 2025-02-03 DIAGNOSIS — E11.9 TYPE 2 DIABETES MELLITUS WITHOUT COMPLICATION, WITHOUT LONG-TERM CURRENT USE OF INSULIN: ICD-10-CM

## 2025-02-03 DIAGNOSIS — E83.42 HYPOMAGNESEMIA: ICD-10-CM

## 2025-02-03 DIAGNOSIS — E78.5 HYPERLIPIDEMIA ASSOCIATED WITH TYPE 2 DIABETES MELLITUS: ICD-10-CM

## 2025-02-03 DIAGNOSIS — F51.04 PSYCHOPHYSIOLOGICAL INSOMNIA: ICD-10-CM

## 2025-02-03 DIAGNOSIS — E55.9 VITAMIN D DEFICIENCY: ICD-10-CM

## 2025-02-03 DIAGNOSIS — E11.69 HYPERLIPIDEMIA ASSOCIATED WITH TYPE 2 DIABETES MELLITUS: ICD-10-CM

## 2025-02-03 RX ORDER — ZOLPIDEM TARTRATE 5 MG/1
5 TABLET ORAL NIGHTLY PRN
Qty: 20 TABLET | Refills: 1 | Status: CANCELLED | OUTPATIENT
Start: 2025-02-03

## 2025-02-04 ENCOUNTER — OFFICE VISIT (OUTPATIENT)
Dept: URGENT CARE | Facility: CLINIC | Age: 55
End: 2025-02-04
Payer: COMMERCIAL

## 2025-02-04 VITALS
TEMPERATURE: 98 F | SYSTOLIC BLOOD PRESSURE: 136 MMHG | WEIGHT: 158 LBS | DIASTOLIC BLOOD PRESSURE: 81 MMHG | BODY MASS INDEX: 28 KG/M2 | HEART RATE: 81 BPM | HEIGHT: 63 IN | RESPIRATION RATE: 16 BRPM | OXYGEN SATURATION: 98 %

## 2025-02-04 DIAGNOSIS — M54.41 ACUTE MIDLINE LOW BACK PAIN WITH RIGHT-SIDED SCIATICA: Primary | ICD-10-CM

## 2025-02-04 PROCEDURE — 99214 OFFICE O/P EST MOD 30 MIN: CPT | Mod: 25,S$GLB,, | Performed by: FAMILY MEDICINE

## 2025-02-04 PROCEDURE — 96372 THER/PROPH/DIAG INJ SC/IM: CPT | Mod: S$GLB,,, | Performed by: FAMILY MEDICINE

## 2025-02-04 RX ORDER — NAPROXEN 500 MG/1
500 TABLET ORAL 2 TIMES DAILY WITH MEALS
Qty: 20 TABLET | Refills: 0 | Status: SHIPPED | OUTPATIENT
Start: 2025-02-04 | End: 2025-02-14

## 2025-02-04 RX ORDER — METHOCARBAMOL 500 MG/1
500 TABLET, FILM COATED ORAL NIGHTLY PRN
Qty: 10 TABLET | Refills: 0 | Status: SHIPPED | OUTPATIENT
Start: 2025-02-04 | End: 2025-02-14

## 2025-02-04 RX ORDER — KETOROLAC TROMETHAMINE 30 MG/ML
30 INJECTION, SOLUTION INTRAMUSCULAR; INTRAVENOUS ONCE
Status: COMPLETED | OUTPATIENT
Start: 2025-02-04 | End: 2025-02-04

## 2025-02-04 RX ADMIN — KETOROLAC TROMETHAMINE 30 MG: 30 INJECTION, SOLUTION INTRAMUSCULAR; INTRAVENOUS at 11:02

## 2025-02-04 NOTE — TELEPHONE ENCOUNTER
No care due was identified.  [facility] Embedded Care Due Messages. Reference number: [id number].   2/03/2025 7:33:15 PM CST

## 2025-02-04 NOTE — PROGRESS NOTES
"Subjective:      Patient ID: Joan Wetzel is a 54 y.o. female.    Vitals:  height is 5' 2.99" (1.6 m) and weight is 71.7 kg (158 lb). Her oral temperature is 97.7 °F (36.5 °C). Her blood pressure is 136/81 and her pulse is 81. Her respiration is 16 and oxygen saturation is 98%.     Chief Complaint: Back Pain (2 week history, no direct injuries identified)    55 yo female ex-smoker presenting with a 2 week history of lumbar and sacroiliac back pain radiating to the right lower extremity. She presents with a past medical history of vitamin D deficiency, GERD, hypertension, pancreatitis, and T2DM. She denies any fevers, chills, urinary/fecal incontinence, paresthesias, blood clots, PAD, or direct back injury. She endorses mild pain relief with heat pads but has not experienced pain relief with OTC analgesia.     Back Pain  This is a new problem. The current episode started 1 to 4 weeks ago. The problem occurs constantly. The problem is unchanged. The pain is present in the lumbar spine and sacro-iliac. The quality of the pain is described as shooting. The pain radiates to the right knee and right thigh. The pain is at a severity of 10/10. The pain is severe. The pain is The same all the time. The symptoms are aggravated by bending and standing. Associated symptoms include leg pain and weakness. Pertinent negatives include no abdominal pain, bladder incontinence, bowel incontinence, fever, paresthesias or tingling. Risk factors include history of osteoporosis and recent trauma (Possible injury identified being when she was lifting a sandbag but no immediate pain). She has tried heat, analgesics and NSAIDs for the symptoms. The treatment provided mild relief.       Constitution: Negative for fever.   Gastrointestinal:  Negative for abdominal pain and bowel incontinence.   Genitourinary:  Negative for bladder incontinence.   Musculoskeletal:  Positive for back pain.      Objective:     Physical Exam   Constitutional: " She is oriented to person, place, and time. She appears well-developed. She is cooperative. No distress.   HENT:   Head: Normocephalic and atraumatic.   Nose: Nose normal.   Mouth/Throat: Oropharynx is clear and moist and mucous membranes are normal.   Eyes: Conjunctivae and lids are normal.   Neck: Trachea normal and phonation normal. Neck supple.   Cardiovascular: Normal rate, regular rhythm, normal heart sounds and normal pulses.   Pulmonary/Chest: Effort normal and breath sounds normal.   Abdominal: Normal appearance and bowel sounds are normal. She exhibits no mass. Soft.   Musculoskeletal:         General: No deformity.      Lumbar back: She exhibits decreased range of motion and tenderness. She exhibits no bony tenderness, no edema and no spasm.   Neurological: She is alert and oriented to person, place, and time. She has normal strength and normal reflexes. No sensory deficit.   Skin: Skin is warm, dry, intact and not diaphoretic.   Psychiatric: Her speech is normal and behavior is normal. Judgment and thought content normal.   Nursing note and vitals reviewed.      Assessment:     1. Acute midline low back pain with right-sided sciatica        Plan:       Acute midline low back pain with right-sided sciatica  -     ketorolac injection 30 mg  -     naproxen (NAPROSYN) 500 MG tablet; Take 1 tablet (500 mg total) by mouth 2 (two) times daily with meals. for 10 days  Dispense: 20 tablet; Refill: 0  -     methocarbamoL (ROBAXIN) 500 MG Tab; Take 1 tablet (500 mg total) by mouth nightly as needed (muscle spasm).  Dispense: 10 tablet; Refill: 0  -     Ambulatory referral/consult to Orthopedics             Patient Instructions   Start Naproxen tomorrow morning  Apply heat to back  Follow-up with orthopedist if no improvement    You must understand that you have received treatment at an Urgent Care facility only, and that you may be  released before all of your medical problems are known or treated. Urgent Care  facilities are not equipped to  handle life threatening emergencies. It is recommended that you seek care at an Emergency Department for  further evaluation of worsening or concerning symptoms, or possibly life threatening conditions as  discussed.      If you develop chest pain, shortness of breath, throat swelling, tongue swelling, lightheadedness or any other causes for concern, proceed to ER.

## 2025-02-04 NOTE — PATIENT INSTRUCTIONS
Start Naproxen tomorrow morning  Apply heat to back  Follow-up with orthopedist if no improvement    You must understand that you have received treatment at an Urgent Care facility only, and that you may be  released before all of your medical problems are known or treated. Urgent Care facilities are not equipped to  handle life threatening emergencies. It is recommended that you seek care at an Emergency Department for  further evaluation of worsening or concerning symptoms, or possibly life threatening conditions as  discussed.      If you develop chest pain, shortness of breath, throat swelling, tongue swelling, lightheadedness or any other causes for concern, proceed to ER.

## 2025-02-04 NOTE — LETTER
February 4, 2025      [facility] [address] [place] [facility] [address]  Phone: [phone]  Fax: [phone]       Patient: Joan Wetzel   YOB: 1970  Date of Visit: 02/04/2025    To Whom It May Concern:    Aidan Wetzel  was at [facility] on 02/04/2025. The patient may return to work/school on 02/06/2025 with no restrictions. If you have any questions or concerns, or if I can be of further assistance, please do not hesitate to contact me.    Sincerely,    Christine Dejesus MD

## 2025-02-05 RX ORDER — ERGOCALCIFEROL 1.25 MG/1
50000 CAPSULE ORAL
Qty: 12 CAPSULE | Refills: 3 | Status: SHIPPED | OUTPATIENT
Start: 2025-02-05

## 2025-02-05 RX ORDER — METFORMIN HYDROCHLORIDE 500 MG/1
500 TABLET ORAL
Qty: 90 TABLET | Refills: 1 | Status: SHIPPED | OUTPATIENT
Start: 2025-02-05

## 2025-02-05 RX ORDER — CALCIUM CARBONATE 300MG(750)
400 TABLET,CHEWABLE ORAL DAILY
Qty: 90 TABLET | Refills: 1 | Status: SHIPPED | OUTPATIENT
Start: 2025-02-05

## 2025-02-05 RX ORDER — GLIMEPIRIDE 2 MG/1
2 TABLET ORAL
Qty: 90 TABLET | Refills: 1 | Status: SHIPPED | OUTPATIENT
Start: 2025-02-05

## 2025-02-05 RX ORDER — ATORVASTATIN CALCIUM 20 MG/1
20 TABLET, FILM COATED ORAL DAILY
Qty: 90 TABLET | Refills: 3 | Status: SHIPPED | OUTPATIENT
Start: 2025-02-05

## 2025-02-17 DIAGNOSIS — F51.04 PSYCHOPHYSIOLOGICAL INSOMNIA: ICD-10-CM

## 2025-02-17 RX ORDER — ZOLPIDEM TARTRATE 5 MG/1
5 TABLET ORAL NIGHTLY PRN
Qty: 20 TABLET | Refills: 1 | Status: SHIPPED | OUTPATIENT
Start: 2025-02-17

## 2025-04-01 ENCOUNTER — ON-DEMAND VIRTUAL (OUTPATIENT)
Dept: URGENT CARE | Facility: CLINIC | Age: 55
End: 2025-04-01
Payer: COMMERCIAL

## 2025-04-01 DIAGNOSIS — R11.0 NAUSEA: Primary | ICD-10-CM

## 2025-04-01 RX ORDER — ONDANSETRON 4 MG/1
4 TABLET, ORALLY DISINTEGRATING ORAL EVERY 8 HOURS PRN
Qty: 20 TABLET | Refills: 0 | Status: SHIPPED | OUTPATIENT
Start: 2025-04-01

## 2025-04-01 NOTE — LETTER
April 1, 2025    Joan Wetzel  [address] [facility] [address]             Virtual Visit - Urgent Care  Urgent Care  [address] [place] [address]   April 1, 2025     Patient: Joan Wetzel   YOB: 1970   Date of Visit: 4/1/2025       To Whom it May Concern:    Joan Wetzel was seen virtually on 4/1/2025. She may return to work on 4/1/25 .    Please excuse her from any classes or work missed from 3/26- 3/31/25.    If you have any questions or concerns, please don't hesitate to call.    Sincerely,         Luisa Pope MD

## 2025-04-01 NOTE — PROGRESS NOTES
Subjective:      Patient ID: Joan Wetzel is a 54 y.o. female.    Vitals:  vitals were not taken for this visit.     Chief Complaint: Nausea      Visit Type: TELE AUDIOVISUAL - This visit was conducted virtually based on  subjective information and limited objective exam    Present with the patient at the time of consultation: TELEMED PRESENT WITH PATIENT: None  LOCATION OF PATIENT [place], [place]  Two patient identifiers used to verify patient- saying out date of birth and full name.       Past Medical History:   Diagnosis Date    GERD (gastroesophageal reflux disease)     Hyperlipidemia associated with type 2 diabetes mellitus     Hypertension     Mental disorder     Pancreatitis     Type 2 diabetes mellitus     Vitamin D deficiency      Past Surgical History:   Procedure Laterality Date     SECTION      COLONOSCOPY N/A 3/18/2021    Procedure: COLONOSCOPY;  Surgeon: Celestino Walker MD;  Location: [facility] ENDO (4TH FLR);  Service: Endoscopy;  Laterality: N/A;  covid screening PCW 3/15 - christen    ENDOMETRIAL ABLATION      ENDOSCOPIC ULTRASOUND OF UPPER GASTROINTESTINAL TRACT N/A 2019    Procedure: ULTRASOUND, UPPER GI TRACT, ENDOSCOPIC;  Surgeon: Kristian Wakefield MD;  Location: [facility] (2ND FLR);  Service: Endoscopy;  Laterality: N/A;  To be done end of  along with EGD evaluation to r/o PUD.    ENDOSCOPIC ULTRASOUND OF UPPER GASTROINTESTINAL TRACT N/A 2019    Procedure: ULTRASOUND, UPPER GI TRACT, ENDOSCOPIC;  Surgeon: Po Noonan MD;  Location: [facility] ENDO (2ND FLR);  Service: Endoscopy;  Laterality: N/A;  CT still shows a spot in the pancreas. It has not changed, and likely just represents an area of inflammation, but I think we should take another look with EUS.  Dr SHERLEY Wakefield  PM prep - pg  10/31/19 appt confirmed-rb    ENDOSCOPIC ULTRASOUND OF UPPER GASTROINTESTINAL TRACT N/A 10/15/2021    Procedure: ULTRASOUND, UPPER GI TRACT, ENDOSCOPIC;  Surgeon: Rick Mooney MD;  Location: [facility]  (2ND FLR);  Service: Endoscopy;  Laterality: N/A;  Covid-19 test 10/12 [facility]-instructions sent via portal 10/5-MG    ESOPHAGOGASTRODUODENOSCOPY N/A 6/24/2019    Procedure: EGD (ESOPHAGOGASTRODUODENOSCOPY);  Surgeon: Kristian Wakefield MD;  Location: [facility] ENDO (2ND FLR);  Service: Endoscopy;  Laterality: N/A;    LAPAROSCOPIC CHOLECYSTECTOMY N/A 7/24/2019    Procedure: CHOLECYSTECTOMY, LAPAROSCOPIC;  Surgeon: Huber Neri MD;  Location: [facility] OR 2ND FLR;  Service: General;  Laterality: N/A;    TUBAL LIGATION  04/1994    upper gi       Review of patient's allergies indicates:   Allergen Reactions    Pineapple Swelling    Hydrochlorothiazide Other (See Comments)     CAUSING PANCREATITIS      Medications Ordered Prior to Encounter[1]  Family History   Problem Relation Name Age of Onset    Heart failure Mother      Stroke Mother      Aneurysm Mother      Alcohol abuse Mother      Hypertension Father      Alcohol abuse Father      Liver disease Father      Diabetes Maternal Aunt      Stomach cancer Maternal Aunt      Diabetes Maternal Uncle      Heart disease Maternal Uncle      Lung cancer Maternal Uncle      Coronary artery disease Paternal Grandmother      Dementia Paternal Grandmother      Dementia Paternal Grandfather      Colon cancer Neg Hx      Esophageal cancer Neg Hx      Ovarian cancer Neg Hx      Breast cancer Neg Hx      Amblyopia Neg Hx      Blindness Neg Hx      Cancer Neg Hx      Cataracts Neg Hx      Glaucoma Neg Hx      Macular degeneration Neg Hx      Retinal detachment Neg Hx      Strabismus Neg Hx      Thyroid disease Neg Hx         Medications Ordered                [id number] - [place], [place] - [address]   [address] [facility] [place] [address]    Telephone: [phone]   Fax: [phone]   Hours: Not open 24 hours                         E-Prescribed (1 of 1)              ondansetron (ZOFRAN-ODT) 4 MG TbDL    Sig: Take 1 tablet (4 mg total) by mouth every 8 (eight)  hours as needed (nausea).       Start: 4/1/25     Quantity: 20 tablet Refills: 0                           Ohs Peq Odvv Intake    4/1/2025  8:27 AM CDT - Filed by Patient   What is your current physical address in the event of a medical emergency? [address]   Are you able to take your vital signs? No   Please attach any relevant images or files    Is your employer contracted with [facility]? No         53 yo female c/o not feeling well since Wednesday.  Reports nausea, NBNB diarrhea and abd pain from Wednesday till yesterday.   Reports dec appetitie due to persistent nausea.   Has taken tylenol and imodium for diarrhea, reports diarrhea has resolved.   Denies fever, chills, vomiting, diarrhea, abd pain, reflux.         Constitution: Positive for appetite change. Negative for activity change, chills, fatigue, fever, unexpected weight change and generalized weakness.   HENT:  Negative for postnasal drip, sinus pain and sinus pressure.    Cardiovascular:  Negative for chest pain and palpitations.   Respiratory:  Negative for cough and sputum production.    Gastrointestinal:  Positive for nausea. Negative for abdominal pain, vomiting, constipation and diarrhea.   Genitourinary:  Negative for pelvic pain.        Objective:   The physical exam was conducted virtually.    AAO x 3 ; no acute distress noted; appearance normal; mood and behavior normal; thought process normal  Head- normocephalic  Nose- appears normal, no discharge or erythema  Eyes- pupils appear normal in size, no drainage, no erythema  Ears- normal appearing; no discharge, no erythema  Mouth- appears normal  Lungs- breathing at a normal rate, no acute distress noted  Heart- no reports of tachycardia, palpitations, chest pain  Abdomen- non distended, non tender reported by patient  Skin- warm and dry, no erythema or edema noted by patient or visualized  Psych- as above; no si/hi      Assessment:     1. Nausea        Plan:         Thank you for  choosing [facility]!    Our goal in the [facility] is to always provide outstanding medical care. You may receive a survey by mail or e-mail in the next week regarding your experience today. We would greatly appreciate you completing and returning the survey. Your feedback provides us with a way to recognize our staff who provide very good care, and it helps us learn how to improve when your experience was below our aspiration of excellence.         We appreciate you trusting us with your medical care. We hope you feel better soon. We will be happy to take care of you for all of your future medical needs.    You must understand that you've received an Urgent Care treatment only and that you may be released before all your medical problems are known or treated. You, the patient, will arrange for follow up care as instructed.    Follow up with your PCP or specialty clinic as directed in the next 1-2 weeks if not improved or as needed.  You can call [phone] to schedule an appointment with the appropriate provider.    If your condition worsens we recommend that you receive another evaluation in person, with your primary care provider, urgent care or at the emergency room immediately or contact your primary medical clinics after hours call service to discuss your concerns.         Nausea  -     ondansetron (ZOFRAN-ODT) 4 MG TbDL; Take 1 tablet (4 mg total) by mouth every 8 (eight) hours as needed (nausea).  Dispense: 20 tablet; Refill: 0                         [1]   Current Outpatient Medications on File Prior to Visit   Medication Sig Dispense Refill    amLODIPine (NORVASC) 10 MG tablet Take 1 tablet (10 mg total) by mouth once daily. 90 tablet 1    atorvastatin (LIPITOR) 20 MG tablet Take 1 tablet (20 mg total) by mouth once daily. 90 tablet 3    ergocalciferol (ERGOCALCIFEROL) 50,000 unit Cap Take 1 capsule (50,000 Units total) by mouth every 7 days. 12 capsule 3     fluocinonide 0.05% (LIDEX) 0.05 % cream Apply topically 2 (two) times daily. Prn itch. 60 g 0    gabapentin (NEURONTIN) 300 MG capsule Take 1 capsule (300 mg total) by mouth every evening. For nerve pain. 90 capsule 1    glimepiride (AMARYL) 2 MG tablet Take 1 tablet (2 mg total) by mouth before breakfast. 90 tablet 1    losartan (COZAAR) 25 MG tablet Take 1 tablet (25 mg total) by mouth once daily. For Blood Pressure. 90 tablet 1    magnesium oxide 400 mg magnesium Tab Take 400 mg by mouth once daily. 90 tablet 1    metFORMIN (GLUCOPHAGE) 500 MG tablet Take 1 tablet (500 mg total) by mouth daily with breakfast. 90 tablet 1    pantoprazole (PROTONIX) 20 MG tablet Take 1 tablet (20 mg total) by mouth once daily. 90 tablet 1    pneumoc 20-cathie conj-dip cr,PF, (PREVNAR 20, PF,) 0.5 mL Syrg injection Inject into the muscle. 0.5 mL 0    zolpidem (AMBIEN) 5 MG Tab Take 1 tablet (5 mg total) by mouth nightly as needed (Insomnia.). 20 tablet 1     No current facility-administered medications on file prior to visit.

## 2025-04-02 ENCOUNTER — HOSPITAL ENCOUNTER (OUTPATIENT)
Facility: OTHER | Age: 55
Discharge: HOME | End: 2025-04-03
Attending: EMERGENCY MEDICINE | Admitting: EMERGENCY MEDICINE
Payer: COMMERCIAL

## 2025-04-02 DIAGNOSIS — K86.1 OTHER CHRONIC PANCREATITIS: Primary | ICD-10-CM

## 2025-04-02 DIAGNOSIS — K86.2 PANCREATIC CYST: ICD-10-CM

## 2025-04-02 DIAGNOSIS — R11.2 NAUSEA AND VOMITING, UNSPECIFIED VOMITING TYPE: ICD-10-CM

## 2025-04-02 LAB
ABSOLUTE EOSINOPHIL (OHS): 0 K/UL
ABSOLUTE MONOCYTE (OHS): 0.31 K/UL (ref 0.3–1)
ABSOLUTE NEUTROPHIL COUNT (OHS): 2.76 K/UL (ref 1.8–7.7)
ALBUMIN SERPL BCP-MCNC: 4 G/DL (ref 3.5–5.2)
ALP SERPL-CCNC: 304 UNIT/L (ref 40–150)
ALT SERPL W/O P-5'-P-CCNC: 61 UNIT/L (ref 10–44)
ANION GAP (OHS): 14 MMOL/L (ref 8–16)
AST SERPL-CCNC: 103 UNIT/L (ref 11–45)
BACTERIA #/AREA URNS AUTO: ABNORMAL /HPF
BASOPHILS # BLD AUTO: 0.04 K/UL
BASOPHILS NFR BLD AUTO: 1.1 %
BILIRUB SERPL-MCNC: 0.9 MG/DL (ref 0.1–1)
BILIRUB UR QL STRIP.AUTO: ABNORMAL
BUN SERPL-MCNC: 9 MG/DL (ref 6–20)
CALCIUM SERPL-MCNC: 9.9 MG/DL (ref 8.7–10.5)
CAOX CRY UR QL COMP ASSIST: ABNORMAL
CHLORIDE SERPL-SCNC: 100 MMOL/L (ref 95–110)
CLARITY UR: CLEAR
CO2 SERPL-SCNC: 24 MMOL/L (ref 23–29)
COLOR UR AUTO: YELLOW
CREAT SERPL-MCNC: 0.8 MG/DL (ref 0.5–1.4)
ERYTHROCYTE [DISTWIDTH] IN BLOOD BY AUTOMATED COUNT: 12.6 % (ref 11.5–14.5)
GFR SERPLBLD CREATININE-BSD FMLA CKD-EPI: >60 ML/MIN/1.73/M2
GLUCOSE SERPL-MCNC: 158 MG/DL (ref 70–110)
GLUCOSE UR QL STRIP: ABNORMAL
HCT VFR BLD AUTO: 38.7 % (ref 37–48.5)
HCV AB SERPL QL IA: NEGATIVE
HGB BLD-MCNC: 13.6 GM/DL (ref 12–16)
HGB UR QL STRIP: NEGATIVE
HIV 1+2 AB+HIV1 P24 AG SERPL QL IA: NEGATIVE
HYALINE CASTS UR QL AUTO: 40 /LPF (ref 0–1)
IMM GRANULOCYTES # BLD AUTO: 0.02 K/UL (ref 0–0.04)
IMM GRANULOCYTES NFR BLD AUTO: 0.5 % (ref 0–0.5)
KETONES UR QL STRIP: ABNORMAL
LEUKOCYTE ESTERASE UR QL STRIP: NEGATIVE
LIPASE SERPL-CCNC: 56 U/L (ref 4–60)
LYMPHOCYTES # BLD AUTO: 0.66 K/UL (ref 1–4.8)
MCH RBC QN AUTO: 35.1 PG (ref 27–31)
MCHC RBC AUTO-ENTMCNC: 35.1 G/DL (ref 32–36)
MCV RBC AUTO: 100 FL (ref 82–98)
MICROSCOPIC COMMENT: ABNORMAL
NITRITE UR QL STRIP: NEGATIVE
NUCLEATED RBC (/100WBC) (OHS): 0 /100 WBC
PH UR STRIP: 6 [PH]
PLATELET # BLD AUTO: 213 K/UL (ref 150–450)
PMV BLD AUTO: 10.8 FL (ref 9.2–12.9)
POCT GLUCOSE: 115 MG/DL (ref 70–110)
POTASSIUM SERPL-SCNC: 3.9 MMOL/L (ref 3.5–5.1)
PROT SERPL-MCNC: 7.7 GM/DL (ref 6–8.4)
PROT UR QL STRIP: ABNORMAL
RBC # BLD AUTO: 3.88 M/UL (ref 4–5.4)
RBC #/AREA URNS AUTO: 0 /HPF (ref 0–4)
RELATIVE EOSINOPHIL (OHS): 0 %
RELATIVE LYMPHOCYTE (OHS): 17.4 % (ref 18–48)
RELATIVE MONOCYTE (OHS): 8.2 % (ref 4–15)
RELATIVE NEUTROPHIL (OHS): 72.8 % (ref 38–73)
SODIUM SERPL-SCNC: 138 MMOL/L (ref 136–145)
SP GR UR STRIP: >=1.03
SQUAMOUS #/AREA URNS AUTO: 6 /HPF
UROBILINOGEN UR STRIP-ACNC: >=8 EU/DL
WBC # BLD AUTO: 3.79 K/UL (ref 3.9–12.7)
WBC #/AREA URNS AUTO: 1 /HPF (ref 0–5)
YEAST UR QL AUTO: ABNORMAL /HPF

## 2025-04-02 PROCEDURE — 86803 HEPATITIS C AB TEST: CPT | Performed by: NURSE PRACTITIONER

## 2025-04-02 PROCEDURE — 25500020 PHARM REV CODE 255: Performed by: EMERGENCY MEDICINE

## 2025-04-02 PROCEDURE — G0378 HOSPITAL OBSERVATION PER HR: HCPCS

## 2025-04-02 PROCEDURE — 25000003 PHARM REV CODE 250: Performed by: NURSE PRACTITIONER

## 2025-04-02 PROCEDURE — 96374 THER/PROPH/DIAG INJ IV PUSH: CPT

## 2025-04-02 PROCEDURE — 81001 URINALYSIS AUTO W/SCOPE: CPT | Performed by: NURSE PRACTITIONER

## 2025-04-02 PROCEDURE — 99285 EMERGENCY DEPT VISIT HI MDM: CPT | Mod: 25

## 2025-04-02 PROCEDURE — 94761 N-INVAS EAR/PLS OXIMETRY MLT: CPT

## 2025-04-02 PROCEDURE — 87389 HIV-1 AG W/HIV-1&-2 AB AG IA: CPT | Performed by: NURSE PRACTITIONER

## 2025-04-02 PROCEDURE — 63600175 PHARM REV CODE 636 W HCPCS: Performed by: NURSE PRACTITIONER

## 2025-04-02 PROCEDURE — 84075 ASSAY ALKALINE PHOSPHATASE: CPT | Performed by: NURSE PRACTITIONER

## 2025-04-02 PROCEDURE — 96375 TX/PRO/DX INJ NEW DRUG ADDON: CPT

## 2025-04-02 PROCEDURE — 82962 GLUCOSE BLOOD TEST: CPT

## 2025-04-02 PROCEDURE — 96361 HYDRATE IV INFUSION ADD-ON: CPT

## 2025-04-02 PROCEDURE — 96376 TX/PRO/DX INJ SAME DRUG ADON: CPT

## 2025-04-02 PROCEDURE — 83690 ASSAY OF LIPASE: CPT | Performed by: NURSE PRACTITIONER

## 2025-04-02 PROCEDURE — 85025 COMPLETE CBC W/AUTO DIFF WBC: CPT | Performed by: NURSE PRACTITIONER

## 2025-04-02 RX ORDER — LIDOCAINE HYDROCHLORIDE 20 MG/ML
15 SOLUTION OROPHARYNGEAL ONCE
Status: COMPLETED | OUTPATIENT
Start: 2025-04-02 | End: 2025-04-02

## 2025-04-02 RX ORDER — MORPHINE SULFATE 4 MG/ML
4 INJECTION, SOLUTION INTRAMUSCULAR; INTRAVENOUS EVERY 4 HOURS PRN
Status: DISCONTINUED | OUTPATIENT
Start: 2025-04-02 | End: 2025-04-03 | Stop reason: HOSPADM

## 2025-04-02 RX ORDER — ALUMINUM HYDROXIDE, MAGNESIUM HYDROXIDE, AND SIMETHICONE 1200; 120; 1200 MG/30ML; MG/30ML; MG/30ML
30 SUSPENSION ORAL ONCE
Status: COMPLETED | OUTPATIENT
Start: 2025-04-02 | End: 2025-04-02

## 2025-04-02 RX ORDER — SODIUM CHLORIDE 0.9 % (FLUSH) 0.9 %
10 SYRINGE (ML) INJECTION
Status: DISCONTINUED | OUTPATIENT
Start: 2025-04-02 | End: 2025-04-03 | Stop reason: HOSPADM

## 2025-04-02 RX ORDER — ONDANSETRON HYDROCHLORIDE 2 MG/ML
4 INJECTION, SOLUTION INTRAVENOUS EVERY 6 HOURS PRN
Status: DISCONTINUED | OUTPATIENT
Start: 2025-04-02 | End: 2025-04-03 | Stop reason: HOSPADM

## 2025-04-02 RX ORDER — PANTOPRAZOLE SODIUM 40 MG/1
40 TABLET, DELAYED RELEASE ORAL DAILY
Status: DISCONTINUED | OUTPATIENT
Start: 2025-04-03 | End: 2025-04-03 | Stop reason: HOSPADM

## 2025-04-02 RX ORDER — ZOLPIDEM TARTRATE 5 MG/1
5 TABLET ORAL NIGHTLY PRN
Status: DISCONTINUED | OUTPATIENT
Start: 2025-04-02 | End: 2025-04-03 | Stop reason: HOSPADM

## 2025-04-02 RX ORDER — SODIUM CHLORIDE, SODIUM LACTATE, POTASSIUM CHLORIDE, CALCIUM CHLORIDE 600; 310; 30; 20 MG/100ML; MG/100ML; MG/100ML; MG/100ML
1000 INJECTION, SOLUTION INTRAVENOUS
Status: COMPLETED | OUTPATIENT
Start: 2025-04-02 | End: 2025-04-03

## 2025-04-02 RX ORDER — IBUPROFEN 200 MG
24 TABLET ORAL
Status: DISCONTINUED | OUTPATIENT
Start: 2025-04-02 | End: 2025-04-03 | Stop reason: HOSPADM

## 2025-04-02 RX ORDER — GABAPENTIN 300 MG/1
300 CAPSULE ORAL NIGHTLY
Status: DISCONTINUED | OUTPATIENT
Start: 2025-04-02 | End: 2025-04-03 | Stop reason: HOSPADM

## 2025-04-02 RX ORDER — TALC
6 POWDER (GRAM) TOPICAL NIGHTLY PRN
Status: DISCONTINUED | OUTPATIENT
Start: 2025-04-02 | End: 2025-04-03 | Stop reason: HOSPADM

## 2025-04-02 RX ORDER — LANOLIN ALCOHOL/MO/W.PET/CERES
400 CREAM (GRAM) TOPICAL DAILY
Status: DISCONTINUED | OUTPATIENT
Start: 2025-04-03 | End: 2025-04-03 | Stop reason: HOSPADM

## 2025-04-02 RX ORDER — ONDANSETRON HYDROCHLORIDE 2 MG/ML
4 INJECTION, SOLUTION INTRAVENOUS
Status: COMPLETED | OUTPATIENT
Start: 2025-04-02 | End: 2025-04-02

## 2025-04-02 RX ORDER — GLUCAGON 1 MG
1 KIT INJECTION
Status: DISCONTINUED | OUTPATIENT
Start: 2025-04-02 | End: 2025-04-03 | Stop reason: HOSPADM

## 2025-04-02 RX ORDER — AMLODIPINE BESYLATE 5 MG/1
10 TABLET ORAL DAILY
Status: DISCONTINUED | OUTPATIENT
Start: 2025-04-03 | End: 2025-04-03 | Stop reason: HOSPADM

## 2025-04-02 RX ORDER — HYDROMORPHONE HYDROCHLORIDE 1 MG/ML
0.5 INJECTION, SOLUTION INTRAMUSCULAR; INTRAVENOUS; SUBCUTANEOUS
Refills: 0 | Status: DISCONTINUED | OUTPATIENT
Start: 2025-04-02 | End: 2025-04-02

## 2025-04-02 RX ORDER — KETOROLAC TROMETHAMINE 30 MG/ML
10 INJECTION, SOLUTION INTRAMUSCULAR; INTRAVENOUS
Status: COMPLETED | OUTPATIENT
Start: 2025-04-02 | End: 2025-04-02

## 2025-04-02 RX ORDER — INSULIN ASPART 100 [IU]/ML
0-5 INJECTION, SOLUTION INTRAVENOUS; SUBCUTANEOUS
Status: DISCONTINUED | OUTPATIENT
Start: 2025-04-02 | End: 2025-04-03 | Stop reason: HOSPADM

## 2025-04-02 RX ORDER — IBUPROFEN 200 MG
16 TABLET ORAL
Status: DISCONTINUED | OUTPATIENT
Start: 2025-04-02 | End: 2025-04-03 | Stop reason: HOSPADM

## 2025-04-02 RX ORDER — HALOPERIDOL LACTATE 5 MG/ML
2.5 INJECTION, SOLUTION INTRAMUSCULAR
Status: COMPLETED | OUTPATIENT
Start: 2025-04-02 | End: 2025-04-02

## 2025-04-02 RX ORDER — ATORVASTATIN CALCIUM 20 MG/1
20 TABLET, FILM COATED ORAL DAILY
Status: DISCONTINUED | OUTPATIENT
Start: 2025-04-03 | End: 2025-04-03 | Stop reason: HOSPADM

## 2025-04-02 RX ADMIN — MORPHINE SULFATE 4 MG: 4 INJECTION INTRAVENOUS at 10:04

## 2025-04-02 RX ADMIN — LIDOCAINE HYDROCHLORIDE 15 ML: 20 SOLUTION ORAL at 12:04

## 2025-04-02 RX ADMIN — SODIUM CHLORIDE, POTASSIUM CHLORIDE, SODIUM LACTATE AND CALCIUM CHLORIDE 1000 ML: 600; 310; 30; 20 INJECTION, SOLUTION INTRAVENOUS at 05:04

## 2025-04-02 RX ADMIN — SODIUM CHLORIDE 1000 ML: 9 INJECTION, SOLUTION INTRAVENOUS at 03:04

## 2025-04-02 RX ADMIN — HALOPERIDOL LACTATE 2.5 MG: 5 INJECTION, SOLUTION INTRAMUSCULAR at 03:04

## 2025-04-02 RX ADMIN — ALUMINUM HYDROXIDE, MAGNESIUM HYDROXIDE, AND DIMETHICONE 30 ML: 200; 20; 200 SUSPENSION ORAL at 12:04

## 2025-04-02 RX ADMIN — ONDANSETRON 4 MG: 2 INJECTION INTRAMUSCULAR; INTRAVENOUS at 10:04

## 2025-04-02 RX ADMIN — MORPHINE SULFATE 4 MG: 4 INJECTION INTRAVENOUS at 05:04

## 2025-04-02 RX ADMIN — IOHEXOL 75 ML: 350 INJECTION, SOLUTION INTRAVENOUS at 02:04

## 2025-04-02 RX ADMIN — KETOROLAC TROMETHAMINE 10 MG: 30 INJECTION, SOLUTION INTRAMUSCULAR; INTRAVENOUS at 10:04

## 2025-04-02 NOTE — ED TRIAGE NOTES
Pt c/o abdominal pain, n/v for 3 days. Denies diarrhea or constipation. Denies urinary complaints.

## 2025-04-02 NOTE — Clinical Note
"Joan Murrieta" Rashard was seen and treated in our emergency department on 4/2/2025.  She may return to work on 04/07/2025.       If you have any questions or concerns, please don't hesitate to call.      Dixon Silva LPN    "

## 2025-04-02 NOTE — H&P
[facility] ED Observation Unit  History and Physical      I assumed care of this patient from the Emergency Department at onset of observation time, 4:30 PM  on 2025.       History of Present Illness:  54 y.o. female with a PMHx of GERD, HTN, HLD, DM, pancreatitis, and PSHx of a cholecystectomy presents to the ED for evaluation of sharp abdominal pain that radiates to the back x3 days. Pt reports associated vomiting that began yesterday, nausea, and loss of appetite. Pt denies any fever. She states that her current pain feels similar to a previous pancreatitis flare. Pt has not attempted any treatment for her symptoms. There are no other complaints at this time. Pt is allergic to HCTZ.        ED Course:  Labs are unremarkable. Liver enzymes are elevated at baseline.  CT reveals acute on chronic pancreatitis with slight increase in cyst at the head of the pancreas.  She also has a chronic appearing splenic vein thrombosis.  Pt continues to have N/V and she will be admitted to EDOU for fluids, antiemetics, and lab evaluation in the morning.     I reviewed the ED Provider Note dated 2025  prior to my evaluation of this patient.  I reviewed all labs and imaging performed in the Main ED, prior to patient being placed in Observation. Patient was placed in the ED Observation Unit for acute pancreas    PMHx   Past Medical History:   Diagnosis Date    GERD (gastroesophageal reflux disease)     Hyperlipidemia associated with type 2 diabetes mellitus     Hypertension     Mental disorder     Pancreatitis     Type 2 diabetes mellitus     Vitamin D deficiency       Past Surgical History:   Procedure Laterality Date     SECTION      COLONOSCOPY N/A 3/18/2021    Procedure: COLONOSCOPY;  Surgeon: Celestino Walker MD;  Location: [address];  Service: Endoscopy;  Laterality: N/A;  covid screening W 3/15 - christen    ENDOMETRIAL ABLATION      ENDOSCOPIC ULTRASOUND OF UPPER GASTROINTESTINAL TRACT N/A  6/24/2019    Procedure: ULTRASOUND, UPPER GI TRACT, ENDOSCOPIC;  Surgeon: Kristian Wakefield MD;  Location: [facility] ENDO (2ND FLR);  Service: Endoscopy;  Laterality: N/A;  To be done end of June along with EGD evaluation to r/o PUD.    ENDOSCOPIC ULTRASOUND OF UPPER GASTROINTESTINAL TRACT N/A 11/6/2019    Procedure: ULTRASOUND, UPPER GI TRACT, ENDOSCOPIC;  Surgeon: Po Noonan MD;  Location: [facility] ENDO (2ND FLR);  Service: Endoscopy;  Laterality: N/A;  CT still shows a spot in the pancreas. It has not changed, and likely just represents an area of inflammation, but I think we should take another look with EUS.  Dr SHERLEY Wakefield  PM prep - pg  10/31/19 appt confirmed-rb    ENDOSCOPIC ULTRASOUND OF UPPER GASTROINTESTINAL TRACT N/A 10/15/2021    Procedure: ULTRASOUND, UPPER GI TRACT, ENDOSCOPIC;  Surgeon: Rick Mooney MD;  Location: [facility] ENDO (2ND FLR);  Service: Endoscopy;  Laterality: N/A;  Covid-19 test 10/12 [facility]-instructions sent via portal 10/5-MG    ESOPHAGOGASTRODUODENOSCOPY N/A 6/24/2019    Procedure: EGD (ESOPHAGOGASTRODUODENOSCOPY);  Surgeon: Kristian Wakefield MD;  Location: [facility] ENDO (2ND FLR);  Service: Endoscopy;  Laterality: N/A;    LAPAROSCOPIC CHOLECYSTECTOMY N/A 7/24/2019    Procedure: CHOLECYSTECTOMY, LAPAROSCOPIC;  Surgeon: Huber Neri MD;  Location: [facility] OR 2ND FLR;  Service: General;  Laterality: N/A;    TUBAL LIGATION  04/1994    upper gi          Family Hx   Family History   Problem Relation Name Age of Onset    Heart failure Mother      Stroke Mother      Aneurysm Mother      Alcohol abuse Mother      Hypertension Father      Alcohol abuse Father      Liver disease Father      Diabetes Maternal Aunt      Stomach cancer Maternal Aunt      Diabetes Maternal Uncle      Heart disease Maternal Uncle      Lung cancer Maternal Uncle      Coronary artery disease Paternal Grandmother      Dementia Paternal Grandmother      Dementia Paternal Grandfather      Colon cancer Neg Hx      Esophageal cancer  Neg Hx      Ovarian cancer Neg Hx      Breast cancer Neg Hx      Amblyopia Neg Hx      Blindness Neg Hx      Cancer Neg Hx      Cataracts Neg Hx      Glaucoma Neg Hx      Macular degeneration Neg Hx      Retinal detachment Neg Hx      Strabismus Neg Hx      Thyroid disease Neg Hx          Social Hx   Social History     Socioeconomic History    Marital status:    Occupational History    Occupation: manager   Tobacco Use    Smoking status: Former     Current packs/day: 0.00     Types: Cigarettes     Quit date: 2019     Years since quittin.2    Smokeless tobacco: Never    Tobacco comments:     Quit    Substance and Sexual Activity    Alcohol use: Yes     Alcohol/week: 2.0 standard drinks of alcohol     Types: 2 Glasses of wine per week     Comment: social     Drug use: No    Sexual activity: Yes     Partners: Male     Birth control/protection: See Surgical Hx     Comment: tubal ligation in    Social History Narrative    , 3 adult children.  of an [employer] complex.      Social Drivers of Health     Financial Resource Strain: Medium Risk (2024)    Overall Financial Resource Strain (CARDIA)     Difficulty of Paying Living Expenses: Somewhat hard   Food Insecurity: Food Insecurity Present (2024)    Hunger Vital Sign     Worried About Running Out of Food in the Last Year: Sometimes true     Ran Out of Food in the Last Year: Sometimes true   Transportation Needs: No Transportation Needs (2024)    PRAPARE - Transportation     Lack of Transportation (Medical): No     Lack of Transportation (Non-Medical): No   Physical Activity: Unknown (2024)    Exercise Vital Sign     Days of Exercise per Week: 2 days   Recent Concern: Physical Activity - Insufficiently Active (2024)    Exercise Vital Sign     Days of Exercise per Week: 2 days     Minutes of Exercise per Session: 20 min   Stress: Stress Concern Present (2024)    [place] [employer] of  "Occupational Health - Occupational Stress Questionnaire     Feeling of Stress : Rather much   Housing Stability: Low Risk  (2/29/2024)    Housing Stability Vital Sign     Unable to Pay for Housing in the Last Year: No     Number of Places Lived in the Last Year: 1     Unstable Housing in the Last Year: No        Vital Signs   Vitals:    04/02/25 0924 04/02/25 1442 04/02/25 1649 04/02/25 1707   BP: (!) 143/68 (!) 143/76 132/75    Pulse: 63 73 72    Resp: (!) 22 18 20 20   Temp: 98.5 °F (36.9 °C) 98.1 °F (36.7 °C) 98.2 °F (36.8 °C)    TempSrc: Oral Oral Oral    SpO2: 95% 98% 98%    Weight: 66.7 kg (147 lb)      Height: 5' 2" (1.575 m)       04/02/25 2001   BP: (!) 150/69   Pulse: 71   Resp: 18   Temp: 98.2 °F (36.8 °C)   TempSrc: Oral   SpO2: 95%   Weight:    Height:        Review of Systems  Review of Systems   Constitutional:  Negative for chills and fever.   Respiratory:  Negative for cough and shortness of breath.    Cardiovascular:  Negative for chest pain.   Gastrointestinal:  Positive for abdominal pain, nausea and vomiting. Negative for diarrhea.   Neurological:  Negative for weakness and headaches.   All other systems reviewed and are negative.      Brief Physical Exam/Reassessment   Physical Exam    Nursing note and vitals reviewed.  Constitutional: She appears well-developed and well-nourished. She does not appear ill. No distress.   Neck: Neck supple.   Cardiovascular:  Normal rate, regular rhythm, S1 normal, S2 normal and normal heart sounds.           Pulmonary/Chest: Effort normal and breath sounds normal. No tachypnea. She has no decreased breath sounds. She has no wheezes.   Abdominal: Abdomen is soft and flat. There is abdominal tenderness in the epigastric area.   No right CVA tenderness.  No left CVA tenderness. There is no rebound, no guarding and negative Freeman's sign.   Musculoskeletal:      Cervical back: Neck supple.     Neurological: She is alert and oriented to person, place, and time. GCS " eye subscore is 4. GCS verbal subscore is 5. GCS motor subscore is 6.   Skin: Skin is warm, dry and intact.   Psychiatric: She has a normal mood and affect. Her behavior is normal.         Abnormal Labs Reviewed   COMPREHENSIVE METABOLIC PANEL - Abnormal; Notable for the following components:       Result Value    Glucose 158 (*)      (*)      (*)     ALT 61 (*)     All other components within normal limits   URINALYSIS, REFLEX TO URINE CULTURE - Abnormal; Notable for the following components:    Spec Grav UA >=1.030 (*)     Protein, UA 1+ (*)     Glucose, UA Trace (*)     Ketones, UA 2+ (*)     Bilirubin, UA 1+ (*)     Urobilinogen, UA >=8.0 (*)     All other components within normal limits   CBC WITH DIFFERENTIAL - Abnormal; Notable for the following components:    WBC 3.79 (*)     RBC 3.88 (*)      (*)     MCH 35.1 (*)     Lymph % 17.4 (*)     Lymph # 0.66 (*)     All other components within normal limits   URINALYSIS MICROSCOPIC - Abnormal; Notable for the following components:    Bacteria, UA Few (*)     Hyaline Casts, UA 40 (*)     All other components within normal limits       CT Abdomen Pelvis With IV Contrast NO Oral Contrast   Final Result      Findings suspicious for acute on chronic pancreatitis.  Interval development of a low-density focus at the pancreatic head, 2.1 cm, for which considerations would include pancreatic pseudocyst or cyst.  Recommend correlation with CT or MRI pancreas protocol in 6-12 weeks.      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      Hepatic steatosis.         Electronically signed by: Mikala Tsai   Date:    04/02/2025   Time:    15:19            Medications:   Scheduled Meds:   [START ON 4/3/2025] amLODIPine  10 mg Oral Daily    [START ON 4/3/2025] atorvastatin  20 mg Oral Daily    gabapentin  300 mg Oral QHS    [START ON 4/3/2025] magnesium oxide  400 mg Oral Daily    [START ON 4/3/2025]  pantoprazole  40 mg Oral Daily     Continuous Infusions:  PRN Meds:.  Current Facility-Administered Medications:     dextrose 50%, 12.5 g, Intravenous, PRN    dextrose 50%, 25 g, Intravenous, PRN    glucagon (human recombinant), 1 mg, Intramuscular, PRN    glucose, 16 g, Oral, PRN    glucose, 24 g, Oral, PRN    insulin aspart U-100, 0-5 Units, Subcutaneous, QID (AC + HS) PRN    melatonin, 6 mg, Oral, Nightly PRN    morphine, 4 mg, Intravenous, Q4H PRN    ondansetron, 4 mg, Intravenous, Q6H PRN    sodium chloride 0.9%, 10 mL, Intravenous, PRN    zolpidem, 5 mg, Oral, Nightly PRN      Assessment/Plan:    1. Acute pancreatitis     - pain control, IV fluids, labs in the AM       Case was discussed with the ED provider, Isaiah Burrows MD

## 2025-04-02 NOTE — ED PROVIDER NOTES
Encounter Date: 2025    SCRIBE #1 NOTE: IFarida am scribing for, and in the presence of,  AKI Mendez.       History     Chief Complaint   Patient presents with    Abdominal Pain     Pt reports upper abdominal pain radiating to her back x1 week. Pt endores loss of appetite  d/t N/V. Denies  symptoms.      54 y.o. female with a PMHx of GERD, HTN, HLD, DM, pancreatitis, and PSHx of a cholecystectomy presents to the ED for evaluation of sharp abdominal pain that radiates to the back x3 days. Pt reports associated vomiting that began yesterday, nausea, and loss of appetite. Pt denies any fever. She states that her current pain feels similar to a previous pancreatitis flare. Pt has not attempted any treatment for her symptoms. There are no other complaints at this time. Pt is allergic to HCTZ.    The history is provided by the patient. No  was used.     Review of patient's allergies indicates:   Allergen Reactions    Pineapple Swelling    Hydrochlorothiazide Other (See Comments)     CAUSING PANCREATITIS      Past Medical History:   Diagnosis Date    GERD (gastroesophageal reflux disease)     Hyperlipidemia associated with type 2 diabetes mellitus     Hypertension     Mental disorder     Pancreatitis     Type 2 diabetes mellitus     Vitamin D deficiency      Past Surgical History:   Procedure Laterality Date     SECTION      COLONOSCOPY N/A 3/18/2021    Procedure: COLONOSCOPY;  Surgeon: Celestino Walker MD;  Location: [facility] (4TH FLR);  Service: Endoscopy;  Laterality: N/A;  covid screening PCW 3/15 - christen    ENDOMETRIAL ABLATION      ENDOSCOPIC ULTRASOUND OF UPPER GASTROINTESTINAL TRACT N/A 2019    Procedure: ULTRASOUND, UPPER GI TRACT, ENDOSCOPIC;  Surgeon: Kristian Wakefield MD;  Location: [facility] (2ND FLR);  Service: Endoscopy;  Laterality: N/A;  To be done end of  along with EGD evaluation to r/o PUD.    ENDOSCOPIC ULTRASOUND OF UPPER GASTROINTESTINAL TRACT  N/A 11/6/2019    Procedure: ULTRASOUND, UPPER GI TRACT, ENDOSCOPIC;  Surgeon: Po Noonan MD;  Location: [facility] ENDO (2ND FLR);  Service: Endoscopy;  Laterality: N/A;  CT still shows a spot in the pancreas. It has not changed, and likely just represents an area of inflammation, but I think we should take another look with EUS.  Dr SHERLEY Wakefield  PM prep - pg  10/31/19 appt confirmed-rb    ENDOSCOPIC ULTRASOUND OF UPPER GASTROINTESTINAL TRACT N/A 10/15/2021    Procedure: ULTRASOUND, UPPER GI TRACT, ENDOSCOPIC;  Surgeon: Rick Mooney MD;  Location: [facility] ENDO (2ND FLR);  Service: Endoscopy;  Laterality: N/A;  Covid-19 test 10/12 [facility]-instructions sent via portal 10/5-MG    ESOPHAGOGASTRODUODENOSCOPY N/A 6/24/2019    Procedure: EGD (ESOPHAGOGASTRODUODENOSCOPY);  Surgeon: Kristian Wakefield MD;  Location: [facility] ENDO (2ND FLR);  Service: Endoscopy;  Laterality: N/A;    LAPAROSCOPIC CHOLECYSTECTOMY N/A 7/24/2019    Procedure: CHOLECYSTECTOMY, LAPAROSCOPIC;  Surgeon: Huber Neri MD;  Location: [facility] OR 2ND FLR;  Service: General;  Laterality: N/A;    TUBAL LIGATION  04/1994    upper gi       Family History   Problem Relation Name Age of Onset    Heart failure Mother      Stroke Mother      Aneurysm Mother      Alcohol abuse Mother      Hypertension Father      Alcohol abuse Father      Liver disease Father      Diabetes Maternal Aunt      Stomach cancer Maternal Aunt      Diabetes Maternal Uncle      Heart disease Maternal Uncle      Lung cancer Maternal Uncle      Coronary artery disease Paternal Grandmother      Dementia Paternal Grandmother      Dementia Paternal Grandfather      Colon cancer Neg Hx      Esophageal cancer Neg Hx      Ovarian cancer Neg Hx      Breast cancer Neg Hx      Amblyopia Neg Hx      Blindness Neg Hx      Cancer Neg Hx      Cataracts Neg Hx      Glaucoma Neg Hx      Macular degeneration Neg Hx      Retinal detachment Neg Hx      Strabismus Neg Hx      Thyroid disease Neg Hx       Social  History[1]  Review of Systems   Constitutional:  Positive for appetite change (decrease). Negative for fever.   HENT:  Negative for sore throat.    Respiratory:  Negative for shortness of breath.    Cardiovascular:  Negative for chest pain.   Gastrointestinal:  Positive for abdominal pain, nausea and vomiting.   Genitourinary:  Negative for dysuria.   Musculoskeletal:  Negative for back pain.   Skin:  Negative for rash.   Neurological:  Negative for weakness.   Hematological:  Does not bruise/bleed easily.   All other systems reviewed and are negative.      Physical Exam     Initial Vitals [04/02/25 0924]   BP Pulse Resp Temp SpO2   (!) 143/68 63 (!) 22 98.5 °F (36.9 °C) 95 %      MAP       --         Physical Exam    Nursing note and vitals reviewed.  Constitutional: She appears well-developed and well-nourished.   HENT:   Head: Normocephalic and atraumatic.   Dry oral mucous membranes   Eyes: Conjunctivae and EOM are normal. Pupils are equal, round, and reactive to light.   Neck:   Normal range of motion.  Cardiovascular:  Normal rate, regular rhythm, normal heart sounds and intact distal pulses.     Exam reveals no gallop and no friction rub.       No murmur heard.  Pulmonary/Chest: Breath sounds normal. No respiratory distress. She has no wheezes. She has no rhonchi. She has no rales. She exhibits no tenderness.   Abdominal: Abdomen is soft. Bowel sounds are normal. She exhibits no distension and no mass. There is abdominal tenderness in the epigastric area. There is no rebound and no guarding.   Musculoskeletal:         General: No tenderness or edema. Normal range of motion.      Cervical back: Normal range of motion.     Neurological: She is alert and oriented to person, place, and time. She has normal strength. GCS score is 15. GCS eye subscore is 4. GCS verbal subscore is 5. GCS motor subscore is 6.   Skin: Skin is warm. Capillary refill takes less than 2 seconds. No rash noted. No erythema.   Psychiatric:  She has a normal mood and affect.         ED Course   Procedures  Labs Reviewed   COMPREHENSIVE METABOLIC PANEL - Abnormal       Result Value    Sodium 138      Potassium 3.9      Chloride 100      CO2 24      Glucose 158 (*)     BUN 9      Creatinine 0.8      Calcium 9.9      Protein Total 7.7      Albumin 4.0      Bilirubin Total 0.9       (*)      (*)     ALT 61 (*)     Anion Gap 14      eGFR >60     URINALYSIS, REFLEX TO URINE CULTURE - Abnormal    Color, UA Yellow      Appearance, UA Clear      pH, UA 6.0      Spec Grav UA >=1.030 (*)     Protein, UA 1+ (*)     Glucose, UA Trace (*)     Ketones, UA 2+ (*)     Bilirubin, UA 1+ (*)     Blood, UA Negative      Nitrites, UA Negative      Urobilinogen, UA >=8.0 (*)     Leukocyte Esterase, UA Negative     CBC WITH DIFFERENTIAL - Abnormal    WBC 3.79 (*)     RBC 3.88 (*)     HGB 13.6      HCT 38.7       (*)     MCH 35.1 (*)     MCHC 35.1      RDW 12.6      Platelet Count 213      MPV 10.8      Nucleated RBC 0      Neut % 72.8      Lymph % 17.4 (*)     Mono % 8.2      Eos % 0.0      Basophil % 1.1      Imm Grans % 0.5      Neut # 2.76      Lymph # 0.66 (*)     Mono # 0.31      Eos # 0.00      Baso # 0.04      Imm Grans # 0.02     URINALYSIS MICROSCOPIC - Abnormal    RBC, UA 0      WBC, UA 1      Bacteria, UA Few (*)     Yeast, UA None      Squamous Epithelial Cells, UA 6      Hyaline Casts, UA 40 (*)     Calcium Oxalate Crystals, UA Few      Microscopic Comment       HEPATITIS C ANTIBODY - Normal    Hep C Ab Interp Negative     HIV 1 / 2 ANTIBODY - Normal    HIV 1/2 Ag/Ab Negative     LIPASE - Normal    Lipase Level 56     CBC W/ AUTO DIFFERENTIAL    Narrative:     The following orders were created for panel order CBC W/ AUTO DIFFERENTIAL.  Procedure                               Abnormality         Status                     ---------                               -----------         ------                     CBC with Differential[[id number]]        Abnormal            Final result                 Please view results for these tests on the individual orders.          Imaging Results              CT Abdomen Pelvis With IV Contrast NO Oral Contrast (Final result)  Result time 04/02/25 15:19:57      Final result by Mikala Tsai MD (04/02/25 15:19:57)                   Impression:      Findings suspicious for acute on chronic pancreatitis.  Interval development of a low-density focus at the pancreatic head, 2.1 cm, for which considerations would include pancreatic pseudocyst or cyst.  Recommend correlation with CT or MRI pancreas protocol in 6-12 weeks.    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    Hepatic steatosis.      Electronically signed by: Mikala Tsai  Date:    04/02/2025  Time:    15:19               Narrative:    EXAMINATION:  CT ABDOMEN PELVIS WITH IV CONTRAST    CLINICAL HISTORY:  Epigastric pain;    TECHNIQUE:  Low dose axial images, sagittal and coronal reformations were obtained from the lung bases to the pubic symphysis following the IV administration of 75 mL of Omnipaque 350.    COMPARISON:  10/05/2022.    FINDINGS:  The lung bases are clear.    The liver parenchyma is of diffuse decreased attenuation.  Gallbladder is surgically absent.    Normal size spleen.    Mild haziness along the pancreatic head.  Multiple calcifications along the pancreatic parenchyma which can be seen with chronic pancreatitis.  Newly developed hypodensity in the pancreatic head estimated to measure 2.1 cm.  Splenic vein is likely thrombosed with several collateral vessels in the left upper quadrant.    Adrenal glands are normal.  Kidneys concentrate contrast appropriately.  The urinary bladder is unremarkable.    Suspected subserosal uterine leiomyomas.    Aorta is normal caliber.  No retroperitoneal or mesenteric lymph node enlargement seen.    Stomach and loops of bowel are normal caliber.   Visualized appendix is normal.  No significant free fluid in the pelvis.    Regional skeleton shows degenerative change.                                       Medications   sodium chloride 0.9% flush 10 mL (has no administration in time range)   melatonin tablet 6 mg (has no administration in time range)   morphine injection 4 mg (4 mg Intravenous Given 4/2/25 1707)   ondansetron injection 4 mg (has no administration in time range)   ondansetron injection 4 mg (4 mg Intravenous Given 4/2/25 1031)   ketorolac injection 9.999 mg (9.999 mg Intravenous Given 4/2/25 1031)   aluminum-magnesium hydroxide-simethicone 200-200-20 mg/5 mL suspension 30 mL (30 mLs Oral Given 4/2/25 1245)     And   LIDOcaine viscous HCl 2% oral solution 15 mL (15 mLs Oral Given 4/2/25 1245)   iohexoL (OMNIPAQUE 350) injection 75 mL (75 mLs Intravenous Given 4/2/25 1452)   haloperidol lactate injection 2.5 mg (2.5 mg Intravenous Given 4/2/25 1507)   sodium chloride 0.9% bolus 1,000 mL 1,000 mL (0 mLs Intravenous Stopped 4/2/25 1633)   lactated ringers infusion (1,000 mLs Intravenous New Bag 4/2/25 1707)     Medical Decision Making  53 y/o female which presents to the ED with epigastric abdominal pain that radiates to her back and is similar to previous pancreatitis episodes she has had. Toradol, haldol, and dilaudid given during her ED stay. Labs are unremarkable. Liver enzymes are elevated at baseline. Pt continues to have N/V and she will be admitted to EDOU for fluids, antiemetics, and lab evaluation in the morning. Pt updated with plan and agrees to admission. Kell Vasquez NP consulted and agrees to accept the patient to the EDOU.     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      Differential Diagnosis: gastroenteritis, gastritis, ulcer, cholecystitis, gallstones, pancreatitis, ileus, small bowel obstruction,  appendicitis, constipation    Problems Addressed:  Nausea and vomiting, unspecified vomiting type: acute illness or injury  Other chronic pancreatitis: acute illness or injury  Pancreatic cyst: acute illness or injury    Amount and/or Complexity of Data Reviewed  Labs: ordered. Decision-making details documented in ED Course.  Radiology: ordered.    Risk  OTC drugs.  Prescription drug management.            Scribe Attestation:   Scribe #1: I performed the above scribed service and the documentation accurately describes the services I performed. I attest to the accuracy of the note.        ED Course as of 25 BP(!): 143/68 [AT]   0943 Temp: 98.5 °F (36.9 °C) [AT]   0943 Temp Source: Oral [AT]   0943 Pulse: 63 [AT]   0943 SpO2: 95 % [AT]   0943 Resp(!): 22 [AT]   1206 Lipase: 56 [AT]   1246 Hepatitis C Ab: Negative [AT]   1246 HIV 1/2 Ag/Ab: Negative [AT]   1414 Bacteria, UA(!): Few [AT]   1414 Hyaline Casts, UA(!): 40 [AT]   1414 Glucose(!): 158 [AT]   1414 ALP(!): 304 [AT]   1414 AST(!): 103 [AT]   1414 ALT(!): 61 [AT]      ED Course User Index  [AT] Ayesha Cr FNP                       Provider Attestation for Scribe: IAyesha FNP, reviewed documentation as scribed in my presence, which is both accurate and complete.      Clinical Impression:  Final diagnoses:  [K86.1] Other chronic pancreatitis (Primary)  [K86.2] Pancreatic cyst  [R11.2] Nausea and vomiting, unspecified vomiting type          ED Disposition Condition    Observation Stable                  [1]   Social History  Tobacco Use    Smoking status: Former     Current packs/day: 0.00     Types: Cigarettes     Quit date: 2019     Years since quittin.2    Smokeless tobacco: Never    Tobacco comments:     Quit 2019   Substance Use Topics    Alcohol use: Yes     Alcohol/week: 2.0 standard drinks of alcohol     Types: 2 Glasses of wine per week     Comment: social     Drug use: No        Tayo  Ayesha BROWN, [facility]  04/02/25 2050

## 2025-04-02 NOTE — Clinical Note
Diagnosis: Acute pancreatitis [577.0.ICD-9-CM]   Future Attending Provider: JOSE CRUZ CUELLO [[id number]]   Is the patient being sent to ED Observation?: Yes

## 2025-04-03 VITALS
HEART RATE: 63 BPM | WEIGHT: 147 LBS | OXYGEN SATURATION: 94 % | TEMPERATURE: 98 F | BODY MASS INDEX: 27.05 KG/M2 | SYSTOLIC BLOOD PRESSURE: 142 MMHG | DIASTOLIC BLOOD PRESSURE: 69 MMHG | HEIGHT: 62 IN | RESPIRATION RATE: 18 BRPM

## 2025-04-03 PROBLEM — K86.1 ACUTE ON CHRONIC PANCREATITIS: Status: ACTIVE | Noted: 2018-07-25

## 2025-04-03 LAB
ABSOLUTE EOSINOPHIL (OHS): 0.01 K/UL
ABSOLUTE MONOCYTE (OHS): 0.48 K/UL (ref 0.3–1)
ABSOLUTE NEUTROPHIL COUNT (OHS): 2.9 K/UL (ref 1.8–7.7)
ALBUMIN SERPL BCP-MCNC: 3.7 G/DL (ref 3.5–5.2)
ALP SERPL-CCNC: 278 UNIT/L (ref 40–150)
ALT SERPL W/O P-5'-P-CCNC: 51 UNIT/L (ref 10–44)
ANION GAP (OHS): 10 MMOL/L (ref 8–16)
AST SERPL-CCNC: 104 UNIT/L (ref 11–45)
BASOPHILS # BLD AUTO: 0.02 K/UL
BASOPHILS NFR BLD AUTO: 0.4 %
BILIRUB SERPL-MCNC: 1 MG/DL (ref 0.1–1)
BUN SERPL-MCNC: 4 MG/DL (ref 6–20)
CALCIUM SERPL-MCNC: 9.8 MG/DL (ref 8.7–10.5)
CHLORIDE SERPL-SCNC: 100 MMOL/L (ref 95–110)
CO2 SERPL-SCNC: 26 MMOL/L (ref 23–29)
CREAT SERPL-MCNC: 0.7 MG/DL (ref 0.5–1.4)
ERYTHROCYTE [DISTWIDTH] IN BLOOD BY AUTOMATED COUNT: 12.4 % (ref 11.5–14.5)
GFR SERPLBLD CREATININE-BSD FMLA CKD-EPI: >60 ML/MIN/1.73/M2
GLUCOSE SERPL-MCNC: 145 MG/DL (ref 70–110)
HCT VFR BLD AUTO: 35 % (ref 37–48.5)
HGB BLD-MCNC: 12.5 GM/DL (ref 12–16)
IMM GRANULOCYTES # BLD AUTO: 0.02 K/UL (ref 0–0.04)
IMM GRANULOCYTES NFR BLD AUTO: 0.4 % (ref 0–0.5)
LYMPHOCYTES # BLD AUTO: 1.29 K/UL (ref 1–4.8)
MCH RBC QN AUTO: 35.5 PG (ref 27–31)
MCHC RBC AUTO-ENTMCNC: 35.7 G/DL (ref 32–36)
MCV RBC AUTO: 99 FL (ref 82–98)
NUCLEATED RBC (/100WBC) (OHS): 0 /100 WBC
PLATELET # BLD AUTO: 189 K/UL (ref 150–450)
PMV BLD AUTO: 9.9 FL (ref 9.2–12.9)
POCT GLUCOSE: 135 MG/DL (ref 70–110)
POCT GLUCOSE: 141 MG/DL (ref 70–110)
POTASSIUM SERPL-SCNC: 4.6 MMOL/L (ref 3.5–5.1)
PROT SERPL-MCNC: 7.4 GM/DL (ref 6–8.4)
RBC # BLD AUTO: 3.52 M/UL (ref 4–5.4)
RELATIVE EOSINOPHIL (OHS): 0.2 %
RELATIVE LYMPHOCYTE (OHS): 27.3 % (ref 18–48)
RELATIVE MONOCYTE (OHS): 10.2 % (ref 4–15)
RELATIVE NEUTROPHIL (OHS): 61.5 % (ref 38–73)
SODIUM SERPL-SCNC: 136 MMOL/L (ref 136–145)
WBC # BLD AUTO: 4.72 K/UL (ref 3.9–12.7)

## 2025-04-03 PROCEDURE — 82040 ASSAY OF SERUM ALBUMIN: CPT | Performed by: NURSE PRACTITIONER

## 2025-04-03 PROCEDURE — 94761 N-INVAS EAR/PLS OXIMETRY MLT: CPT

## 2025-04-03 PROCEDURE — 96361 HYDRATE IV INFUSION ADD-ON: CPT

## 2025-04-03 PROCEDURE — 82962 GLUCOSE BLOOD TEST: CPT | Mod: 91

## 2025-04-03 PROCEDURE — G0378 HOSPITAL OBSERVATION PER HR: HCPCS

## 2025-04-03 PROCEDURE — 36415 COLL VENOUS BLD VENIPUNCTURE: CPT | Performed by: NURSE PRACTITIONER

## 2025-04-03 PROCEDURE — 25000003 PHARM REV CODE 250: Performed by: NURSE PRACTITIONER

## 2025-04-03 PROCEDURE — 96376 TX/PRO/DX INJ SAME DRUG ADON: CPT

## 2025-04-03 PROCEDURE — 85025 COMPLETE CBC W/AUTO DIFF WBC: CPT | Performed by: NURSE PRACTITIONER

## 2025-04-03 PROCEDURE — 63600175 PHARM REV CODE 636 W HCPCS: Performed by: NURSE PRACTITIONER

## 2025-04-03 RX ORDER — ONDANSETRON 4 MG/1
4 TABLET, ORALLY DISINTEGRATING ORAL EVERY 6 HOURS PRN
Qty: 20 TABLET | Refills: 0 | Status: SHIPPED | OUTPATIENT
Start: 2025-04-03 | End: 2025-04-08

## 2025-04-03 RX ORDER — SCOPOLAMINE 1 MG/3D
1 PATCH, EXTENDED RELEASE TRANSDERMAL
Qty: 2 PATCH | Refills: 0 | Status: SHIPPED | OUTPATIENT
Start: 2025-04-03 | End: 2025-04-09

## 2025-04-03 RX ORDER — METOCLOPRAMIDE 10 MG/1
10 TABLET ORAL EVERY 6 HOURS
Qty: 20 TABLET | Refills: 0 | Status: SHIPPED | OUTPATIENT
Start: 2025-04-03 | End: 2025-04-08

## 2025-04-03 RX ORDER — HYDROCODONE BITARTRATE AND ACETAMINOPHEN 5; 325 MG/1; MG/1
1 TABLET ORAL EVERY 6 HOURS PRN
Qty: 12 TABLET | Refills: 0 | Status: SHIPPED | OUTPATIENT
Start: 2025-04-03

## 2025-04-03 RX ORDER — IBUPROFEN 800 MG/1
800 TABLET ORAL EVERY 6 HOURS PRN
Qty: 16 TABLET | Refills: 0 | Status: SHIPPED | OUTPATIENT
Start: 2025-04-03 | End: 2025-04-07

## 2025-04-03 RX ADMIN — AMLODIPINE BESYLATE 10 MG: 5 TABLET ORAL at 08:04

## 2025-04-03 RX ADMIN — MORPHINE SULFATE 4 MG: 4 INJECTION INTRAVENOUS at 08:04

## 2025-04-03 RX ADMIN — MORPHINE SULFATE 4 MG: 4 INJECTION INTRAVENOUS at 03:04

## 2025-04-03 RX ADMIN — SODIUM CHLORIDE, POTASSIUM CHLORIDE, SODIUM LACTATE AND CALCIUM CHLORIDE 1000 ML: 600; 310; 30; 20 INJECTION, SOLUTION INTRAVENOUS at 12:04

## 2025-04-03 RX ADMIN — ATORVASTATIN CALCIUM 20 MG: 20 TABLET, FILM COATED ORAL at 08:04

## 2025-04-03 RX ADMIN — Medication 400 MG: at 08:04

## 2025-04-03 RX ADMIN — PANTOPRAZOLE SODIUM 40 MG: 40 TABLET, DELAYED RELEASE ORAL at 08:04

## 2025-04-03 NOTE — DISCHARGE INSTRUCTIONS
Keep a clear liquid diet for the next 4 days. (Bone broths, light soups, mashed potatoes, plenty of water or fluids containing electrolytes) Take ibuprofen 800 mg every 6 hours as needed for mild to moderate pain. Reserve Norco for severe pain only. Take Zofran as needed for nausea. If Zofran does not provide relief, try Reglan or scopolamine patch. Follow up with GI. Referral has been provided. Avoid alcohol and high fat diet for a while.

## 2025-04-03 NOTE — ED NOTES
Report given by ASHLEY Anderson, Assumed care of this patient. Received patient A&Ox4. Denies any further needs and complaints this time. Safety measures in place; side rails up x 3. Call light within pt reach. Plan of care ongoing.

## 2025-04-03 NOTE — PROGRESS NOTES
ED Observation Unit  Progress Note      HPI   54 y.o. female with a PMHx of GERD, HTN, HLD, DM, pancreatitis, and PSHx of a cholecystectomy presents to the ED for evaluation of sharp abdominal pain that radiates to the back x3 days. Pt reports associated vomiting that began yesterday, nausea, and loss of appetite. Pt denies any fever. She states that her current pain feels similar to a previous pancreatitis flare. Pt has not attempted any treatment for her symptoms. There are no other complaints at this time. Pt is allergic to HCTZ.    I reviewed the ED Provider Note dated 04/02/2025  prior to my evaluation of this patient.  I reviewed all labs and imaging performed in the Main ED, prior to patient being placed in Observation. Patient was placed in the ED Observation Unit for acute on chronic pancreatitis.    Interval History   04/03/25  On review of labs, no leukocytosis or left shift.  Hemoglobin within normal limits.  Normal platelet count.  On CMP, no electrolyte derangement.  Normal renal function.  Alk-phos is still elevated at 278 but down trending.  There is still transaminitis; however, values are down trending.  Normal anion gap.  On my assessment, patient reports her pain is controlled with morphine.  Currently denies any nausea.  Has been able to tolerate clear liquid diet.  Currently declines need for antiemetics or analgesics at this time.  States that she does not have a GI provider.  Will provide a new referral.  Likely discharge later today.      04/02/25  Labs are unremarkable. Liver enzymes are elevated at baseline.  CT reveals acute on chronic pancreatitis with slight increase in cyst at the head of the pancreas.  She also has a chronic appearing splenic vein thrombosis.  Pt continues to have N/V and she will be admitted to EDOU for fluids, antiemetics, and lab evaluation in the morning.     PMHx   Past Medical History:   Diagnosis Date    GERD (gastroesophageal reflux disease)     Hyperlipidemia  associated with type 2 diabetes mellitus     Hypertension     Mental disorder     Pancreatitis     Type 2 diabetes mellitus     Vitamin D deficiency       Past Surgical History:   Procedure Laterality Date     SECTION      COLONOSCOPY N/A 3/18/2021    Procedure: COLONOSCOPY;  Surgeon: Celestino Walker MD;  Location: [facility] (4TH FLR);  Service: Endoscopy;  Laterality: N/A;  covid screening PCW 3/15 - christen    ENDOMETRIAL ABLATION      ENDOSCOPIC ULTRASOUND OF UPPER GASTROINTESTINAL TRACT N/A 2019    Procedure: ULTRASOUND, UPPER GI TRACT, ENDOSCOPIC;  Surgeon: Kristian Wakefield MD;  Location: [facility] ENDO (2ND FLR);  Service: Endoscopy;  Laterality: N/A;  To be done end of  along with EGD evaluation to r/o PUD.    ENDOSCOPIC ULTRASOUND OF UPPER GASTROINTESTINAL TRACT N/A 2019    Procedure: ULTRASOUND, UPPER GI TRACT, ENDOSCOPIC;  Surgeon: Po Noonan MD;  Location: [facility] ENDO (2ND FLR);  Service: Endoscopy;  Laterality: N/A;  CT still shows a spot in the pancreas. It has not changed, and likely just represents an area of inflammation, but I think we should take another look with EUS.  Dr SHERLEY Wakefield  PM prep - pg  10/31/19 appt confirmed-rb    ENDOSCOPIC ULTRASOUND OF UPPER GASTROINTESTINAL TRACT N/A 10/15/2021    Procedure: ULTRASOUND, UPPER GI TRACT, ENDOSCOPIC;  Surgeon: Rick Mooney MD;  Location: [facility] (2ND FLR);  Service: Endoscopy;  Laterality: N/A;  Covid-19 test 10/12 [facility]-instructions sent via portal 10/5-MG    ESOPHAGOGASTRODUODENOSCOPY N/A 2019    Procedure: EGD (ESOPHAGOGASTRODUODENOSCOPY);  Surgeon: Kristian Wakefield MD;  Location: [facility] (2ND FLR);  Service: Endoscopy;  Laterality: N/A;    LAPAROSCOPIC CHOLECYSTECTOMY N/A 2019    Procedure: CHOLECYSTECTOMY, LAPAROSCOPIC;  Surgeon: Huber Neri MD;  Location: [facility] OR 2ND FLR;  Service: General;  Laterality: N/A;    TUBAL LIGATION  1994    upper gi          Family Hx   Family History   Problem Relation Name  Age of Onset    Heart failure Mother      Stroke Mother      Aneurysm Mother      Alcohol abuse Mother      Hypertension Father      Alcohol abuse Father      Liver disease Father      Diabetes Maternal Aunt      Stomach cancer Maternal Aunt      Diabetes Maternal Uncle      Heart disease Maternal Uncle      Lung cancer Maternal Uncle      Coronary artery disease Paternal Grandmother      Dementia Paternal Grandmother      Dementia Paternal Grandfather      Colon cancer Neg Hx      Esophageal cancer Neg Hx      Ovarian cancer Neg Hx      Breast cancer Neg Hx      Amblyopia Neg Hx      Blindness Neg Hx      Cancer Neg Hx      Cataracts Neg Hx      Glaucoma Neg Hx      Macular degeneration Neg Hx      Retinal detachment Neg Hx      Strabismus Neg Hx      Thyroid disease Neg Hx          Social Hx   Social History     Socioeconomic History    Marital status:    Occupational History    Occupation: manager   Tobacco Use    Smoking status: Former     Current packs/day: 0.00     Types: Cigarettes     Quit date: 2019     Years since quittin.2    Smokeless tobacco: Never    Tobacco comments:     Quit    Substance and Sexual Activity    Alcohol use: Yes     Alcohol/week: 2.0 standard drinks of alcohol     Types: 2 Glasses of wine per week     Comment: social     Drug use: No    Sexual activity: Yes     Partners: Male     Birth control/protection: See Surgical Hx     Comment: tubal ligation in    Social History Narrative    , 3 adult children.  of an apart[employer]vivo complex.      Social Drivers of Health     Financial Resource Strain: Medium Risk (2024)    Overall Financial Resource Strain (CARDIA)     Difficulty of Paying Living Expenses: Somewhat hard   Food Insecurity: Food Insecurity Present (2024)    Hunger Vital Sign     Worried About Running Out of Food in the Last Year: Sometimes true     Ran Out of Food in the Last Year: Sometimes true   Transportation Needs:  No Transportation Needs (2/29/2024)    PRAPARE - Transportation     Lack of Transportation (Medical): No     Lack of Transportation (Non-Medical): No   Physical Activity: Unknown (2/29/2024)    Exercise Vital Sign     Days of Exercise per Week: 2 days   Recent Concern: Physical Activity - Insufficiently Active (2/29/2024)    Exercise Vital Sign     Days of Exercise per Week: 2 days     Minutes of Exercise per Session: 20 min   Stress: Stress Concern Present (2/29/2024)    Citizen of [place] of Occupational Health - Occupational Stress Questionnaire     Feeling of Stress : Rather much   Housing Stability: Low Risk  (2/29/2024)    Housing Stability Vital Sign     Unable to Pay for Housing in the Last Year: No     Number of Places Lived in the Last Year: 1     Unstable Housing in the Last Year: No        Vital Signs   Vitals:    04/03/25 0336 04/03/25 0840 04/03/25 0842 04/03/25 0859   BP:   (!) 144/74    BP Location:   Right arm    Patient Position:       Pulse:   61    Resp: 16  16 18   Temp:   98 °F (36.7 °C)    TempSrc:   Oral    SpO2:  98% 95%    Weight:       Height:            Review of Systems  ROS  As per HPI.     Brief Physical Exam/Reassessment   Physical Exam  Constitutional:       General: She is not in acute distress.     Appearance: Normal appearance. She is well-developed. She is not ill-appearing, toxic-appearing or diaphoretic.   HENT:      Head: Normocephalic and atraumatic.      Right Ear: External ear normal.      Left Ear: External ear normal.      Nose: Nose normal.      Mouth/Throat:      Mouth: Mucous membranes are moist.      Pharynx: Oropharynx is clear.   Eyes:      Extraocular Movements: Extraocular movements intact.      Conjunctiva/sclera: Conjunctivae normal.   Cardiovascular:      Rate and Rhythm: Normal rate and regular rhythm.   Pulmonary:      Effort: Pulmonary effort is normal.      Breath sounds: Normal breath sounds.   Abdominal:      General: Bowel sounds are normal. There is no  distension.      Palpations: Abdomen is soft. There is no mass.      Tenderness: There is no abdominal tenderness. There is no right CVA tenderness, left CVA tenderness, guarding or rebound.      Hernia: No hernia is present.   Musculoskeletal:         General: No swelling. Normal range of motion.      Right lower leg: No edema.      Left lower leg: No edema.   Skin:     General: Skin is warm and dry.      Coloration: Skin is not pale.      Findings: No rash.   Neurological:      General: No focal deficit present.      Mental Status: She is alert and oriented to person, place, and time.   Psychiatric:         Mood and Affect: Mood normal. Mood is not anxious.         Behavior: Behavior normal.         Thought Content: Thought content normal.         Judgment: Judgment normal.         Labs/Imaging   Labs Reviewed   COMPREHENSIVE METABOLIC PANEL - Abnormal       Result Value    Sodium 138      Potassium 3.9      Chloride 100      CO2 24      Glucose 158 (*)     BUN 9      Creatinine 0.8      Calcium 9.9      Protein Total 7.7      Albumin 4.0      Bilirubin Total 0.9       (*)      (*)     ALT 61 (*)     Anion Gap 14      eGFR >60     URINALYSIS, REFLEX TO URINE CULTURE - Abnormal    Color, UA Yellow      Appearance, UA Clear      pH, UA 6.0      Spec Grav UA >=1.030 (*)     Protein, UA 1+ (*)     Glucose, UA Trace (*)     Ketones, UA 2+ (*)     Bilirubin, UA 1+ (*)     Blood, UA Negative      Nitrites, UA Negative      Urobilinogen, UA >=8.0 (*)     Leukocyte Esterase, UA Negative     CBC WITH DIFFERENTIAL - Abnormal    WBC 3.79 (*)     RBC 3.88 (*)     HGB 13.6      HCT 38.7       (*)     MCH 35.1 (*)     MCHC 35.1      RDW 12.6      Platelet Count 213      MPV 10.8      Nucleated RBC 0      Neut % 72.8      Lymph % 17.4 (*)     Mono % 8.2      Eos % 0.0      Basophil % 1.1      Imm Grans % 0.5      Neut # 2.76      Lymph # 0.66 (*)     Mono # 0.31      Eos # 0.00      Baso # 0.04      Imm Grans  # 0.02     URINALYSIS MICROSCOPIC - Abnormal    RBC, UA 0      WBC, UA 1      Bacteria, UA Few (*)     Yeast, UA None      Squamous Epithelial Cells, UA 6      Hyaline Casts, UA 40 (*)     Calcium Oxalate Crystals, UA Few      Microscopic Comment       COMPREHENSIVE METABOLIC PANEL - Abnormal    Sodium 136      Potassium 4.6      Chloride 100      CO2 26      Glucose 145 (*)     BUN 4 (*)     Creatinine 0.7      Calcium 9.8      Protein Total 7.4      Albumin 3.7      Bilirubin Total 1.0       (*)      (*)     ALT 51 (*)     Anion Gap 10      eGFR >60     CBC WITH DIFFERENTIAL - Abnormal    WBC 4.72      RBC 3.52 (*)     HGB 12.5      HCT 35.0 (*)     MCV 99 (*)     MCH 35.5 (*)     MCHC 35.7      RDW 12.4      Platelet Count 189      MPV 9.9      Nucleated RBC 0      Neut % 61.5      Lymph % 27.3      Mono % 10.2      Eos % 0.2      Basophil % 0.4      Imm Grans % 0.4      Neut # 2.90      Lymph # 1.29      Mono # 0.48      Eos # 0.01      Baso # 0.02      Imm Grans # 0.02     POCT GLUCOSE - Abnormal    POCT Glucose 115 (*)    POCT GLUCOSE - Abnormal    POCT Glucose 141 (*)    HEPATITIS C ANTIBODY - Normal    Hep C Ab Interp Negative     HIV 1 / 2 ANTIBODY - Normal    HIV 1/2 Ag/Ab Negative     LIPASE - Normal    Lipase Level 56     CBC W/ AUTO DIFFERENTIAL    Narrative:     The following orders were created for panel order CBC W/ AUTO DIFFERENTIAL.  Procedure                               Abnormality         Status                     ---------                               -----------         ------                     CBC with Differential[[id number]]       Abnormal            Final result                 Please view results for these tests on the individual orders.   CBC W/ AUTO DIFFERENTIAL    Narrative:     The following orders were created for panel order CBC auto differential.  Procedure                               Abnormality         Status                     ---------                                -----------         ------                     CBC with Differential[[id number]]       Abnormal            Final result                 Please view results for these tests on the individual orders.   POCT GLUCOSE MONITORING CONTINUOUS      Imaging Results              CT Abdomen Pelvis With IV Contrast NO Oral Contrast (Final result)  Result time 04/02/25 15:19:57      Final result by Mikala Tsai MD (04/02/25 15:19:57)                   Impression:      Findings suspicious for acute on chronic pancreatitis.  Interval development of a low-density focus at the pancreatic head, 2.1 cm, for which considerations would include pancreatic pseudocyst or cyst.  Recommend correlation with CT or MRI pancreas protocol in 6-12 weeks.    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    Hepatic steatosis.      Electronically signed by: Mikala Tsai  Date:    04/02/2025  Time:    15:19               Narrative:    EXAMINATION:  CT ABDOMEN PELVIS WITH IV CONTRAST    CLINICAL HISTORY:  Epigastric pain;    TECHNIQUE:  Low dose axial images, sagittal and coronal reformations were obtained from the lung bases to the pubic symphysis following the IV administration of 75 mL of Omnipaque 350.    COMPARISON:  10/05/2022.    FINDINGS:  The lung bases are clear.    The liver parenchyma is of diffuse decreased attenuation.  Gallbladder is surgically absent.    Normal size spleen.    Mild haziness along the pancreatic head.  Multiple calcifications along the pancreatic parenchyma which can be seen with chronic pancreatitis.  Newly developed hypodensity in the pancreatic head estimated to measure 2.1 cm.  Splenic vein is likely thrombosed with several collateral vessels in the left upper quadrant.    Adrenal glands are normal.  Kidneys concentrate contrast appropriately.  The urinary bladder is unremarkable.    Suspected subserosal uterine leiomyomas.    Aorta is normal  caliber.  No retroperitoneal or mesenteric lymph node enlargement seen.    Stomach and loops of bowel are normal caliber.  Visualized appendix is normal.  No significant free fluid in the pelvis.    Regional skeleton shows degenerative change.                                       I reviewed all labs, imaging, EKGs.     Plan   1. Other chronic pancreatitis    2. Pancreatic cyst    3. Nausea and vomiting, unspecified vomiting type      IV fluids, pain control, antiemetics PRN, clear liquid diet initially but advancing to bland diet now.  Follow up with GI for repeat imaging in 6-12 weeks  Antiemetics PRN. [place] diet.

## 2025-04-03 NOTE — DISCHARGE SUMMARY
[facility] ED Observation Unit  Discharge Summary        History of Present Illness:    54 y.o. female with a PMHx of GERD, HTN, HLD, DM, pancreatitis, and PSHx of a cholecystectomy presents to the ED for evaluation of sharp abdominal pain that radiates to the back x3 days. Pt reports associated vomiting that began yesterday, nausea, and loss of appetite. Pt denies any fever. She states that her current pain feels similar to a previous pancreatitis flare. Pt has not attempted any treatment for her symptoms. There are no other complaints at this time. Pt is allergic to HCTZ.     I reviewed the ED Provider Note dated 04/02/2025  prior to my evaluation of this patient.  I reviewed all labs and imaging performed in the Main ED, prior to patient being placed in Observation. Patient was placed in the ED Observation Unit for acute on chronic pancreatitis.     Observation Course:    04/03/25 1:00 PM  Pt tolerated her PO challenge. Resting comfortably. Discussed dietary modifications for the next few days, which include clear liquid diet primarily and occasional bland soft foods. Advised her to stay hydrated with water or fluids containing electrolytes. Will prescribe analgesics and antiemetics. Will provide referral to GI. Pt counseled on avoiding alcohol.       04/03/25  On review of labs, no leukocytosis or left shift.  Hemoglobin within normal limits.  Normal platelet count.  On CMP, no electrolyte derangement.  Normal renal function.  Alk-phos is still elevated at 278 but down trending.  There is still transaminitis; however, values are down trending.  Normal anion gap.  On my assessment, patient reports her pain is controlled with morphine.  Currently denies any nausea.  Has been able to tolerate clear liquid diet.  Currently declines need for antiemetics or analgesics at this time.  States that she does not have a GI provider.  Will provide a new referral.  Likely discharge later today.        04/02/25  Labs are  unremarkable. Liver enzymes are elevated at baseline.  CT reveals acute on chronic pancreatitis with slight increase in cyst at the head of the pancreas.  She also has a chronic appearing splenic vein thrombosis.  Pt continues to have N/V and she will be admitted to EDOU for fluids, antiemetics, and lab evaluation in the morning.    Brief Physical Exam/Reassessment   ROS  As per HPI.     Physical Exam    Nursing note and vitals reviewed.  Constitutional: She appears well-developed and well-nourished. No distress.   HENT:   Head: Normocephalic and atraumatic.   Nose: Nose normal. Mouth/Throat: Oropharynx is clear and moist.   Eyes: Conjunctivae and EOM are normal.   Neck: Neck supple.   Normal range of motion.  Cardiovascular:  Normal rate, regular rhythm, normal heart sounds and intact distal pulses.           Pulmonary/Chest: Breath sounds normal. No respiratory distress. She has no wheezes. She has no rhonchi. She has no rales.   Abdominal: Abdomen is soft. Bowel sounds are normal. She exhibits no distension and no mass. There is no abdominal tenderness.   No focal abdominal or pelvic ttp.  There is no rebound and no guarding.   Musculoskeletal:         General: Normal range of motion.      Cervical back: Normal range of motion and neck supple.     Neurological: She is alert and oriented to person, place, and time. She has normal strength.   Skin: Skin is warm and dry.   Psychiatric: She has a normal mood and affect. Her behavior is normal. Judgment and thought content normal.         ED/OBS Workup:  Vitals:    04/03/25 0336 04/03/25 0840 04/03/25 0842 04/03/25 0859   BP:   (!) 144/74    Pulse:   61    Resp: 16  16 18   Temp:   98 °F (36.7 °C)    TempSrc:   Oral    SpO2:  98% 95%    Weight:       Height:        04/03/25 1235   BP: (!) 142/69   Pulse: 63   Resp: 18   Temp: 98 °F (36.7 °C)   TempSrc: Oral   SpO2: (!) 94%   Weight:    Height:        Labs Reviewed   COMPREHENSIVE METABOLIC PANEL - Abnormal        Result Value    Sodium 138      Potassium 3.9      Chloride 100      CO2 24      Glucose 158 (*)     BUN 9      Creatinine 0.8      Calcium 9.9      Protein Total 7.7      Albumin 4.0      Bilirubin Total 0.9       (*)      (*)     ALT 61 (*)     Anion Gap 14      eGFR >60     URINALYSIS, REFLEX TO URINE CULTURE - Abnormal    Color, UA Yellow      Appearance, UA Clear      pH, UA 6.0      Spec Grav UA >=1.030 (*)     Protein, UA 1+ (*)     Glucose, UA Trace (*)     Ketones, UA 2+ (*)     Bilirubin, UA 1+ (*)     Blood, UA Negative      Nitrites, UA Negative      Urobilinogen, UA >=8.0 (*)     Leukocyte Esterase, UA Negative     CBC WITH DIFFERENTIAL - Abnormal    WBC 3.79 (*)     RBC 3.88 (*)     HGB 13.6      HCT 38.7       (*)     MCH 35.1 (*)     MCHC 35.1      RDW 12.6      Platelet Count 213      MPV 10.8      Nucleated RBC 0      Neut % 72.8      Lymph % 17.4 (*)     Mono % 8.2      Eos % 0.0      Basophil % 1.1      Imm Grans % 0.5      Neut # 2.76      Lymph # 0.66 (*)     Mono # 0.31      Eos # 0.00      Baso # 0.04      Imm Grans # 0.02     URINALYSIS MICROSCOPIC - Abnormal    RBC, UA 0      WBC, UA 1      Bacteria, UA Few (*)     Yeast, UA None      Squamous Epithelial Cells, UA 6      Hyaline Casts, UA 40 (*)     Calcium Oxalate Crystals, UA Few      Microscopic Comment       COMPREHENSIVE METABOLIC PANEL - Abnormal    Sodium 136      Potassium 4.6      Chloride 100      CO2 26      Glucose 145 (*)     BUN 4 (*)     Creatinine 0.7      Calcium 9.8      Protein Total 7.4      Albumin 3.7      Bilirubin Total 1.0       (*)      (*)     ALT 51 (*)     Anion Gap 10      eGFR >60     CBC WITH DIFFERENTIAL - Abnormal    WBC 4.72      RBC 3.52 (*)     HGB 12.5      HCT 35.0 (*)     MCV 99 (*)     MCH 35.5 (*)     MCHC 35.7      RDW 12.4      Platelet Count 189      MPV 9.9      Nucleated RBC 0      Neut % 61.5      Lymph % 27.3      Mono % 10.2      Eos % 0.2      Basophil %  0.4      Imm Grans % 0.4      Neut # 2.90      Lymph # 1.29      Mono # 0.48      Eos # 0.01      Baso # 0.02      Imm Grans # 0.02     POCT GLUCOSE - Abnormal    POCT Glucose 115 (*)    POCT GLUCOSE - Abnormal    POCT Glucose 141 (*)    POCT GLUCOSE - Abnormal    POCT Glucose 135 (*)    HEPATITIS C ANTIBODY - Normal    Hep C Ab Interp Negative     HIV 1 / 2 ANTIBODY - Normal    HIV 1/2 Ag/Ab Negative     LIPASE - Normal    Lipase Level 56     CBC W/ AUTO DIFFERENTIAL    Narrative:     The following orders were created for panel order CBC W/ AUTO DIFFERENTIAL.  Procedure                               Abnormality         Status                     ---------                               -----------         ------                     CBC with Differential[[id number]]       Abnormal            Final result                 Please view results for these tests on the individual orders.   CBC W/ AUTO DIFFERENTIAL    Narrative:     The following orders were created for panel order CBC auto differential.  Procedure                               Abnormality         Status                     ---------                               -----------         ------                     CBC with Differential[[id number]]       Abnormal            Final result                 Please view results for these tests on the individual orders.   POCT GLUCOSE MONITORING CONTINUOUS       CT Abdomen Pelvis With IV Contrast NO Oral Contrast   Final Result      Findings suspicious for acute on chronic pancreatitis.  Interval development of a low-density focus at the pancreatic head, 2.1 cm, for which considerations would include pancreatic pseudocyst or cyst.  Recommend correlation with CT or MRI pancreas protocol in 6-12 weeks.      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      Hepatic steatosis.         Electronically signed by: Mikala Tsai   Date:    04/02/2025   Time:    15:19           Final Diagnosis:  1. Other chronic pancreatitis    2. Pancreatic cyst    3. Nausea and vomiting, unspecified vomiting type        Plan:  Clear liquid diet at home for a few days. Occasional bland soft diet with increased hydration. Analgesics PRN. Antiemetics PRN. Follow up with GI.   Follow up with GI for repeat imaging.  Antiemetics PRN.     Discharge Condition: Good    Disposition: Home or Self Care     Time spent on the discharge of the patient including review of hospital course with the patient. reviewing discharge medications and arranging follow-up care 35 minutes.  Patient was seen and examined on the date of discharge and determined to be suitable for discharge.    Follow Up:   ED Disposition Condition    Discharge Stable            ED Prescriptions       Medication Sig Dispense Start Date End Date Auth. Provider    ibuprofen (ADVIL,MOTRIN) 800 MG tablet Take 1 tablet (800 mg total) by mouth every 6 (six) hours as needed for Pain. 16 tablet 4/3/2025 4/7/2025 Reji Pizarro PA-C    HYDROcodone-acetaminophen (NORCO) 5-325 mg per tablet Take 1 tablet by mouth every 6 (six) hours as needed for Pain. 12 tablet 4/3/2025 -- Reji Pizarro PA-C    ondansetron (ZOFRAN-ODT) 4 MG TbDL Take 1 tablet (4 mg total) by mouth every 6 (six) hours as needed (nausea). 20 tablet 4/3/2025 4/8/2025 Reji Pizarro PA-C    metoclopramide HCl (REGLAN) 10 MG tablet Take 1 tablet (10 mg total) by mouth every 6 (six) hours. for 5 days 20 tablet 4/3/2025 4/8/2025 Reji Pizarro PA-C    scopolamine (TRANSDERM-SCOP) 1.3-1.5 mg (1 mg over 3 days) Place 1 patch onto the skin every 72 hours. for 6 days 2 patch 4/3/2025 4/9/2025 Reji Pizarro PA-C          Follow-up Information    None         Future Appointments   Date Time Provider Department Center   7/29/2025  9:00 AM Melissa Neely MD [facility]

## 2025-04-04 ENCOUNTER — PATIENT OUTREACH (OUTPATIENT)
Dept: ADMINISTRATIVE | Facility: CLINIC | Age: 55
End: 2025-04-04
Payer: COMMERCIAL

## 2025-05-02 ENCOUNTER — HOSPITAL ENCOUNTER (OUTPATIENT)
Dept: RADIOLOGY | Facility: HOSPITAL | Age: 55
Discharge: HOME | End: 2025-05-02
Attending: INTERNAL MEDICINE
Payer: COMMERCIAL

## 2025-05-02 DIAGNOSIS — Z12.31 ENCOUNTER FOR SCREENING MAMMOGRAM FOR BREAST CANCER: ICD-10-CM

## 2025-05-02 PROCEDURE — 77067 SCR MAMMO BI INCL CAD: CPT | Mod: TC

## 2025-05-02 PROCEDURE — 77067 SCR MAMMO BI INCL CAD: CPT | Mod: 26,,, | Performed by: RADIOLOGY

## 2025-05-02 PROCEDURE — 77063 BREAST TOMOSYNTHESIS BI: CPT | Mod: 26,,, | Performed by: RADIOLOGY

## 2025-05-07 ENCOUNTER — RESULTS FOLLOW-UP (OUTPATIENT)
Dept: PRIMARY CARE CLINIC | Facility: CLINIC | Age: 55
End: 2025-05-07

## 2025-05-07 NOTE — PROGRESS NOTES
I sent pt a my chart message -  I reviewed your Mammogram as Dr. Neely is out of office-  There are scattered areas of fibroglandular density. There is no evidence of suspicious masses, microcalcifications or architectural distortion.  Impression:   No mammographic evidence of malignancy.  Recommendation:  Routine screening mammogram in 1 year is recommended.  Dr. SOLORZANO

## 2025-05-15 DIAGNOSIS — E11.9 TYPE 2 DIABETES MELLITUS WITHOUT COMPLICATION, WITHOUT LONG-TERM CURRENT USE OF INSULIN: ICD-10-CM

## 2025-05-15 RX ORDER — LANOLIN ALCOHOL/MO/W.PET/CERES
400 CREAM (GRAM) TOPICAL DAILY
COMMUNITY
Start: 2025-02-05

## 2025-05-15 NOTE — TELEPHONE ENCOUNTER
Pt states that she only takes metformin once or twice a week. Says it was making her stomach upset and causing diarrhea, taking glipizide also. Informed me that her glucose has been stabilized.     Mornings-90's  Lunch-120's       Has not been over 150 per patient. Pt would also like a prescription for lancets and test strips for accucheck guide monitor.

## 2025-05-15 NOTE — TELEPHONE ENCOUNTER
Called pt to gain clarification on meds she was taking, pt did not answer. LVM instructing pt call back.

## 2025-05-15 NOTE — TELEPHONE ENCOUNTER
Care Due:                  Date            Visit Type   Department     Provider  --------------------------------------------------------------------------------                                EP -                              PRIMARY      [facility]   Melissa Madera  Last Visit: 01-      CARE ([facility])   CARE           Degranmanda                              EP -                              PRIMARY      [facility]y  Next Visit: 07-      CARE ([facility])   CARE           Florinda                                                            Last  Test          Frequency    Reason                     Performed    Due Date  --------------------------------------------------------------------------------    HBA1C.......  6 months...  glimepiride, metFORMIN...  01- 07-    Health [facility] Embedded Care Due Messages. Reference number: [id number].   5/15/2025 12:12:04 AM CDT

## 2025-05-15 NOTE — TELEPHONE ENCOUNTER
Metformin refill pended is for 2,000 mg daily.  Last order issued was for 500 mg daily - please clarify how she is taking this AND Glimepiride, and is glucose stable?

## 2025-05-16 RX ORDER — METFORMIN HYDROCHLORIDE 500 MG/1
500 TABLET ORAL
Qty: 90 TABLET | Refills: 1 | Status: SHIPPED | OUTPATIENT
Start: 2025-05-16

## 2025-06-23 ENCOUNTER — OFFICE VISIT (OUTPATIENT)
Dept: GASTROENTEROLOGY | Facility: CLINIC | Age: 55
End: 2025-06-23
Payer: COMMERCIAL

## 2025-06-23 VITALS
WEIGHT: 152.13 LBS | DIASTOLIC BLOOD PRESSURE: 75 MMHG | SYSTOLIC BLOOD PRESSURE: 131 MMHG | HEART RATE: 91 BPM | HEIGHT: 62 IN | BODY MASS INDEX: 27.99 KG/M2

## 2025-06-23 DIAGNOSIS — F10.10 ALCOHOL ABUSE: Primary | ICD-10-CM

## 2025-06-23 DIAGNOSIS — R10.13 EPIGASTRIC ABDOMINAL PAIN: ICD-10-CM

## 2025-06-23 DIAGNOSIS — R19.00 INTRA-ABDOMINAL AND PELVIC SWELLING, MASS AND LUMP, UNSPECIFIED SITE: Primary | ICD-10-CM

## 2025-06-23 DIAGNOSIS — R11.2 NAUSEA AND VOMITING, UNSPECIFIED VOMITING TYPE: ICD-10-CM

## 2025-06-23 DIAGNOSIS — K86.1 OTHER CHRONIC PANCREATITIS: ICD-10-CM

## 2025-06-23 DIAGNOSIS — K86.2 PANCREATIC CYST: ICD-10-CM

## 2025-06-23 DIAGNOSIS — F10.10 ALCOHOL ABUSE: ICD-10-CM

## 2025-06-23 PROCEDURE — 3072F LOW RISK FOR RETINOPATHY: CPT | Mod: CPTII,S$GLB,, | Performed by: INTERNAL MEDICINE

## 2025-06-23 PROCEDURE — 3061F NEG MICROALBUMINURIA REV: CPT | Mod: CPTII,S$GLB,, | Performed by: INTERNAL MEDICINE

## 2025-06-23 PROCEDURE — 3044F HG A1C LEVEL LT 7.0%: CPT | Mod: CPTII,S$GLB,, | Performed by: INTERNAL MEDICINE

## 2025-06-23 PROCEDURE — 3075F SYST BP GE 130 - 139MM HG: CPT | Mod: CPTII,S$GLB,, | Performed by: INTERNAL MEDICINE

## 2025-06-23 PROCEDURE — 3078F DIAST BP <80 MM HG: CPT | Mod: CPTII,S$GLB,, | Performed by: INTERNAL MEDICINE

## 2025-06-23 PROCEDURE — 3008F BODY MASS INDEX DOCD: CPT | Mod: CPTII,S$GLB,, | Performed by: INTERNAL MEDICINE

## 2025-06-23 PROCEDURE — 3066F NEPHROPATHY DOC TX: CPT | Mod: CPTII,S$GLB,, | Performed by: INTERNAL MEDICINE

## 2025-06-23 PROCEDURE — 1159F MED LIST DOCD IN RCRD: CPT | Mod: CPTII,S$GLB,, | Performed by: INTERNAL MEDICINE

## 2025-06-23 PROCEDURE — 4010F ACE/ARB THERAPY RXD/TAKEN: CPT | Mod: CPTII,S$GLB,, | Performed by: INTERNAL MEDICINE

## 2025-06-23 PROCEDURE — 99214 OFFICE O/P EST MOD 30 MIN: CPT | Mod: S$GLB,,, | Performed by: INTERNAL MEDICINE

## 2025-06-23 PROCEDURE — 99999 PR PBB SHADOW E&M-EST. PATIENT-LVL IV: CPT | Mod: PBBFAC,,, | Performed by: INTERNAL MEDICINE

## 2025-06-23 RX ORDER — DICYCLOMINE HYDROCHLORIDE 10 MG/1
10 CAPSULE ORAL 2 TIMES DAILY PRN
Qty: 30 CAPSULE | Refills: 0 | Status: SHIPPED | OUTPATIENT
Start: 2025-06-23 | End: 2025-07-23

## 2025-06-23 NOTE — PROGRESS NOTES
Reason for visit:  Intermittent epigastric pain.     HPI:  Ms. Rizvi is a 54-year-old whose was previously seen by me in . She has acute on chronic pancreatitis.  She was previously seen in May 2019 with retrosternal burning sensation with some heaviness.  This had been a continuous pain and discomfort which had not relented.  She tried Zantac 150 mg as needed, and the pain worsened.  There was no recent changes in medication or any trauma to the chest.  She denied any a recent binge alcohol drinking.  She denied taking any NSAIDs on a regular basis.  Pain seemed to go up into her shoulders and along the right side to her back as well. She had previously undergone a cholecystectomy about a year ago for gallbladder colic (after the second pancreatitis event).  She has been under lot of stress since passing of her  colon cancer. Her colonoscopy from 2021 was unremarkable except for diverticulosis.  She denies any changes in her bowel pattern.  No weight loss fevers or chills. Ct scan in May 2019 revealed a 2.8 x 2.1 x 2.5 cm distal pancreatic mass with low attenuation. CT scan from 2019 revealed a 2.5 cm low-density pancreatic tail lesion, unchanged in comparison to 2019. EUS from 2019 revealed pancreatic parenchymal abnormalities consisting of focal hyperechoci material with posterior shadowing in the pancreatic tail. The lesion was getting smaller.     Went in on the 2021 with abdominal pain and was diagnosed acute pancreatitis. CT scan showed extensive peripancreatic fat stranding and hypo enhancing portion of the pancreatic tail suspicious for pancreatic necrosis. This would be the third time with pancreatitis. No recent change in meds or alcohol use. The liver enzymes were normal. Lipase was elevated.     Her last colonoscopy was 2021 which only revealed diverticulosis coli. Her   of colon cancer.    Was in the ER in 2025 with similar  presentation of abdominal pain. CT revealed findings suspicious for acute on chronic pancreatitis.  Interval development of a low-density focus at the pancreatic head, 2.1 cm, for which considerations would include pancreatic pseudocyst or cyst.  Recommend correlation with CT or MRI pancreas protocol in 6-12 weeks. 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 Labs revealed no elevation of Lipase.     Has alcohol drinks (3-4 glasses of wine) every Friday. Pain has improved and has intermittent nausea. Uses Zofran prn. Has been using Ibuprofen prn for pain. Diet is normal with normal BMs. Discussed alcohol use and patient would like to seek help with discontinuing alcohol.     Past medical, surgical, social and family history reviewed in epic     Medication allergies reviewed in epic.     Review of systems:  Constitutional:  No fevers no chills no unintentional weight loss, appetite is stable, complains of some malaise and fatigue over the past 3 days  Eyes:  No visual changes or red eyes  ENT:  No odynophagia or hoarseness of voice  Cardiovascular:  No angina or palpitation, no exertional dyspnea  Respiratory:  No shortness of breath or wheezing  Genitourinary:  No dysuria frequency or hematuria  Musculoskeletal:  Complaining of some arthralgias her hands, no myalgias  Skin:  No pruritus or eczema  Neurologic:  Occasional headaches but no seizures  Gastrointestinal:  See HPI     Physical exam:  Vitals see epic, awake alert oriented x3, appears anxious  Neck:  Supple, no carotid bruits heard  Abdomen:  Soft, mild tenderness to deep palpation in the epigastrium, no guarding or rigidity, nondistended, no masses palpable, no hepatosplenomegaly appreciated, bowel sounds are normal with no abdominal bruits heard  Eyes:  Conjunctivae anicteric, not injected  ENT:  Oral mucosa moist  Cardiovascular:  S1, S2 normal , possible flow murmur in the aortic area, no  gallops.  Tenderness to palpation along bilateral lower rib cage  Respiratory:  Bilateral air entry equal with no rhonchi or crackles  Skin:  No palmar erythema or spider angiomata  Neurologic:  No asterixis or tremors  Psychiatric:  Anxious  Lower extremities:  No pedal edema     Impression:  Recent episode of suspected acute on chronic pancreatitis. Currently improving epigastric pain. Alcohol abuse.     Recommendation:     1. MRI pancreas protocol  2. Abstain from Alcohol- refer to Addiction medicine  3. Dicyclomine 10 mg po bid prn pain

## 2025-06-25 ENCOUNTER — PATIENT MESSAGE (OUTPATIENT)
Dept: GASTROENTEROLOGY | Facility: CLINIC | Age: 55
End: 2025-06-25
Payer: COMMERCIAL

## 2025-06-27 ENCOUNTER — PATIENT MESSAGE (OUTPATIENT)
Dept: PSYCHIATRY | Facility: CLINIC | Age: 55
End: 2025-06-27
Payer: COMMERCIAL

## 2025-07-02 DIAGNOSIS — K21.9 GASTROESOPHAGEAL REFLUX DISEASE WITHOUT ESOPHAGITIS: ICD-10-CM

## 2025-07-02 DIAGNOSIS — E55.9 VITAMIN D DEFICIENCY: ICD-10-CM

## 2025-07-02 DIAGNOSIS — F51.04 PSYCHOPHYSIOLOGICAL INSOMNIA: ICD-10-CM

## 2025-07-02 RX ORDER — DICYCLOMINE HYDROCHLORIDE 10 MG/1
10 CAPSULE ORAL 2 TIMES DAILY PRN
Qty: 180 CAPSULE | Refills: 0 | Status: SHIPPED | OUTPATIENT
Start: 2025-07-02 | End: 2025-09-30

## 2025-07-02 NOTE — TELEPHONE ENCOUNTER
No care due was identified.  Health [facility] Embedded Care Due Messages. Reference number: [id number].   7/02/2025 3:02:17 PM CDT

## 2025-07-03 ENCOUNTER — TELEPHONE (OUTPATIENT)
Dept: GASTROENTEROLOGY | Facility: CLINIC | Age: 55
End: 2025-07-03
Payer: COMMERCIAL

## 2025-07-04 RX ORDER — ZOLPIDEM TARTRATE 5 MG/1
5 TABLET ORAL NIGHTLY PRN
Qty: 20 TABLET | Refills: 1 | OUTPATIENT
Start: 2025-07-04

## 2025-07-04 RX ORDER — ERGOCALCIFEROL 1.25 MG/1
50000 CAPSULE ORAL
Qty: 12 CAPSULE | Refills: 3 | OUTPATIENT
Start: 2025-07-04

## 2025-07-04 RX ORDER — PANTOPRAZOLE SODIUM 20 MG/1
20 TABLET, DELAYED RELEASE ORAL DAILY
Qty: 90 TABLET | Refills: 1 | Status: SHIPPED | OUTPATIENT
Start: 2025-07-04

## 2025-07-07 ENCOUNTER — TELEPHONE (OUTPATIENT)
Dept: PSYCHIATRY | Facility: CLINIC | Age: 55
End: 2025-07-07
Payer: COMMERCIAL

## 2025-07-08 ENCOUNTER — TELEPHONE (OUTPATIENT)
Dept: PSYCHIATRY | Facility: CLINIC | Age: 55
End: 2025-07-08
Payer: COMMERCIAL

## 2025-07-16 ENCOUNTER — HOSPITAL ENCOUNTER (OUTPATIENT)
Dept: RADIOLOGY | Facility: HOSPITAL | Age: 55
Discharge: HOME | End: 2025-07-16
Attending: INTERNAL MEDICINE
Payer: COMMERCIAL

## 2025-07-16 DIAGNOSIS — K86.2 PANCREATIC CYST: ICD-10-CM

## 2025-07-16 PROCEDURE — A9585 GADOBUTROL INJECTION: HCPCS | Performed by: INTERNAL MEDICINE

## 2025-07-16 PROCEDURE — 74183 MRI ABD W/O CNTR FLWD CNTR: CPT | Mod: 26,,, | Performed by: RADIOLOGY

## 2025-07-16 PROCEDURE — 25500020 PHARM REV CODE 255: Performed by: INTERNAL MEDICINE

## 2025-07-16 PROCEDURE — 74183 MRI ABD W/O CNTR FLWD CNTR: CPT | Mod: TC

## 2025-07-16 RX ORDER — GADOBUTROL 604.72 MG/ML
10 INJECTION INTRAVENOUS
Status: COMPLETED | OUTPATIENT
Start: 2025-07-16 | End: 2025-07-16

## 2025-07-16 RX ADMIN — GADOBUTROL 10 ML: 604.72 INJECTION INTRAVENOUS at 02:07

## 2025-07-17 DIAGNOSIS — G89.29 CHRONIC MIDLINE LOW BACK PAIN WITH BILATERAL SCIATICA: ICD-10-CM

## 2025-07-17 DIAGNOSIS — M54.42 CHRONIC MIDLINE LOW BACK PAIN WITH BILATERAL SCIATICA: ICD-10-CM

## 2025-07-17 DIAGNOSIS — F51.04 PSYCHOPHYSIOLOGICAL INSOMNIA: ICD-10-CM

## 2025-07-17 DIAGNOSIS — M54.41 CHRONIC MIDLINE LOW BACK PAIN WITH BILATERAL SCIATICA: ICD-10-CM

## 2025-07-17 DIAGNOSIS — K21.9 GASTROESOPHAGEAL REFLUX DISEASE WITHOUT ESOPHAGITIS: ICD-10-CM

## 2025-07-17 RX ORDER — PANTOPRAZOLE SODIUM 20 MG/1
20 TABLET, DELAYED RELEASE ORAL DAILY
Qty: 90 TABLET | Refills: 1 | OUTPATIENT
Start: 2025-07-17

## 2025-07-17 RX ORDER — GABAPENTIN 300 MG/1
300 CAPSULE ORAL NIGHTLY
Qty: 90 CAPSULE | Refills: 1 | Status: SHIPPED | OUTPATIENT
Start: 2025-07-17

## 2025-07-17 RX ORDER — ZOLPIDEM TARTRATE 5 MG/1
5 TABLET ORAL NIGHTLY PRN
Qty: 20 TABLET | Refills: 1 | Status: SHIPPED | OUTPATIENT
Start: 2025-07-17

## 2025-07-17 NOTE — TELEPHONE ENCOUNTER
Care Due:                  Date            Visit Type   Department     Provider  --------------------------------------------------------------------------------                                EP -                              PRIMARY      [facility]   Melissa Cassy  Last Visit: 01-      CARE ([facility])   CARE           Degranmanda                              EP -                              PRIMARY      [facility]y  Next Visit: 07-      CARE ([facility])   CARE           Degranmanda                                                            Last  Test          Frequency    Reason                     Performed    Due Date  --------------------------------------------------------------------------------    HBA1C.......  6 months...  glimepiride, metFORMIN...  01- 07-    Health [facility] Embedded Care Due Messages. Reference number: [id number].   7/17/2025 9:01:59 AM CDT

## 2025-07-29 ENCOUNTER — LAB VISIT (OUTPATIENT)
Dept: LAB | Facility: HOSPITAL | Age: 55
End: 2025-07-29
Attending: INTERNAL MEDICINE
Payer: COMMERCIAL

## 2025-07-29 ENCOUNTER — OFFICE VISIT (OUTPATIENT)
Dept: PRIMARY CARE CLINIC | Facility: CLINIC | Age: 55
End: 2025-07-29
Payer: COMMERCIAL

## 2025-07-29 ENCOUNTER — TELEPHONE (OUTPATIENT)
Dept: PRIMARY CARE CLINIC | Facility: CLINIC | Age: 55
End: 2025-07-29

## 2025-07-29 VITALS
HEIGHT: 62 IN | SYSTOLIC BLOOD PRESSURE: 128 MMHG | BODY MASS INDEX: 27.26 KG/M2 | RESPIRATION RATE: 20 BRPM | WEIGHT: 148.13 LBS | HEART RATE: 88 BPM | OXYGEN SATURATION: 99 % | TEMPERATURE: 99 F | DIASTOLIC BLOOD PRESSURE: 74 MMHG

## 2025-07-29 DIAGNOSIS — E83.42 HYPOMAGNESEMIA: ICD-10-CM

## 2025-07-29 DIAGNOSIS — E11.69 HYPERLIPIDEMIA ASSOCIATED WITH TYPE 2 DIABETES MELLITUS: ICD-10-CM

## 2025-07-29 DIAGNOSIS — E11.9 TYPE 2 DIABETES MELLITUS WITHOUT COMPLICATION, WITHOUT LONG-TERM CURRENT USE OF INSULIN: Primary | ICD-10-CM

## 2025-07-29 DIAGNOSIS — K76.0 FATTY LIVER: ICD-10-CM

## 2025-07-29 DIAGNOSIS — D53.9 MACROCYTIC ANEMIA: ICD-10-CM

## 2025-07-29 DIAGNOSIS — E78.5 HYPERLIPIDEMIA ASSOCIATED WITH TYPE 2 DIABETES MELLITUS: ICD-10-CM

## 2025-07-29 DIAGNOSIS — E11.9 TYPE 2 DIABETES MELLITUS WITHOUT COMPLICATION, WITHOUT LONG-TERM CURRENT USE OF INSULIN: ICD-10-CM

## 2025-07-29 DIAGNOSIS — I10 PRIMARY HYPERTENSION: ICD-10-CM

## 2025-07-29 DIAGNOSIS — K86.2 PANCREATIC CYST: ICD-10-CM

## 2025-07-29 DIAGNOSIS — Z12.4 CERVICAL CANCER SCREENING: Primary | ICD-10-CM

## 2025-07-29 DIAGNOSIS — K86.1 CHRONIC PANCREATITIS, UNSPECIFIED PANCREATITIS TYPE: ICD-10-CM

## 2025-07-29 LAB
ALBUMIN SERPL BCP-MCNC: 3.8 G/DL (ref 3.5–5.2)
ALP SERPL-CCNC: 282 UNIT/L (ref 40–150)
ALT SERPL W/O P-5'-P-CCNC: 65 UNIT/L (ref 0–55)
ANION GAP (OHS): 12 MMOL/L (ref 8–16)
AST SERPL-CCNC: 177 UNIT/L (ref 0–50)
BILIRUB SERPL-MCNC: 0.8 MG/DL (ref 0.1–1)
BUN SERPL-MCNC: 5 MG/DL (ref 6–20)
CALCIUM SERPL-MCNC: 9.5 MG/DL (ref 8.7–10.5)
CHLORIDE SERPL-SCNC: 103 MMOL/L (ref 95–110)
CO2 SERPL-SCNC: 26 MMOL/L (ref 23–29)
CREAT SERPL-MCNC: 0.7 MG/DL (ref 0.5–1.4)
EAG (OHS): 114 MG/DL (ref 68–131)
ERYTHROCYTE [DISTWIDTH] IN BLOOD BY AUTOMATED COUNT: 12.4 % (ref 11.5–14.5)
FOLATE SERPL-MCNC: 5.9 NG/ML (ref 4–24)
GFR SERPLBLD CREATININE-BSD FMLA CKD-EPI: >60 ML/MIN/1.73/M2
GLUCOSE SERPL-MCNC: 110 MG/DL (ref 70–110)
HBA1C MFR BLD: 5.6 % (ref 4–5.6)
HCT VFR BLD AUTO: 37.3 % (ref 37–48.5)
HGB BLD-MCNC: 12.4 GM/DL (ref 12–16)
IRON SATN MFR SERPL: 57 % (ref 20–50)
IRON SERPL-MCNC: 197 UG/DL (ref 30–160)
MAGNESIUM SERPL-MCNC: 1.5 MG/DL (ref 1.6–2.6)
MCH RBC QN AUTO: 34.5 PG (ref 27–31)
MCHC RBC AUTO-ENTMCNC: 33.2 G/DL (ref 32–36)
MCV RBC AUTO: 104 FL (ref 82–98)
PLATELET # BLD AUTO: 250 K/UL (ref 150–450)
PMV BLD AUTO: 10.9 FL (ref 9.2–12.9)
POTASSIUM SERPL-SCNC: 3.6 MMOL/L (ref 3.5–5.1)
PROT SERPL-MCNC: 7.4 GM/DL (ref 6–8.4)
RBC # BLD AUTO: 3.59 M/UL (ref 4–5.4)
SODIUM SERPL-SCNC: 141 MMOL/L (ref 136–145)
TIBC SERPL-MCNC: 348 UG/DL (ref 250–450)
TRANSFERRIN SERPL-MCNC: 235 MG/DL (ref 200–375)
VIT B12 SERPL-MCNC: 504 PG/ML (ref 210–950)
WBC # BLD AUTO: 3.66 K/UL (ref 3.9–12.7)

## 2025-07-29 PROCEDURE — 3074F SYST BP LT 130 MM HG: CPT | Mod: CPTII,S$GLB,, | Performed by: INTERNAL MEDICINE

## 2025-07-29 PROCEDURE — 85027 COMPLETE CBC AUTOMATED: CPT

## 2025-07-29 PROCEDURE — 36415 COLL VENOUS BLD VENIPUNCTURE: CPT | Mod: PN

## 2025-07-29 PROCEDURE — 82746 ASSAY OF FOLIC ACID SERUM: CPT

## 2025-07-29 PROCEDURE — 1159F MED LIST DOCD IN RCRD: CPT | Mod: CPTII,S$GLB,, | Performed by: INTERNAL MEDICINE

## 2025-07-29 PROCEDURE — 99214 OFFICE O/P EST MOD 30 MIN: CPT | Mod: S$GLB,,, | Performed by: INTERNAL MEDICINE

## 2025-07-29 PROCEDURE — 3044F HG A1C LEVEL LT 7.0%: CPT | Mod: CPTII,S$GLB,, | Performed by: INTERNAL MEDICINE

## 2025-07-29 PROCEDURE — 83540 ASSAY OF IRON: CPT

## 2025-07-29 PROCEDURE — 3066F NEPHROPATHY DOC TX: CPT | Mod: CPTII,S$GLB,, | Performed by: INTERNAL MEDICINE

## 2025-07-29 PROCEDURE — 99999 PR PBB SHADOW E&M-EST. PATIENT-LVL V: CPT | Mod: PBBFAC,,, | Performed by: INTERNAL MEDICINE

## 2025-07-29 PROCEDURE — 83735 ASSAY OF MAGNESIUM: CPT

## 2025-07-29 PROCEDURE — 82607 VITAMIN B-12: CPT

## 2025-07-29 PROCEDURE — G2211 COMPLEX E/M VISIT ADD ON: HCPCS | Mod: S$GLB,,, | Performed by: INTERNAL MEDICINE

## 2025-07-29 PROCEDURE — 80053 COMPREHEN METABOLIC PANEL: CPT

## 2025-07-29 PROCEDURE — 3078F DIAST BP <80 MM HG: CPT | Mod: CPTII,S$GLB,, | Performed by: INTERNAL MEDICINE

## 2025-07-29 PROCEDURE — 3072F LOW RISK FOR RETINOPATHY: CPT | Mod: CPTII,S$GLB,, | Performed by: INTERNAL MEDICINE

## 2025-07-29 PROCEDURE — 83036 HEMOGLOBIN GLYCOSYLATED A1C: CPT

## 2025-07-29 PROCEDURE — 4010F ACE/ARB THERAPY RXD/TAKEN: CPT | Mod: CPTII,S$GLB,, | Performed by: INTERNAL MEDICINE

## 2025-07-29 PROCEDURE — 1160F RVW MEDS BY RX/DR IN RCRD: CPT | Mod: CPTII,S$GLB,, | Performed by: INTERNAL MEDICINE

## 2025-07-29 PROCEDURE — 3061F NEG MICROALBUMINURIA REV: CPT | Mod: CPTII,S$GLB,, | Performed by: INTERNAL MEDICINE

## 2025-07-29 PROCEDURE — 3008F BODY MASS INDEX DOCD: CPT | Mod: CPTII,S$GLB,, | Performed by: INTERNAL MEDICINE
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
## 2025-07-29 NOTE — TELEPHONE ENCOUNTER
----- Message from Sarah sent at 7/29/2025 10:14 AM CDT -----  Pt is wanting a referral to see Gynecology pt has not saw the dept since 2020 appt is scheduled for next month once Dr. Neely put in the referral please linked to appt scheduled for 8/14/2025.    Thanks

## 2025-07-30 ENCOUNTER — PATIENT MESSAGE (OUTPATIENT)
Dept: GASTROENTEROLOGY | Facility: CLINIC | Age: 55
End: 2025-07-30
Payer: COMMERCIAL

## 2025-08-01 ENCOUNTER — TELEPHONE (OUTPATIENT)
Dept: GASTROENTEROLOGY | Facility: CLINIC | Age: 55
End: 2025-08-01
Payer: COMMERCIAL

## 2025-08-01 NOTE — TELEPHONE ENCOUNTER
----- Message from Celestino Walker MD sent at 7/30/2025  2:46 PM CDT -----  Please notify patient, based on the MRI will schedule a visit with Pancreas specialist.    Please schedule a visit with [facility] ( Dr.Neej Ding) for pancreas cyst.  ----- Message -----  From: Interface, Rad Results In  Sent: 7/16/2025   4:58 PM CDT  To: Celestino Walker MD

## 2025-08-04 PROBLEM — R10.9 ABDOMINAL PAIN: Status: RESOLVED | Noted: 2018-07-25 | Resolved: 2025-08-04

## 2025-08-06 ENCOUNTER — OFFICE VISIT (OUTPATIENT)
Dept: GASTROENTEROLOGY | Facility: CLINIC | Age: 55
End: 2025-08-06
Payer: COMMERCIAL

## 2025-08-06 VITALS
BODY MASS INDEX: 26.41 KG/M2 | WEIGHT: 149.06 LBS | SYSTOLIC BLOOD PRESSURE: 129 MMHG | DIASTOLIC BLOOD PRESSURE: 77 MMHG | HEIGHT: 63 IN | HEART RATE: 70 BPM

## 2025-08-06 DIAGNOSIS — K86.2 PANCREATIC CYST: ICD-10-CM

## 2025-08-06 DIAGNOSIS — R10.13 EPIGASTRIC ABDOMINAL PAIN: ICD-10-CM

## 2025-08-06 DIAGNOSIS — R63.4 UNINTENTIONAL WEIGHT LOSS: ICD-10-CM

## 2025-08-06 DIAGNOSIS — K86.0 ALCOHOL-INDUCED CHRONIC PANCREATITIS: Primary | ICD-10-CM

## 2025-08-06 DIAGNOSIS — R79.89 ELEVATED LFTS: ICD-10-CM

## 2025-08-06 PROCEDURE — 99214 OFFICE O/P EST MOD 30 MIN: CPT | Mod: S$GLB,,,

## 2025-08-06 PROCEDURE — 1159F MED LIST DOCD IN RCRD: CPT | Mod: CPTII,S$GLB,,

## 2025-08-06 PROCEDURE — 3072F LOW RISK FOR RETINOPATHY: CPT | Mod: CPTII,S$GLB,,

## 2025-08-06 PROCEDURE — 99999 PR PBB SHADOW E&M-EST. PATIENT-LVL IV: CPT | Mod: PBBFAC,,,

## 2025-08-06 PROCEDURE — 3074F SYST BP LT 130 MM HG: CPT | Mod: CPTII,S$GLB,,

## 2025-08-06 PROCEDURE — 4010F ACE/ARB THERAPY RXD/TAKEN: CPT | Mod: CPTII,S$GLB,,

## 2025-08-06 PROCEDURE — 3044F HG A1C LEVEL LT 7.0%: CPT | Mod: CPTII,S$GLB,,

## 2025-08-06 PROCEDURE — 3066F NEPHROPATHY DOC TX: CPT | Mod: CPTII,S$GLB,,

## 2025-08-06 PROCEDURE — 3008F BODY MASS INDEX DOCD: CPT | Mod: CPTII,S$GLB,,

## 2025-08-06 PROCEDURE — 3061F NEG MICROALBUMINURIA REV: CPT | Mod: CPTII,S$GLB,,

## 2025-08-06 PROCEDURE — 3078F DIAST BP <80 MM HG: CPT | Mod: CPTII,S$GLB,,

## 2025-08-06 NOTE — PROGRESS NOTES
Gastroenterology: [facility]      SUBJECTIVE:         Chief Complaint: Here for evaluation of a pancreatic cyst     History of Present Illness:  Patient is a 54 y.o. female with history of recurrent acute on chronic pancreatitis presents with a pancreatic cyst. She had an episode of pancreatitis in 2017 with CT showing a large amount of edema and fibrofatty streaking of the mesentery around the pancreatic head and neck. Subsequent CT in 2019 demonstrated a approximately 2.8 x 2.1 by 2.5 cm low-attenuation mass measuring approximately 55.7 Hounsfield units in the distal body/tail of pancreas. She then underwent an EUS in June 2019 which showed Pancreatic parenchymal abnormalities consisting of hyperechoic foci, hypoechoic foci and lobularity were noted in the pancreatic tail suspected to be sequeale of acute pancreatitis. Repeat EUS in June 2019 showed the same pancreatic inflammatory process that was decreased in size somewhat. MRCP in September 2021 showed mild prominence of central and extrahepatic biliary ducts and CBD measuring 8 mm. Slight abrupt tapering within the distal CBD just proximal to the the pancreatic head. She then underwent repeat EUS in October 2021 which showed a round mass in the pancreatic tail measuring 18 mm. FNB was performed and path returned benign showing fat necrosis, debris, pigment and blood.     More recently she was seen in the ED in April for abdominal pain. Lipase was normal at the time but CT showed evidence of acute on chronic pancreatitis with mild haziness along the pancreatic head and multiple calcifications along the pancreas. There was also a newly developed hypodensity in the pancreatic head measuring 2.1 cm. Splenic vein is likely thrombosed with several collateral vessels in the LUQ. Most recent imaging with MRCP 7/16/25 which demonstrated pancreatic atrophy, 2.2 x 2.4 x 2.4 cm T2 hyperintense cystic lesion adjacent to pancreatic head with 3 mm nodular  component likely corresponding to calcification on prior CT. No internal septations. No definite connection to the main pancreatic duct. The lesion is mildly T1 hyperintense with peripheral enhancement and no diffusion restriction. There is ectatic appearance of the pancreatic duct with generalized atrophy. Stable prominent common bile duct measuring 10 mm. No intrahepatic biliary ductal dilatation    She denies frequent alcohol use or history of alcohol abuse however her PETH was significantly elevated in aug 2024 (1044) and she does admit to drinking more recently because of multiple family members passing away recently. She had a prior cholecystectomy in 2019 because this was thought to possibly be the cause of her pancreatitis. She does not smoke. Triglycerides normal. Denies family history of pancreatitis or pancreatic cancer. She does have a hx of diabetes- well controlled.    In regards to current symptoms she endorses increasing epigastric abdominal pain, fatigue, and weight loss over the last two months. She does not have any diarrhea but does endorse increased frequency of bowel movements with steatorrhea. Denies vomiting, jaundice, fever. She does have elevated liver enzymes at baseline with most recent 7/29 showing Tbili wnl, , , and ALT 65. Tbili has always been normal. She previously saw hepatology in August 2024 and was supposed to have further workup but she was lost to follow up        Personal/family factors:    There is not a family history of pancreatic cancer     The patient does not smoke.    ECOG status 1 - Symptomatic but completely ambulatory        Review of Systems   Constitutional: no fever, chills or change in weight   Eyes: no visual changes   ENT: no sore throat or dysphagia  Respiratory: no cough or shortness of breath   Cardiovascular: no chest pain or palpitations   Gastrointestinal: as per HPI  Hematologic/Lymphatic: no easy bruising or lymphadenopathy    Musculoskeletal: no arthralgias or myalgias   Neurological: no seizures, tremors or change in mental status  Behavioral/Psych: no auditory or visual hallucinations    Past Medical History:   Diagnosis Date    GERD (gastroesophageal reflux disease)     Hyperlipidemia associated with type 2 diabetes mellitus     Hypertension     Mental disorder     Pancreatitis     Type 2 diabetes mellitus     Vitamin D deficiency        Past Surgical History:   Procedure Laterality Date     SECTION      COLONOSCOPY N/A 3/18/2021    Procedure: COLONOSCOPY;  Surgeon: Celestino Walker MD;  Location: [facility] ENDO (4TH FLR);  Service: Endoscopy;  Laterality: N/A;  covid screening PCW 3/15 - [facility]    ENDOMETRIAL ABLATION      ENDOSCOPIC ULTRASOUND OF UPPER GASTROINTESTINAL TRACT N/A 2019    Procedure: ULTRASOUND, UPPER GI TRACT, ENDOSCOPIC;  Surgeon: Kristian Wakefield MD;  Location: [facility] ENDO (2ND FLR);  Service: Endoscopy;  Laterality: N/A;  To be done end of  along with EGD evaluation to r/o PUD.    ENDOSCOPIC ULTRASOUND OF UPPER GASTROINTESTINAL TRACT N/A 2019    Procedure: ULTRASOUND, UPPER GI TRACT, ENDOSCOPIC;  Surgeon: Po Noonan MD;  Location: [facility] ENDO (2ND FLR);  Service: Endoscopy;  Laterality: N/A;  CT still shows a spot in the pancreas. It has not changed, and likely just represents an area of inflammation, but I think we should take another look with EUS.  Dr SHERLEY Wakefield  PM prep - pg  10/31/19 appt confirmed-rb    ENDOSCOPIC ULTRASOUND OF UPPER GASTROINTESTINAL TRACT N/A 10/15/2021    Procedure: ULTRASOUND, UPPER GI TRACT, ENDOSCOPIC;  Surgeon: Rick Mooney MD;  Location: [facility] ENDO (2ND FLR);  Service: Endoscopy;  Laterality: N/A;  Covid-19 test 10/12 [facility]-instructions sent via portal 10/5-MG    ESOPHAGOGASTRODUODENOSCOPY N/A 2019    Procedure: EGD (ESOPHAGOGASTRODUODENOSCOPY);  Surgeon: Kristian Wakefield MD;  Location: [facility] END[facility] (2ND FLR);  Service: Endoscopy;  Laterality: N/A;    LAPAROSCOPIC  CHOLECYSTECTOMY N/A 7/24/2019    Procedure: CHOLECYSTECTOMY, LAPAROSCOPIC;  Surgeon: Huber Neri MD;  Location: [facility] OR [address] [place] [address];  Service: General;  Laterality: N/A;    TUBAL LIGATION  04/1994    upper gi         Family History   Problem Relation Name Age of Onset    Heart failure Mother      Stroke Mother      Aneurysm Mother      Alcohol abuse Mother      Hypertension Father      Alcohol abuse Father      Liver disease Father      Diabetes Maternal Aunt      Stomach cancer Maternal Aunt      Diabetes Maternal Uncle      Heart disease Maternal Uncle      Lung cancer Maternal Uncle      Coronary artery disease Paternal Grandmother      Dementia Paternal Grandmother      Dementia Paternal Grandfather      Colon cancer Neg Hx      Esophageal cancer Neg Hx      Ovarian cancer Neg Hx      Breast cancer Neg Hx      Amblyopia Neg Hx      Blindness Neg Hx      Cancer Neg Hx      Cataracts Neg Hx      Glaucoma Neg Hx      Macular degeneration Neg Hx      Retinal detachment Neg Hx      Strabismus Neg Hx      Thyroid disease Neg Hx         Social History[1]    Medications Ordered Prior to Encounter[2]    Review of patient's allergies indicates:   Allergen Reactions    Pineapple Swelling    Hydrochlorothiazide Other (See Comments)     CAUSING PANCREATITIS        Physical Exam:  General: Well-developed, well-appearing, no acute distress  Neuro: alert and oriented to person, place, time; normal appearing gait  Eyes: No scleral icterus, pupils equally round, normal-appearing conjunctiva  Neck: supple; no cervical lymphadenopathy  CVS: regular rate and rhythm; no murmurs  Lungs: clear to auscultation bilaterally; no labored breathing, no wheezes  Abdomen: soft, non-distended, non-tender, no rebound tenderness or guarding, nomal bowel sounds  Extremities: no cyanosis, edema, or clubbing  Skin: no rash, no jaundice        Laboratory:   Lab Results   Component Value Date    ALT 65 (H) 07/29/2025     (H) 07/29/2025    GGT  724 (H) 03/08/2024    ALKPHOS 282 (H) 07/29/2025    BILITOT 0.8 07/29/2025     Lab Results   Component Value Date    WBC 3.66 (L) 07/29/2025    HGB 12.4 07/29/2025    HCT 37.3 07/29/2025     (H) 07/29/2025     07/29/2025     Lab Results   Component Value Date    INR 0.9 11/07/2022           Diagnostic/Imaging Results:  As above    ASSESSMENT/PLAN:     Patient with history of acute on chronic pancreatitis presents with 2 month history of worsening abdominal pain, nausea, weight loss. She has had multiple previous episodes of acute on chronic pancreatitis. Initially thought to be gallstone related so she underwent cholecystectomy in 2019. She denies heavy alcohol use but her PETH has previously shown likely heavy alcohol use and upon further questioning she does endorse heavier drinking lately due to deaths in her family. She will be meeting with addiction psych soon to help decrease her alcohol use. Most recent EUS in October 2021 which showed a round mass in the pancreatic tail measuring 18 mm. FNB was performed and path returned benign showing fat necrosis, debris, pigment and blood. Most recent imaging with MRI MRCP 7/16/25 which demonstrated pancreatic atrophy, 2.2 x 2.4 x 2.4 cm T2 hyperintense cystic lesion adjacent to pancreatic head with 3 mm nodular component likely corresponding to calcification on prior CT. No internal septations. No definite connection to the main pancreatic duct. The lesion is mildly T1 hyperintense with peripheral enhancement and no diffusion restriction. There is ectatic appearance of the pancreatic duct with generalized atrophy. Stable prominent common bile duct measuring 10 mm. No intrahepatic biliary ductal dilatation. Patient has baseline elevated LFTs which she previously saw hepatology in August 2024. She was supposed to have further workup which never occured    1. Alcohol-induced chronic pancreatitis    2. Pancreatic cyst    3. Epigastric abdominal pain    4.  Unintentional weight loss        Plan:     Orders Placed This Encounter    Pancreatic elastase, fecal     Chronic pancreatitis  Recommend EUS for further evaluation of pancreatic cyst. Potentially a pseudocyst given hx of acute on chronic pancreatitis however with her recently worsening symptoms, would recommend EUS to more definitively r/o malignancy  Ordered pancreatic elastase given hx of chronic pancreatitis and complaint of steatorrhea. If low will try her on creon  Explained the EUS procedure in detail and answered the patients questions regarding this procedure  Went over the risks of the procedure including but not limited to risks of anesthesia, bleeding, perforation, infection, pancreatitis- she verbalized understanding of these risks and would like to proceed with EUS  We discussed the importance of complete lifelong alcohol cessation to prevent future episodes of acute pancreatitis/worsening of chronic pancreatitis   She should follow up with her PCP for ongoing surveillance and management of her diabetes.  Placed referral to hepatology for elevated liver enzymes  Follow up clinic visit in 3 months            Conor Miranda PA-C  Department of Advanced Endoscopy  [facility]          [1]   Social History  Socioeconomic History    Marital status:    Occupational History    Occupation: manager   Tobacco Use    Smoking status: Former     Current packs/day: 0.00     Types: Cigarettes     Quit date: 2019     Years since quittin.5    Smokeless tobacco: Never    Tobacco comments:     Quit 2019   Substance and Sexual Activity    Alcohol use: Yes     Alcohol/week: 2.0 standard drinks of alcohol     Types: 2 Glasses of wine per week     Comment: social     Drug use: No    Sexual activity: Yes     Partners: Male     Birth control/protection: See Surgical Hx     Comment: tubal ligation in    Social History Narrative    , 3 adult children.  of an apartment  complex.      Social Drivers of Health     Financial Resource Strain: Medium Risk (2/29/2024)    Overall Financial Resource Strain (CARDIA)     Difficulty of Paying Living Expenses: Somewhat hard   Food Insecurity: Food Insecurity Present (2/29/2024)    Hunger Vital Sign     Worried About Running Out of Food in the Last Year: Sometimes true     Ran Out of Food in the Last Year: Sometimes true   Transportation Needs: No Transportation Needs (2/29/2024)    PRAPARE - Transportation     Lack of Transportation (Medical): No     Lack of Transportation (Non-Medical): No   Physical Activity: Unknown (2/29/2024)    Exercise Vital Sign     Days of Exercise per Week: 2 days   Recent Concern: Physical Activity - Insufficiently Active (2/29/2024)    Exercise Vital Sign     Days of Exercise per Week: 2 days     Minutes of Exercise per Session: 20 min   Stress: Stress Concern Present (2/29/2024)    [employer] - Occupational Stress Questionnaire     Feeling of Stress : Rather much   Housing Stability: Low Risk  (2/29/2024)    Housing Stability Vital Sign     Unable to Pay for Housing in the Last Year: No     Number of Places Lived in the Last Year: 1     Unstable Housing in the Last Year: No   [2]   Current Outpatient Medications on File Prior to Visit   Medication Sig Dispense Refill    amLODIPine (NORVASC) 10 MG tablet Take 1 tablet (10 mg total) by mouth once daily. 90 tablet 1    atorvastatin (LIPITOR) 20 MG tablet Take 1 tablet (20 mg total) by mouth once daily. 90 tablet 3    dicyclomine (BENTYL) 10 MG capsule Take 1 capsule (10 mg total) by mouth 2 (two) times daily as needed (pain). 180 capsule 0    ergocalciferol (ERGOCALCIFEROL) 50,000 unit Cap Take 1 capsule (50,000 Units total) by mouth every 7 days. 12 capsule 3    fluocinonide 0.05% (LIDEX) 0.05 % cream Apply topically 2 (two) times daily. Prn itch. 60 g 0    gabapentin (NEURONTIN) 300 MG capsule Take 1 capsule (300 mg total) by mouth  every evening. For nerve pain. 90 capsule 1    glimepiride (AMARYL) 2 MG tablet Take 1 tablet (2 mg total) by mouth before breakfast. 90 tablet 1    magnesium oxide (MAG-OX) 400 mg (241.3 mg magnesium) tablet Take 400 mg by mouth once daily.      magnesium oxide 400 mg magnesium Tab Take 400 mg by mouth once daily. 90 tablet 1    metFORMIN (GLUCOPHAGE) 500 MG tablet Take 1 tablet (500 mg total) by mouth daily with breakfast. 90 tablet 1    ondansetron (ZOFRAN-ODT) 4 MG TbDL Take 1 tablet (4 mg total) by mouth every 8 (eight) hours as needed (nausea). 20 tablet 0    pantoprazole (PROTONIX) 20 MG tablet Take 1 tablet (20 mg total) by mouth once daily. 90 tablet 1    zolpidem (AMBIEN) 5 MG Tab Take 1 tablet (5 mg total) by mouth nightly as needed (Insomnia.). 20 tablet 1    losartan (COZAAR) 25 MG tablet Take 1 tablet (25 mg total) by mouth once daily. For Blood Pressure. (Patient not taking: Reported on 8/6/2025) 90 tablet 1     No current facility-administered medications on file prior to visit.

## 2025-08-08 ENCOUNTER — TELEPHONE (OUTPATIENT)
Dept: GASTROENTEROLOGY | Facility: CLINIC | Age: 55
End: 2025-08-08
Payer: COMMERCIAL

## 2025-08-11 ENCOUNTER — TELEPHONE (OUTPATIENT)
Dept: GASTROENTEROLOGY | Facility: CLINIC | Age: 55
End: 2025-08-11
Payer: COMMERCIAL

## 2025-08-11 DIAGNOSIS — K86.2 PANCREATIC CYST: Primary | ICD-10-CM

## 2025-08-12 DIAGNOSIS — K76.0 NAFLD (NONALCOHOLIC FATTY LIVER DISEASE): Primary | ICD-10-CM

## 2025-08-14 ENCOUNTER — OFFICE VISIT (OUTPATIENT)
Dept: OBSTETRICS AND GYNECOLOGY | Facility: CLINIC | Age: 55
End: 2025-08-14
Payer: COMMERCIAL

## 2025-08-14 ENCOUNTER — LAB VISIT (OUTPATIENT)
Dept: LAB | Facility: HOSPITAL | Age: 55
End: 2025-08-14
Payer: COMMERCIAL

## 2025-08-14 VITALS
DIASTOLIC BLOOD PRESSURE: 73 MMHG | HEIGHT: 63 IN | SYSTOLIC BLOOD PRESSURE: 139 MMHG | BODY MASS INDEX: 26.41 KG/M2 | WEIGHT: 149.06 LBS

## 2025-08-14 DIAGNOSIS — Z01.419 WELL WOMAN EXAM WITH ROUTINE GYNECOLOGICAL EXAM: Primary | ICD-10-CM

## 2025-08-14 DIAGNOSIS — K86.0 ALCOHOL-INDUCED CHRONIC PANCREATITIS: ICD-10-CM

## 2025-08-14 DIAGNOSIS — Z12.4 CERVICAL CANCER SCREENING: ICD-10-CM

## 2025-08-14 PROCEDURE — 87624 HPV HI-RISK TYP POOLED RSLT: CPT

## 2025-08-14 PROCEDURE — 99999 PR PBB SHADOW E&M-EST. PATIENT-LVL IV: CPT | Mod: PBBFAC,,,

## 2025-08-14 PROCEDURE — 82653 EL-1 FECAL QUANTITATIVE: CPT

## 2025-08-15 LAB
ELASTASE, STOOL INTERPRETATION (OHS): ABNORMAL
PANCREATIC ELASTASE, FECAL (OHS): 36.1 ΜG/G

## 2025-08-16 DIAGNOSIS — E11.9 TYPE 2 DIABETES MELLITUS WITHOUT COMPLICATION, WITHOUT LONG-TERM CURRENT USE OF INSULIN: ICD-10-CM

## 2025-08-16 DIAGNOSIS — I10 PRIMARY HYPERTENSION: ICD-10-CM

## 2025-08-16 RX ORDER — GLIMEPIRIDE 2 MG/1
2 TABLET ORAL
Qty: 90 TABLET | Refills: 1 | Status: SHIPPED | OUTPATIENT
Start: 2025-08-16

## 2025-08-16 RX ORDER — AMLODIPINE BESYLATE 10 MG/1
10 TABLET ORAL
Qty: 90 TABLET | Refills: 3 | Status: SHIPPED | OUTPATIENT
Start: 2025-08-16

## 2025-08-18 ENCOUNTER — TELEPHONE (OUTPATIENT)
Dept: ENDOSCOPY | Facility: HOSPITAL | Age: 55
End: 2025-08-18
Payer: COMMERCIAL

## 2025-08-19 ENCOUNTER — ANESTHESIA EVENT (OUTPATIENT)
Dept: ENDOSCOPY | Facility: HOSPITAL | Age: 55
End: 2025-08-19
Payer: COMMERCIAL

## 2025-08-20 ENCOUNTER — ANESTHESIA (OUTPATIENT)
Dept: ENDOSCOPY | Facility: HOSPITAL | Age: 55
End: 2025-08-20
Payer: COMMERCIAL

## 2025-08-20 ENCOUNTER — HOSPITAL ENCOUNTER (OUTPATIENT)
Facility: HOSPITAL | Age: 55
Discharge: HOME | End: 2025-08-20
Attending: INTERNAL MEDICINE | Admitting: INTERNAL MEDICINE
Payer: COMMERCIAL

## 2025-08-20 VITALS
OXYGEN SATURATION: 97 % | HEIGHT: 64 IN | WEIGHT: 147 LBS | RESPIRATION RATE: 10 BRPM | SYSTOLIC BLOOD PRESSURE: 135 MMHG | TEMPERATURE: 98 F | HEART RATE: 76 BPM | BODY MASS INDEX: 25.1 KG/M2 | DIASTOLIC BLOOD PRESSURE: 71 MMHG

## 2025-08-20 DIAGNOSIS — K86.2 PANCREATIC CYST: ICD-10-CM

## 2025-08-20 DIAGNOSIS — K86.0 ALCOHOL-INDUCED CHRONIC PANCREATITIS: Primary | ICD-10-CM

## 2025-08-20 LAB — POCT GLUCOSE: 141 MG/DL (ref 70–110)

## 2025-08-20 PROCEDURE — 43259 EGD US EXAM DUODENUM/JEJUNUM: CPT | Performed by: INTERNAL MEDICINE

## 2025-08-20 PROCEDURE — 37000009 HC ANESTHESIA EA ADD 15 MINS: Performed by: INTERNAL MEDICINE

## 2025-08-20 PROCEDURE — 25000003 PHARM REV CODE 250: Performed by: NURSE ANESTHETIST, CERTIFIED REGISTERED

## 2025-08-20 PROCEDURE — 82962 GLUCOSE BLOOD TEST: CPT | Performed by: INTERNAL MEDICINE

## 2025-08-20 PROCEDURE — 63600175 PHARM REV CODE 636 W HCPCS: Performed by: NURSE ANESTHETIST, CERTIFIED REGISTERED

## 2025-08-20 PROCEDURE — 43259 EGD US EXAM DUODENUM/JEJUNUM: CPT | Mod: ,,, | Performed by: INTERNAL MEDICINE

## 2025-08-20 PROCEDURE — 37000008 HC ANESTHESIA 1ST 15 MINUTES: Performed by: INTERNAL MEDICINE

## 2025-08-20 RX ORDER — LIDOCAINE HYDROCHLORIDE 20 MG/ML
INJECTION INTRAVENOUS
Status: DISCONTINUED | OUTPATIENT
Start: 2025-08-20 | End: 2025-08-20

## 2025-08-20 RX ORDER — ONDANSETRON HYDROCHLORIDE 2 MG/ML
INJECTION, SOLUTION INTRAVENOUS
Status: DISCONTINUED | OUTPATIENT
Start: 2025-08-20 | End: 2025-08-20

## 2025-08-20 RX ORDER — PROPOFOL 10 MG/ML
VIAL (ML) INTRAVENOUS CONTINUOUS PRN
Status: DISCONTINUED | OUTPATIENT
Start: 2025-08-20 | End: 2025-08-20

## 2025-08-20 RX ORDER — SODIUM CHLORIDE 0.9 % (FLUSH) 0.9 %
10 SYRINGE (ML) INJECTION
Status: DISCONTINUED | OUTPATIENT
Start: 2025-08-20 | End: 2025-08-20 | Stop reason: HOSPADM

## 2025-08-20 RX ORDER — TOPICAL ANESTHETIC 200 MG/ML
SPRAY DENTAL; PERIODONTAL
Status: DISCONTINUED | OUTPATIENT
Start: 2025-08-20 | End: 2025-08-20

## 2025-08-20 RX ORDER — PROPOFOL 10 MG/ML
VIAL (ML) INTRAVENOUS
Status: DISCONTINUED | OUTPATIENT
Start: 2025-08-20 | End: 2025-08-20

## 2025-08-20 RX ADMIN — PROPOFOL 30 MG: 10 INJECTION, EMULSION INTRAVENOUS at 09:08

## 2025-08-20 RX ADMIN — ONDANSETRON 4 MG: 2 INJECTION, SOLUTION INTRAMUSCULAR; INTRAVENOUS at 10:08

## 2025-08-20 RX ADMIN — SODIUM CHLORIDE: 0.9 INJECTION, SOLUTION INTRAVENOUS at 09:08

## 2025-08-20 RX ADMIN — PROPOFOL 70 MG: 10 INJECTION, EMULSION INTRAVENOUS at 09:08

## 2025-08-20 RX ADMIN — PROPOFOL 150 MCG/KG/MIN: 10 INJECTION, EMULSION INTRAVENOUS at 09:08

## 2025-08-20 RX ADMIN — TOPICAL ANESTHETIC 1 EACH: 200 SPRAY DENTAL; PERIODONTAL at 09:08

## 2025-08-20 RX ADMIN — LIDOCAINE HYDROCHLORIDE 100 MG: 20 INJECTION, SOLUTION INTRAVENOUS at 09:08

## 2025-08-28 ENCOUNTER — HOSPITAL ENCOUNTER (OUTPATIENT)
Dept: RADIOLOGY | Facility: CLINIC | Age: 55
Discharge: HOME | End: 2025-08-28
Attending: INTERNAL MEDICINE

## 2025-08-28 DIAGNOSIS — Z13.820 OSTEOPOROSIS SCREENING: ICD-10-CM

## 2025-08-28 PROCEDURE — 77080 DXA BONE DENSITY AXIAL: CPT | Mod: TC

## (undated) DEVICE — TROCAR ENDOPATH XCEL 5MM 7.5CM

## (undated) DEVICE — WARMER DRAPE STERILE LF

## (undated) DEVICE — SCISSOR 5MMX35CM DIRECT DRIVE

## (undated) DEVICE — KIT ANTIFOG

## (undated) DEVICE — BLADE SURG CARBON STEEL SZ11

## (undated) DEVICE — DRAPE STERI INSTRUMENT 1018

## (undated) DEVICE — TRAY MINOR GEN SURG

## (undated) DEVICE — SEE MEDLINE ITEM 152622

## (undated) DEVICE — CLIP HEMO-LOK ML

## (undated) DEVICE — NDL HYPO REG 25G X 1 1/2

## (undated) DEVICE — SOL NS 1000CC

## (undated) DEVICE — TROCAR ENDOPATH XCEL 5X75MM

## (undated) DEVICE — ADHESIVE DERMABOND ADVANCED

## (undated) DEVICE — NDL 18GA X1 1/2 REG BEVEL

## (undated) DEVICE — IRRIGATOR ENDOSCOPY DISP.

## (undated) DEVICE — TUBING HF INSUFFLATION W/ FLTR

## (undated) DEVICE — BAG TISS RETRV MONARCH 10MM

## (undated) DEVICE — SUT MCRYL PLUS 4-0 PS2 27IN

## (undated) DEVICE — DRAPE ABDOMINAL TIBURON 14X11

## (undated) DEVICE — ELECTRODE REM PLYHSV RETURN 9